# Patient Record
Sex: FEMALE | Race: WHITE | NOT HISPANIC OR LATINO | Employment: OTHER | URBAN - METROPOLITAN AREA
[De-identification: names, ages, dates, MRNs, and addresses within clinical notes are randomized per-mention and may not be internally consistent; named-entity substitution may affect disease eponyms.]

---

## 2017-01-23 DIAGNOSIS — I10 ESSENTIAL HYPERTENSION: Chronic | ICD-10-CM

## 2017-01-23 RX ORDER — AMLODIPINE BESYLATE 10 MG/1
TABLET ORAL
Qty: 90 TABLET | Refills: 0 | Status: CANCELLED | OUTPATIENT
Start: 2017-01-23

## 2017-01-31 ENCOUNTER — LAB VISIT (OUTPATIENT)
Dept: LAB | Facility: HOSPITAL | Age: 72
End: 2017-01-31
Attending: INTERNAL MEDICINE
Payer: MEDICARE

## 2017-01-31 ENCOUNTER — CLINICAL SUPPORT (OUTPATIENT)
Dept: FAMILY MEDICINE | Facility: CLINIC | Age: 72
End: 2017-01-31
Payer: MEDICARE

## 2017-01-31 DIAGNOSIS — E78.2 MIXED HYPERLIPIDEMIA: ICD-10-CM

## 2017-01-31 DIAGNOSIS — E78.5 HYPERLIPIDEMIA, UNSPECIFIED HYPERLIPIDEMIA TYPE: Primary | ICD-10-CM

## 2017-01-31 DIAGNOSIS — M54.12 CERVICAL RADICULOPATHY: ICD-10-CM

## 2017-01-31 DIAGNOSIS — I10 ESSENTIAL HYPERTENSION: Chronic | ICD-10-CM

## 2017-01-31 DIAGNOSIS — E53.8 B12 DEFICIENCY: ICD-10-CM

## 2017-01-31 LAB
ALBUMIN SERPL BCP-MCNC: 3.9 G/DL
ALP SERPL-CCNC: 110 U/L
ALT SERPL W/O P-5'-P-CCNC: 9 U/L
ANION GAP SERPL CALC-SCNC: 8 MMOL/L
AST SERPL-CCNC: 18 U/L
BILIRUB SERPL-MCNC: 0.4 MG/DL
BUN SERPL-MCNC: 27 MG/DL
CALCIUM SERPL-MCNC: 9.9 MG/DL
CHLORIDE SERPL-SCNC: 96 MMOL/L
CO2 SERPL-SCNC: 27 MMOL/L
CREAT SERPL-MCNC: 1.2 MG/DL
EST. GFR  (AFRICAN AMERICAN): 51.8 ML/MIN/1.73 M^2
EST. GFR  (NON AFRICAN AMERICAN): 44.9 ML/MIN/1.73 M^2
GLUCOSE SERPL-MCNC: 121 MG/DL
POTASSIUM SERPL-SCNC: 4.8 MMOL/L
PROT SERPL-MCNC: 7.7 G/DL
SODIUM SERPL-SCNC: 131 MMOL/L

## 2017-01-31 PROCEDURE — 96373 THER/PROPH/DIAG INJ IA: CPT | Mod: S$GLB,,, | Performed by: INTERNAL MEDICINE

## 2017-01-31 PROCEDURE — 96372 THER/PROPH/DIAG INJ SC/IM: CPT | Mod: S$GLB,,, | Performed by: INTERNAL MEDICINE

## 2017-01-31 PROCEDURE — 80053 COMPREHEN METABOLIC PANEL: CPT

## 2017-01-31 PROCEDURE — 36415 COLL VENOUS BLD VENIPUNCTURE: CPT | Mod: PO

## 2017-01-31 RX ORDER — AMLODIPINE BESYLATE 10 MG/1
10 TABLET ORAL DAILY
Qty: 90 TABLET | Refills: 0 | Status: SHIPPED | OUTPATIENT
Start: 2017-01-31 | End: 2017-05-28 | Stop reason: SDUPTHER

## 2017-01-31 RX ORDER — METOPROLOL SUCCINATE 50 MG/1
150 TABLET, EXTENDED RELEASE ORAL NIGHTLY
Qty: 270 TABLET | Refills: 0 | Status: SHIPPED | OUTPATIENT
Start: 2017-01-31 | End: 2017-09-07 | Stop reason: SDUPTHER

## 2017-01-31 RX ORDER — GABAPENTIN 100 MG/1
100 CAPSULE ORAL 2 TIMES DAILY
Qty: 180 CAPSULE | Refills: 0 | Status: SHIPPED | OUTPATIENT
Start: 2017-01-31 | End: 2017-02-01

## 2017-01-31 RX ORDER — ATORVASTATIN CALCIUM 40 MG/1
40 TABLET, FILM COATED ORAL DAILY
Qty: 90 TABLET | Refills: 0 | Status: SHIPPED | OUTPATIENT
Start: 2017-01-31 | End: 2017-02-01

## 2017-01-31 RX ORDER — LOSARTAN POTASSIUM AND HYDROCHLOROTHIAZIDE 25; 100 MG/1; MG/1
1 TABLET ORAL DAILY
Qty: 90 TABLET | Refills: 0 | Status: SHIPPED | OUTPATIENT
Start: 2017-01-31 | End: 2017-09-07 | Stop reason: SDUPTHER

## 2017-01-31 RX ADMIN — CYANOCOBALAMIN 100 MCG: 1000 INJECTION, SOLUTION INTRAMUSCULAR; SUBCUTANEOUS at 10:01

## 2017-01-31 NOTE — TELEPHONE ENCOUNTER
----- Message from Delaney Moonye sent at 1/30/2017  3:14 PM CST -----  Contact: SELF  [phone]  pT is requesting a refill on her meds, she will be calling at a later date to schedule a appt with you. Pls call pt . Thanks,,,,,,Sonali

## 2017-01-31 NOTE — PROGRESS NOTES
Patient tolerated injection well, also advised that she will need an new provider, Dr. Wolf is no longer here.

## 2017-02-01 ENCOUNTER — OFFICE VISIT (OUTPATIENT)
Dept: FAMILY MEDICINE | Facility: CLINIC | Age: 72
End: 2017-02-01
Payer: MEDICARE

## 2017-02-01 VITALS
SYSTOLIC BLOOD PRESSURE: 144 MMHG | HEART RATE: 63 BPM | BODY MASS INDEX: 17.17 KG/M2 | HEIGHT: 60 IN | RESPIRATION RATE: 16 BRPM | TEMPERATURE: 98 F | WEIGHT: 87.44 LBS | OXYGEN SATURATION: 99 % | DIASTOLIC BLOOD PRESSURE: 60 MMHG

## 2017-02-01 DIAGNOSIS — N17.9 AKI (ACUTE KIDNEY INJURY): ICD-10-CM

## 2017-02-01 DIAGNOSIS — I10 ESSENTIAL HYPERTENSION: Chronic | ICD-10-CM

## 2017-02-01 DIAGNOSIS — J44.9 CHRONIC OBSTRUCTIVE PULMONARY DISEASE, UNSPECIFIED COPD TYPE: Chronic | ICD-10-CM

## 2017-02-01 DIAGNOSIS — G47.00 INSOMNIA, UNSPECIFIED TYPE: ICD-10-CM

## 2017-02-01 DIAGNOSIS — I70.1 RENAL ARTERY STENOSIS: Chronic | ICD-10-CM

## 2017-02-01 DIAGNOSIS — Z00.00 ROUTINE MEDICAL EXAM: Primary | ICD-10-CM

## 2017-02-01 PROCEDURE — 99499 UNLISTED E&M SERVICE: CPT | Mod: S$GLB,,, | Performed by: INTERNAL MEDICINE

## 2017-02-01 PROCEDURE — 99397 PER PM REEVAL EST PAT 65+ YR: CPT | Mod: S$GLB,,, | Performed by: INTERNAL MEDICINE

## 2017-02-01 PROCEDURE — 3077F SYST BP >= 140 MM HG: CPT | Mod: S$GLB,,, | Performed by: INTERNAL MEDICINE

## 2017-02-01 PROCEDURE — 3078F DIAST BP <80 MM HG: CPT | Mod: S$GLB,,, | Performed by: INTERNAL MEDICINE

## 2017-02-01 PROCEDURE — 99999 PR PBB SHADOW E&M-EST. PATIENT-LVL III: CPT | Mod: PBBFAC,,, | Performed by: INTERNAL MEDICINE

## 2017-02-01 RX ORDER — ZOLPIDEM TARTRATE 5 MG/1
TABLET ORAL
Refills: 1 | COMMUNITY
Start: 2016-12-28 | End: 2017-03-20

## 2017-02-01 RX ORDER — ZOLPIDEM TARTRATE 5 MG/1
TABLET ORAL
Qty: 30 TABLET | Refills: 1 | Status: CANCELLED | OUTPATIENT
Start: 2017-02-01

## 2017-02-01 RX ORDER — DOXEPIN HYDROCHLORIDE 25 MG/1
25 CAPSULE ORAL NIGHTLY PRN
Qty: 90 CAPSULE | Refills: 0 | Status: SHIPPED | OUTPATIENT
Start: 2017-02-01 | End: 2017-03-20

## 2017-02-01 NOTE — PROGRESS NOTES
Chief Complaint  Chief Complaint   Patient presents with    [facility]    Medication Refill     ambien       MARIE  Ariadna Villegas is a 72 y.o. female with multiple medical diagnoses as listed in the medical history and problem list that presents for [facility].  Pt is new to me but is known to this clinic with her last appointment being 2017.  Patient was previously followed by one of my partners that is no longer at this practice.  She is also followed by Dr. Cazares for pain management.  She had been followed by Dr. Rose for COPD and insomnia.  She is using her Ambien roughly 3x a week.  She reports that she mostly has trouble staying asleep as opposed to falling asleep.  Not staying asleep even with the ambien.  Was previously on other medications but cannot state which one.  Denies ever having side effects to the meds.  Quick chart review shows that she was on low dose mirtazapine at one point.   She was seen by the sleep medicine clinic but does not want to go back.        PAST MEDICAL HISTORY:  Past Medical History   Diagnosis Date    Arthritis     Malignant hypertension     Osteoporosis, postmenopausal        PAST SURGICAL HISTORY:  Past Surgical History   Procedure Laterality Date     x3      Partial hysterectomy       , after her third     Salpingoophorectomy       , for a ovarian cyst       SOCIAL HISTORY:  Social History     Social History    Marital status:      Spouse name: N/A    Number of children: N/A    Years of education: N/A     Occupational History    Retired      Social History Main Topics    Smoking status: Current Every Day Smoker     Packs/day: 0.50     Years: 60.00     Types: Cigarettes    Smokeless tobacco: Never Used    Alcohol use Yes      Comment: socially    Drug use: No    Sexual activity: No      Comment:      Other Topics Concern    Not on file     Social History Narrative       FAMILY HISTORY:  Family History    Problem Relation Age of Onset    Heart disease Mother     Diabetes Mother     Stroke Father     Heart disease Sister        ALLERGIES AND MEDICATIONS: updated and reviewed.  Review of patient's allergies indicates:   Allergen Reactions    Lunesta [eszopiclone] Other (See Comments)     Sleep walking     Current Outpatient Prescriptions   Medication Sig Dispense Refill    amlodipine (NORVASC) 10 MG tablet Take 1 tablet (10 mg total) by mouth once daily. 90 tablet 0    aspirin (ECOTRIN) 81 MG EC tablet Take 1 tablet (81 mg total) by mouth once daily.      losartan-hydrochlorothiazide 100-25 mg (HYZAAR) 100-25 mg per tablet Take 1 tablet by mouth once daily. 90 tablet 0    metoprolol succinate (TOPROL-XL) 50 MG 24 hr tablet Take 3 tablets (150 mg total) by mouth every evening. 270 tablet 0    oxycodone (ROXICODONE) 5 MG immediate release tablet Take 5 mg by mouth every 6 (six) hours as needed.   0    zolpidem (AMBIEN) 5 MG Tab TK 1 T PO QHS PRN  1    doxepin (SINEQUAN) 25 MG capsule Take 1 capsule (25 mg total) by mouth nightly as needed (insomnia). 90 capsule 0     Current Facility-Administered Medications   Medication Dose Route Frequency Provider Last Rate Last Dose    cyanocobalamin injection 100 mcg  100 mcg Intramuscular Q30 Days WDolly Wolf MD   100 mcg at 01/31/17 1020         ROS  Review of Systems   Constitutional: Negative for chills, fever and unexpected weight change.   HENT: Negative for congestion, ear pain, hearing loss, rhinorrhea, sore throat and trouble swallowing.    Eyes: Negative for discharge, redness and visual disturbance.   Respiratory: Negative for cough, chest tightness, shortness of breath and wheezing.    Cardiovascular: Negative for chest pain, palpitations and leg swelling.   Gastrointestinal: Negative for abdominal pain, constipation, diarrhea, nausea and vomiting.   Genitourinary: Negative for decreased urine volume, dysuria and hematuria.   Musculoskeletal:  Negative for arthralgias, joint swelling and myalgias.   Skin: Negative for color change and rash.   Neurological: Negative for dizziness, weakness, light-headedness and headaches.   Psychiatric/Behavioral: Positive for sleep disturbance. Negative for decreased concentration, dysphoric mood and suicidal ideas.         Physical Exam  Vitals:    02/01/17 1257   BP: (!) 144/60   BP Location: Left arm   Patient Position: Sitting   BP Method: Manual   Pulse: 63   Resp: 16   Temp: 98.2 °F (36.8 °C)   TempSrc: Oral   SpO2: 99%   Weight: 39.7 kg (87 lb 6.6 oz)   Height: 5' (1.524 m)    Body mass index is 17.07 kg/(m^2).  Weight: 39.7 kg (87 lb 6.6 oz)   Height: 5' (152.4 cm)   General appearance: alert, appears stated age, cooperative, no distress and thin  Head: Normocephalic, without obvious abnormality, atraumatic  Eyes: PERRL EOMI conjunctiva clear  Ears: normal TM's and external ear canals both ears  Nose: Nares normal. Septum midline. Mucosa normal. No drainage or sinus tenderness.  Throat: lips, mucosa, and tongue normal; teeth and gums normal  Neck: no adenopathy, no carotid bruit, no JVD, supple, symmetrical, trachea midline and thyroid not enlarged, symmetric, no tenderness/mass/nodules  Back: symmetric, no curvature. ROM normal. No CVA tenderness.  Lungs: clear to auscultation bilaterally  Heart: regular rate and rhythm, S1, S2 normal, no murmur, click, rub or gallop  Abdomen: soft, non-tender; bowel sounds normal; no masses,  no organomegaly  Extremities: extremities normal, atraumatic, no cyanosis or edema  Pulses: 2+ and symmetric  Neurologic: Grossly normal      Health Maintenance       Date Due Completion Date    Mammogram 2/20/2017 (Originally 3/25/2016) 3/25/2014 (Declined)    Override on 3/25/2014: Declined (not at this present time)    Override on 5/14/2012: Done    Lipid Panel 2/2/2017 2/2/2016    DEXA SCAN 3/25/2017 3/25/2014 (Declined)    Override on 3/25/2014: Declined (not at this present time)     "Override on 6/12/2012: Done    Colonoscopy 1/1/2018 1/1/2008 (Done)    Override on 1/1/2008: Done    Override on 5/14/2006: Done    TETANUS VACCINE 8/24/2026 8/24/2016 (Declined)    Override on 8/24/2016: Declined            Assessment & Plan  Routine medical exam - Discussed healthy diet, regular exercise, necessary labs, age appropriate cancer screening, and routine vaccinations.  Pt declined her lipid panel today stating,"cardiology is managing that."    Chronic obstructive pulmonary disease, unspecified COPD type - not on any meds.  Stable.  Monitor    FERNANDO (acute kidney injury) - recheck in a week.  Will check urine studies as well for FeUrea  -     Basic metabolic panel; Future; Expected date: 2/1/17  -     Urea nitrogen, urine; Future  -     Creatinine, urine, random; Future    Insomnia, unspecified type - stop ambien as this wasn't helping much.  Will start trial of doxepin.   -     doxepin (SINEQUAN) 25 MG capsule; Take 1 capsule (25 mg total) by mouth nightly as needed (insomnia).  Dispense: 90 capsule; Refill: 0    Essential hypertension - The current medical regimen is effective;  continue present plan and medications.    Renal artery stenosis - managed by cardiology.  Monitor         Health Maintenance reviewed, refusing most of her health maintenance.    Follow-up: Return in about 6 months (around 8/1/2017) for follow up for chronic conditions.  "

## 2017-02-01 NOTE — MR AVS SNAPSHOT
[facility]  [address]  [place] [address]  Phone: [phone]  Fax: [phone]                  Ariadna Villegas   2017 1:00 PM   Office Visit    Description:  Female : 1945   Provider:  Ede Emerson MD   Department:  [facility] Medicine           Reason for Visit     Establish Care     Medication Refill           Diagnoses this Visit        Comments    Routine medical exam    -  Primary     Chronic obstructive pulmonary disease, unspecified COPD type         FERNANDO (acute kidney injury)         Insomnia, unspecified type         Essential hypertension         Renal artery stenosis                To Do List           Future Appointments        Provider Department Dept Phone    2017 8:00 AM LAB, [place] [facility] [phone]    2017 8:15 AM SPECIMEN, [place] [facility] [phone]    3/20/2017 1:00 PM Jair Smiley MD [facility] Cardiology [phone]      Goals (5 Years of Data)              Today    16    Blood Pressure < 130/80   144/60  139/65  188/74    Quit smoking / using tobacco             Follow-Up and Disposition     Return in about 6 months (around 2017) for follow up for chronic conditions.    Follow-up and Disposition History       These Medications        Disp Refills Start End    doxepin (SINEQUAN) 25 MG capsule 90 capsule 0 2017    Take 1 capsule (25 mg total) by mouth nightly as needed (insomnia). - Oral    Pharmacy: [facility] [address] [place] [address] [place] [address] AT [facility] Ph #: [phone]         [facility] On Call     [facility] On Call Nurse Care Line -  Assistance  Registered nurses in the [facility] On Call Center provide clinical advisement, health education, appointment booking, and other advisory services.  Call for this free service at [phone].             Medications           Message regarding  Medications     Verify the changes and/or additions to your medication regime listed below are the same as discussed with your clinician today.  If any of these changes or additions are incorrect, please notify your healthcare provider.        START taking these NEW medications        Refills    doxepin (SINEQUAN) 25 MG capsule 0    Sig: Take 1 capsule (25 mg total) by mouth nightly as needed (insomnia).    Class: Normal    Route: Oral      STOP taking these medications     atorvastatin (LIPITOR) 40 MG tablet Take 1 tablet (40 mg total) by mouth once daily.    gabapentin (NEURONTIN) 100 MG capsule Take 1 capsule (100 mg total) by mouth 2 (two) times daily.           Verify that the below list of medications is an accurate representation of the medications you are currently taking.  If none reported, the list may be blank. If incorrect, please contact your healthcare provider. Carry this list with you in case of emergency.           Current Medications     amlodipine (NORVASC) 10 MG tablet Take 1 tablet (10 mg total) by mouth once daily.    aspirin (ECOTRIN) 81 MG EC tablet Take 1 tablet (81 mg total) by mouth once daily.    losartan-hydrochlorothiazide 100-25 mg (HYZAAR) 100-25 mg per tablet Take 1 tablet by mouth once daily.    metoprolol succinate (TOPROL-XL) 50 MG 24 hr tablet Take 3 tablets (150 mg total) by mouth every evening.    oxycodone (ROXICODONE) 5 MG immediate release tablet Take 5 mg by mouth every 6 (six) hours as needed.     zolpidem (AMBIEN) 5 MG Tab TK 1 T PO QHS PRN    doxepin (SINEQUAN) 25 MG capsule Take 1 capsule (25 mg total) by mouth nightly as needed (insomnia).           Clinical Reference Information           Vital Signs - Last Recorded  Most recent update: 2/1/2017 12:58 PM by Miriam Shabazz MA    BP Pulse Temp Resp Ht Wt    (!) 144/60 (BP Location: Left arm, Patient Position: Sitting, BP Method: Manual) 63 98.2 °F (36.8 °C) (Oral) 16 5' (1.524 m) 39.7 kg (87 lb 6.6 oz)    SpO2 BMI              99% 17.07 kg/m2         Blood Pressure          Most Recent Value    BP  (!)  144/60      Allergies as of 2/1/2017     Lunesta [Eszopiclone]      Immunizations Administered on Date of Encounter - 2/1/2017     None      Orders Placed During Today's Visit     Future Labs/Procedures Expected by Expires    Basic metabolic panel  2/1/2017 2/1/2018    Creatinine, urine, random  As directed 11/28/2017    Urea nitrogen, urine  As directed 11/28/2017      Instructions    We are stopping the ambien since it wasn't working.         Smoking Cessation     If you would like to quit smoking:   You may be eligible for free services if you are a [place] resident and started smoking cigarettes before September 1, 1988.  Call the Smoking Cessation Trust ([employer]) toll free at (943) 416-8770 or (634) 555-5268.   Call 4-728-QUIT-NOW if you do not meet the above criteria.

## 2017-02-02 ENCOUNTER — TELEPHONE (OUTPATIENT)
Dept: CARDIOLOGY | Facility: CLINIC | Age: 72
End: 2017-02-02

## 2017-02-02 NOTE — TELEPHONE ENCOUNTER
----- Message from Delaney Mooney sent at 2/2/2017 12:17 PM CST -----  Contact: self  [phone]  Pt is requesting to speak to you regarding her labs that's he had done, Dr. Emerson is requesting her to have labs done again next week, but she wants to talk to you before she has them done. Pls call pt [phone]. Thanks.....NURY

## 2017-02-06 ENCOUNTER — TELEPHONE (OUTPATIENT)
Dept: CARDIOLOGY | Facility: CLINIC | Age: 72
End: 2017-02-06

## 2017-02-06 NOTE — TELEPHONE ENCOUNTER
----- Message from Tiffani Sahu sent at 2/6/2017 11:32 AM CST -----  Contact: self  Pt calling to discuss lab results. Please call [phone].

## 2017-02-08 ENCOUNTER — LAB VISIT (OUTPATIENT)
Dept: LAB | Facility: HOSPITAL | Age: 72
End: 2017-02-08
Attending: INTERNAL MEDICINE
Payer: MEDICARE

## 2017-02-08 DIAGNOSIS — N17.9 AKI (ACUTE KIDNEY INJURY): ICD-10-CM

## 2017-02-08 LAB
CREAT UR-MCNC: 67 MG/DL
UUN UR-MCNC: 732 MG/DL

## 2017-02-08 PROCEDURE — 82570 ASSAY OF URINE CREATININE: CPT

## 2017-02-08 PROCEDURE — 84540 ASSAY OF URINE/UREA-N: CPT

## 2017-02-10 ENCOUNTER — TELEPHONE (OUTPATIENT)
Dept: FAMILY MEDICINE | Facility: CLINIC | Age: 72
End: 2017-02-10

## 2017-02-10 NOTE — TELEPHONE ENCOUNTER
Please inform the patient that her kidney function has improved.  Please continue to be sure that she is drinking enough water each day.  No changes in medication.     Thank you,  Bran

## 2017-03-20 ENCOUNTER — OFFICE VISIT (OUTPATIENT)
Dept: CARDIOLOGY | Facility: CLINIC | Age: 72
End: 2017-03-20
Payer: MEDICARE

## 2017-03-20 VITALS
OXYGEN SATURATION: 97 % | BODY MASS INDEX: 17.31 KG/M2 | WEIGHT: 88.19 LBS | HEIGHT: 60 IN | SYSTOLIC BLOOD PRESSURE: 118 MMHG | DIASTOLIC BLOOD PRESSURE: 62 MMHG | HEART RATE: 60 BPM

## 2017-03-20 DIAGNOSIS — R06.02 SHORTNESS OF BREATH: Primary | ICD-10-CM

## 2017-03-20 DIAGNOSIS — I10 ESSENTIAL HYPERTENSION: ICD-10-CM

## 2017-03-20 DIAGNOSIS — I70.1 RENAL ARTERY STENOSIS: ICD-10-CM

## 2017-03-20 DIAGNOSIS — I73.9 PVD (PERIPHERAL VASCULAR DISEASE): ICD-10-CM

## 2017-03-20 DIAGNOSIS — E78.2 MIXED HYPERLIPIDEMIA: ICD-10-CM

## 2017-03-20 DIAGNOSIS — J43.8 OTHER EMPHYSEMA: ICD-10-CM

## 2017-03-20 DIAGNOSIS — Z72.0 TOBACCO ABUSE: ICD-10-CM

## 2017-03-20 DIAGNOSIS — R42 DIZZINESS: ICD-10-CM

## 2017-03-20 PROCEDURE — 96372 THER/PROPH/DIAG INJ SC/IM: CPT | Mod: S$GLB,,, | Performed by: INTERNAL MEDICINE

## 2017-03-20 PROCEDURE — 3078F DIAST BP <80 MM HG: CPT | Mod: S$GLB,,, | Performed by: INTERNAL MEDICINE

## 2017-03-20 PROCEDURE — 1126F AMNT PAIN NOTED NONE PRSNT: CPT | Mod: S$GLB,,, | Performed by: INTERNAL MEDICINE

## 2017-03-20 PROCEDURE — 99214 OFFICE O/P EST MOD 30 MIN: CPT | Mod: 25,S$GLB,, | Performed by: INTERNAL MEDICINE

## 2017-03-20 PROCEDURE — 3074F SYST BP LT 130 MM HG: CPT | Mod: S$GLB,,, | Performed by: INTERNAL MEDICINE

## 2017-03-20 PROCEDURE — 99499 UNLISTED E&M SERVICE: CPT | Mod: S$GLB,,, | Performed by: INTERNAL MEDICINE

## 2017-03-20 PROCEDURE — 1160F RVW MEDS BY RX/DR IN RCRD: CPT | Mod: S$GLB,,, | Performed by: INTERNAL MEDICINE

## 2017-03-20 PROCEDURE — 1157F ADVNC CARE PLAN IN RCRD: CPT | Mod: S$GLB,,, | Performed by: INTERNAL MEDICINE

## 2017-03-20 PROCEDURE — 99999 PR PBB SHADOW E&M-EST. PATIENT-LVL III: CPT | Mod: PBBFAC,,, | Performed by: INTERNAL MEDICINE

## 2017-03-20 PROCEDURE — 1159F MED LIST DOCD IN RCRD: CPT | Mod: S$GLB,,, | Performed by: INTERNAL MEDICINE

## 2017-03-20 RX ADMIN — CYANOCOBALAMIN 100 MCG: 1000 INJECTION, SOLUTION INTRAMUSCULAR; SUBCUTANEOUS at 01:03

## 2017-03-20 NOTE — PROGRESS NOTES
Subjective:    Patient ID:  Ariadna Villegas is a 72 y.o. female who presents for follow-up of Follow-up      HPI   previous history:  Here for follow-up of chest pain and hypertension.  She denies any worsening cardiopulmonary complaints.  She's not a chest pain, shortness breath or palpitations.  She does get a little dizziness but not to the point of presyncope or syncope.  This is usually when standing up.  We discussed orthostatics and her daughter-in-law is a nurse here in clinic with her today.  She denies any PND, orthopnea or lower edema.  She still smoking a few cigarettes per day.  Here to follow-up diagnostic testing as below which was essentially within normal limits.    Today:  Here for follow-up of chest pain and hypertension.  She denies any worsening cardiopulmonary complaints.  She's not expressing chest pain, shortness breath or palpitations.  She denies any PND, orthopnea or lower edema.  She's not expressing dizziness, presyncope or syncope.  She still smoking a few cigarettes per day.  Otherwise she's able to do all her daily activities without any restrictions.  She mainly is complaining of difficulty sleeping at night.  We discussed several things in terms of sleep hygiene.      Review of Systems   Constitution: Negative.   HENT: Negative.    Eyes: Negative.    Cardiovascular: Negative for chest pain, dyspnea on exertion, irregular heartbeat, leg swelling, near-syncope, orthopnea, palpitations, paroxysmal nocturnal dyspnea and syncope.   Respiratory: Positive for sleep disturbances due to breathing. Negative for shortness of breath.    Skin: Negative.    Musculoskeletal: Negative.    Gastrointestinal: Negative for abdominal pain, constipation and diarrhea.   Genitourinary: Negative for dysuria.   Neurological: Negative.    Psychiatric/Behavioral: The patient has insomnia.         Objective:    Physical Exam   Constitutional: She is oriented to person, place, and time. She appears  well-developed and well-nourished.   HENT:   Head: Normocephalic and atraumatic.   Eyes: Conjunctivae and EOM are normal. Pupils are equal, round, and reactive to light.   Neck: Normal range of motion. Neck supple. No thyromegaly present.   Cardiovascular: Normal rate and regular rhythm.    No murmur heard.  Pulmonary/Chest: Effort normal and breath sounds normal. No respiratory distress.   Abdominal: Soft. Bowel sounds are normal.   Musculoskeletal: She exhibits no edema.   Neurological: She is alert and oriented to person, place, and time.   Skin: Skin is warm and dry.   Psychiatric: She has a normal mood and affect. Her behavior is normal.       echocardiogram:  CONCLUSIONS     1 - Normal left ventricular systolic function (EF 60-65%).     2 - Concentric remodeling.     3 - Left ventricular diastolic dysfunction.     4 - The estimated PA systolic pressure is 35 mmHg.     5 - Trivial mitral regurgitation.     6 - Trivial to mild tricuspid regurgitation.      NST:  Impression: NORMAL MYOCARDIAL PERFUSION  1. The perfusion scan is free of evidence for myocardial ischemia or injury.   2. Resting wall motion is physiologic.   3. Resting LV function is normal.   4. The ventricular volumes are normal at rest and stress.   5. The extracardiac distribution of radioactivity is normal.   6. When compared to the previous study from 06/10/2015, no change    Renal artery ultrasound:   Elevated velocities in the left proximal renal artery, suggesting significant/ >50% stenosis.  To a lesser degree, elevated velocities noted in the right renal artery, as well.    LDL-120    Assessment:       1. Shortness of breath    2. Essential hypertension    3. Renal artery stenosis    4. Dizziness    5. PVD (peripheral vascular disease)    6. Mixed hyperlipidemia    7. Tobacco abuse    8. Other emphysema         Plan:       -Continue follow symptoms mainly, likely blockage and if any significant progression consider  catheterization  -Continue medical therapy  -SBP stable, no indication for renal artery intervention  -Counseled on tobacco cessation, refer to smoking clinic  -Stable on statin therapy (*prefer goal <100)   -Check labs at next visit       RTC 6 mo

## 2017-03-20 NOTE — MR AVS SNAPSHOT
[facility]  [address]  [place] [address]  Phone: [phone]                  Ariadna Villegas   3/20/2017 1:00 PM   Office Visit    Description:  Female : 1945   Provider:  Jair Smiley MD   Department:  [facility]           Reason for Visit     Follow-up                To Do List           Goals (5 Years of Data)              Today    17    Blood Pressure < 130/80   118/62  118/62  118/62    Quit smoking / using tobacco             [facility] Care [facility] -  Assistance  Registered nurses in the [facility] provide clinical advisement, health education, appointment booking, and other advisory services.  Call for this free service at [phone].             Medications           Message regarding Medications     Verify the changes and/or additions to your medication regime listed below are the same as discussed with your clinician today.  If any of these changes or additions are incorrect, please notify your healthcare provider.        STOP taking these medications     doxepin (SINEQUAN) 25 MG capsule Take 1 capsule (25 mg total) by mouth nightly as needed (insomnia).    zolpidem (AMBIEN) 5 MG Tab TK 1 T PO QHS PRN           Verify that the below list of medications is an accurate representation of the medications you are currently taking.  If none reported, the list may be blank. If incorrect, please contact your healthcare provider. Carry this list with you in case of emergency.           Current Medications     amlodipine (NORVASC) 10 MG tablet Take 1 tablet (10 mg total) by mouth once daily.    aspirin (ECOTRIN) 81 MG EC tablet Take 1 tablet (81 mg total) by mouth once daily.    losartan-hydrochlorothiazide 100-25 mg (HYZAAR) 100-25 mg per tablet Take 1 tablet by mouth once daily.    metoprolol succinate (TOPROL-XL) 50 MG 24 hr tablet Take 3 tablets (150 mg total) by mouth every evening.    oxycodone  (ROXICODONE) 5 MG immediate release tablet Take 5 mg by mouth every 6 (six) hours as needed.            Clinical Reference Information           Your Vitals Were     BP Pulse Height Weight SpO2 BMI    118/62 (BP Location: Left arm, Patient Position: Sitting, BP Method: Manual) 60 5' (1.524 m) 40 kg (88 lb 3.2 oz) 97% 17.23 kg/m2      Blood Pressure          Most Recent Value    BP  118/62      Allergies as of 3/20/2017     Lunesta [Eszopiclone]      Immunizations Administered on Date of Encounter - 3/20/2017     None      [facility] Sign-Up     Activating your [facility] account is as easy as 1-2-3!     1) Visit [email or web address], select Sign Up Now, enter this activation code and your date of birth, then select Next.  Activation code not generated  Current Patient Portal Status: Account disabled      2) Create a username and password to use when you visit [facility] in the future and select a security question in case you lose your password and select Next.    3) Enter your e-mail address and click Sign Up!    Additional Information  If you have questions, please e-mail [email or web address] or call [phone] to talk to our [facility] staff. Remember, [facility] is NOT to be used for urgent needs. For medical emergencies, dial 911.         Smoking Cessation     If you would like to quit smoking:   You may be eligible for free services if you are a [place] resident and started smoking cigarettes before September 1, 1988.  Call the Smoking Cessation Trust ([facility]) toll free at (813) 747-6264 or (689) 089-5462.   Call 1-800-QUIT-NOW if you do not meet the above criteria.            Language Assistance Services     ATTENTION: Language assistance services are available, free of charge. Please call [phone].      ATENCIÓN: Si habla español, tiene a arerola disposición servicios gratuitos de asistencia lingüística. Llame al [phone].     CHÚ Ý: N?u b?n nói Ti?ng Vi?t, có các d?ch v? h? tr? ngôn ng? mi?n phí dành cho  b?n. G?i s? [phone].         [facility] complies with applicable Federal civil rights laws and does not discriminate on the basis of race, color, national origin, age, disability, or sex.

## 2017-03-28 ENCOUNTER — OFFICE VISIT (OUTPATIENT)
Dept: FAMILY MEDICINE | Facility: CLINIC | Age: 72
End: 2017-03-28
Payer: MEDICARE

## 2017-03-28 VITALS
DIASTOLIC BLOOD PRESSURE: 82 MMHG | RESPIRATION RATE: 20 BRPM | OXYGEN SATURATION: 99 % | HEART RATE: 120 BPM | SYSTOLIC BLOOD PRESSURE: 126 MMHG | WEIGHT: 86.44 LBS | BODY MASS INDEX: 14.4 KG/M2 | HEIGHT: 65 IN | TEMPERATURE: 98 F

## 2017-03-28 DIAGNOSIS — J44.9 CHRONIC OBSTRUCTIVE PULMONARY DISEASE, UNSPECIFIED COPD TYPE: Chronic | ICD-10-CM

## 2017-03-28 DIAGNOSIS — I10 ESSENTIAL HYPERTENSION: Primary | Chronic | ICD-10-CM

## 2017-03-28 DIAGNOSIS — G47.00 INSOMNIA, UNSPECIFIED TYPE: ICD-10-CM

## 2017-03-28 DIAGNOSIS — N18.30 CKD (CHRONIC KIDNEY DISEASE) STAGE 3, GFR 30-59 ML/MIN: ICD-10-CM

## 2017-03-28 DIAGNOSIS — F13.20 SEDATIVE HYPNOTIC OR ANXIOLYTIC DEPENDENCE: Chronic | ICD-10-CM

## 2017-03-28 PROCEDURE — 99214 OFFICE O/P EST MOD 30 MIN: CPT | Mod: S$GLB,,, | Performed by: FAMILY MEDICINE

## 2017-03-28 PROCEDURE — 1160F RVW MEDS BY RX/DR IN RCRD: CPT | Mod: S$GLB,,, | Performed by: FAMILY MEDICINE

## 2017-03-28 PROCEDURE — 1126F AMNT PAIN NOTED NONE PRSNT: CPT | Mod: S$GLB,,, | Performed by: FAMILY MEDICINE

## 2017-03-28 PROCEDURE — 99999 PR PBB SHADOW E&M-EST. PATIENT-LVL IV: CPT | Mod: PBBFAC,,, | Performed by: FAMILY MEDICINE

## 2017-03-28 PROCEDURE — 3074F SYST BP LT 130 MM HG: CPT | Mod: S$GLB,,, | Performed by: FAMILY MEDICINE

## 2017-03-28 PROCEDURE — 3079F DIAST BP 80-89 MM HG: CPT | Mod: S$GLB,,, | Performed by: FAMILY MEDICINE

## 2017-03-28 PROCEDURE — 99499 UNLISTED E&M SERVICE: CPT | Mod: S$GLB,,, | Performed by: FAMILY MEDICINE

## 2017-03-28 PROCEDURE — 1157F ADVNC CARE PLAN IN RCRD: CPT | Mod: S$GLB,,, | Performed by: FAMILY MEDICINE

## 2017-03-28 PROCEDURE — 1159F MED LIST DOCD IN RCRD: CPT | Mod: S$GLB,,, | Performed by: FAMILY MEDICINE

## 2017-03-28 RX ORDER — RAMELTEON 8 MG/1
8 TABLET ORAL NIGHTLY
Qty: 90 TABLET | Refills: 0 | Status: SHIPPED | OUTPATIENT
Start: 2017-03-28 | End: 2017-04-27

## 2017-03-28 NOTE — PROGRESS NOTES
Chief Complaint   Patient presents with    Establish Care    Medication Management       HPI  Ariadna Villegas is a 72 y.o. female with multiple medical diagnoses as listed in the medical history and problem list that presents for establishment of care and to discuss medications for her insomnia.    Her blood pressure is managed by Dr. Smiley and she had a recent visit with him two weeks prior. She has also seen Dr. Emerson and was given doxepin for her insomnia but it has not helped. She gets 3 hours of sleep a night and some nights she lays awake. She does not sleep in the daytime. She has taken ambien and lunesta and ambien she gets 3 hours but on lunesta she sleep walks so this was discontinued. She does see Dr. Cazares  For pain management for arthritis in her neck and back. She took a friend's ativan and she did sleep very well and did not wake.     PAST MEDICAL HISTORY:  Past Medical History:   Diagnosis Date    Arthritis     neck and back    COPD (chronic obstructive pulmonary disease)     Malignant hypertension     Osteoporosis, postmenopausal        PAST SURGICAL HISTORY:  Past Surgical History:   Procedure Laterality Date     x3      PARTIAL HYSTERECTOMY      , after her third     SALPINGOOPHORECTOMY      , for a ovarian cyst       SOCIAL HISTORY:  Social History     Social History    Marital status:      Spouse name: N/A    Number of children: N/A    Years of education: N/A     Occupational History    Retired      Social History Main Topics    Smoking status: Current Every Day Smoker     Packs/day: 0.50     Years: 60.00     Types: Cigarettes    Smokeless tobacco: Never Used    Alcohol use Yes      Comment: socially    Drug use: No    Sexual activity: No      Comment:      Other Topics Concern    Not on file     Social History Narrative       FAMILY HISTORY:  Family History   Problem Relation Age of Onset    Heart disease Mother     Diabetes Mother      Stroke Father     Heart disease Sister        ALLERGIES AND MEDICATIONS: updated and reviewed.  Review of patient's allergies indicates:   Allergen Reactions    Lunesta [eszopiclone] Other (See Comments)     Sleep walking     Current Outpatient Prescriptions   Medication Sig Dispense Refill    amlodipine (NORVASC) 10 MG tablet Take 1 tablet (10 mg total) by mouth once daily. 90 tablet 0    aspirin (ECOTRIN) 81 MG EC tablet Take 1 tablet (81 mg total) by mouth once daily.      losartan-hydrochlorothiazide 100-25 mg (HYZAAR) 100-25 mg per tablet Take 1 tablet by mouth once daily. 90 tablet 0    metoprolol succinate (TOPROL-XL) 50 MG 24 hr tablet Take 3 tablets (150 mg total) by mouth every evening. 270 tablet 0    oxycodone (ROXICODONE) 5 MG immediate release tablet Take 5 mg by mouth every 6 (six) hours as needed.   0    ramelteon (ROZEREM) 8 mg tablet Take 1 tablet (8 mg total) by mouth every evening. 90 tablet 0     Current Facility-Administered Medications   Medication Dose Route Frequency Provider Last Rate Last Dose    cyanocobalamin injection 100 mcg  100 mcg Intramuscular Q30 Days MENDEL Wolf MD   100 mcg at 03/20/17 1337       ROS  Review of Systems   Constitutional: Negative for chills, diaphoresis, fatigue, fever and unexpected weight change.   HENT: Negative for rhinorrhea, sinus pressure, sore throat and tinnitus.    Eyes: Negative for photophobia and visual disturbance.   Respiratory: Negative for cough, shortness of breath and wheezing.    Cardiovascular: Negative for chest pain and palpitations.   Gastrointestinal: Negative for abdominal pain, blood in stool, constipation, diarrhea, nausea and vomiting.   Genitourinary: Negative for dysuria, flank pain, frequency and vaginal discharge.   Musculoskeletal: Negative for arthralgias and joint swelling.   Skin: Negative for rash.   Neurological: Negative for speech difficulty, weakness, light-headedness and headaches.  "  Psychiatric/Behavioral: Positive for sleep disturbance. Negative for behavioral problems and dysphoric mood.       Physical Exam  Vitals:    03/28/17 1313 03/28/17 1354   BP: (!) 160/80 126/82   Pulse: (!) 120    Resp: 20    Temp: 98.4 °F (36.9 °C)    TempSrc: Oral    SpO2: 99%    Weight: 39.2 kg (86 lb 6.7 oz)    Height: 5' 5" (1.651 m)     Body mass index is 14.38 kg/(m^2).  Weight: 39.2 kg (86 lb 6.7 oz)   Height: 5' 5" (165.1 cm)     Physical Exam   Constitutional: She is oriented to person, place, and time. She appears well-developed and well-nourished. No distress.   HENT:   Head: Normocephalic and atraumatic.   Eyes: EOM are normal.   Neck: Neck supple.   Cardiovascular: Normal rate and regular rhythm.  Exam reveals no gallop and no friction rub.    No murmur heard.  Pulmonary/Chest: Effort normal and breath sounds normal. No respiratory distress. She has no wheezes. She has no rales.   Abdominal: There is no rebound.   Lymphadenopathy:     She has no cervical adenopathy.   Neurological: She is alert and oriented to person, place, and time.   Skin: Skin is warm and dry. No rash noted.   Psychiatric: She has a normal mood and affect. Her behavior is normal.   Nursing note and vitals reviewed.      Health Maintenance       Date Due Completion Date    Lipid Panel 2/2/2017 2/2/2016    DEXA SCAN 3/25/2017 3/25/2014 (Declined)    Override on 3/25/2014: Declined (not at this present time)    Override on 6/12/2012: Done    Colonoscopy 1/1/2018 1/1/2008 (Done)    Override on 1/1/2008: Done    Override on 5/14/2006: Done    Mammogram 3/28/2019 3/28/2017 (Declined)    Override on 3/28/2017: Declined    Override on 3/25/2014: Declined (not at this present time)    Override on 5/14/2012: Done    TETANUS VACCINE 8/24/2026 8/24/2016 (Declined)    Override on 8/24/2016: Declined            ASSESSMENT     1. Essential hypertension    2. Sedative hypnotic or anxiolytic dependence    3. Chronic obstructive pulmonary disease, " unspecified COPD type    4. Insomnia, unspecified type    5. CKD (chronic kidney disease) stage 3, GFR 30-59 ml/min        PLAN:     Essential hypertension  -recheck improved, Bp controlled    Sedative hypnotic or anxiolytic dependence  -     ramelteon (ROZEREM) 8 mg tablet; Take 1 tablet (8 mg total) by mouth every evening.  Dispense: 90 tablet; Refill: 0    Chronic obstructive pulmonary disease, unspecified COPD type  This is a chronic medical condition that is stable under the current regimen. Continue to monitor and we will make medication adjustments as needed.       Insomnia, unspecified type  -discussed rozerem side effects of interacting with her roxicodone, she should not take these together as they can cause respiratory depression  -     ramelteon (ROZEREM) 8 mg tablet; Take 1 tablet (8 mg total) by mouth every evening.  Dispense: 90 tablet; Refill: 0    CKD (chronic kidney disease) stage 3, GFR 30-59 ml/min  -This is a chronic medical condition that is stable under the current regimen. Continue to monitor and we will make medication adjustments as needed.               Kirti Morris MD  03/28/2017 1:54 PM        Return in about 1 month (around 4/28/2017) for Follow up.

## 2017-03-28 NOTE — MR AVS SNAPSHOT
[facility]  [address]  [place] [address]  Phone: [phone]  Fax: [phone]                  Ariadna Villegas   3/28/2017 2:00 PM   Office Visit    Description:  Female : 1945   Provider:  Kirti Morris MD   Department:  [facility] - [facility]           Reason for Visit     Establish Care     Medication Management           Diagnoses this Visit        Comments    Essential hypertension    -  Primary     Sedative hypnotic or anxiolytic dependence         Chronic obstructive pulmonary disease, unspecified COPD type         Insomnia, unspecified type         CKD (chronic kidney disease) stage 3, GFR 30-59 ml/min                To Do List           Future Appointments        Provider Department Dept Phone    3/28/2017 2:00 PM Kirti Morris MD [facility] [phone]      Goals (5 Years of Data)              Today    3/20/17    2/1/17    Blood Pressure < 130/80   160/80  160/80  160/80    Quit smoking / using tobacco             Follow-Up and Disposition     Return in about 1 month (around 2017) for Follow up.       These Medications        Disp Refills Start End    ramelteon (ROZEREM) 8 mg tablet 90 tablet 0 3/28/2017     Take 1 tablet (8 mg total) by mouth every evening. - Oral    Pharmacy: [facility] [address] [place] [address] AT [facility] #: [phone]         [facility] On [facility]     [facility] On Call Nurse Care Line -  Assistance  Registered nurses in the [facility] On Call Center provide clinical advisement, health education, appointment booking, and other advisory services.  Call for this free service at [phone].             Medications           Message regarding Medications     Verify the changes and/or additions to your medication regime listed below are the same as discussed with your clinician today.  If any of these changes or additions are incorrect, please notify your healthcare  "provider.        START taking these NEW medications        Refills    ramelteon (ROZEREM) 8 mg tablet 0    Sig: Take 1 tablet (8 mg total) by mouth every evening.    Class: Normal    Route: Oral           Verify that the below list of medications is an accurate representation of the medications you are currently taking.  If none reported, the list may be blank. If incorrect, please contact your healthcare provider. Carry this list with you in case of emergency.           Current Medications     amlodipine (NORVASC) 10 MG tablet Take 1 tablet (10 mg total) by mouth once daily.    aspirin (ECOTRIN) 81 MG EC tablet Take 1 tablet (81 mg total) by mouth once daily.    losartan-hydrochlorothiazide 100-25 mg (HYZAAR) 100-25 mg per tablet Take 1 tablet by mouth once daily.    metoprolol succinate (TOPROL-XL) 50 MG 24 hr tablet Take 3 tablets (150 mg total) by mouth every evening.    oxycodone (ROXICODONE) 5 MG immediate release tablet Take 5 mg by mouth every 6 (six) hours as needed.     ramelteon (ROZEREM) 8 mg tablet Take 1 tablet (8 mg total) by mouth every evening.           Clinical Reference Information           Your Vitals Were     BP Pulse Temp Resp Height Weight    160/80 120 98.4 °F (36.9 °C) (Oral) 20 5' 5" (1.651 m) 39.2 kg (86 lb 6.7 oz)    SpO2 BMI             99% 14.38 kg/m2         Blood Pressure          Most Recent Value    BP  (!)  160/80      Allergies as of 3/28/2017     Lunesta [Eszopiclone]      Immunizations Administered on Date of Encounter - 3/28/2017     None      [facility] Sign-Up     Activating your [facility] account is as easy as 1-2-3!     1) Visit [email or web address], select Sign Up Now, enter this activation code and your date of birth, then select Next.  Activation code not generated  Current Patient Portal Status: Account disabled      2) Create a username and password to use when you visit [facility] in the future and select a security question in case you lose your password and select " Next.    3) Enter your e-mail address and click Sign Up!    Additional Information  If you have questions, please e-mail [email or web address] or call [phone] to talk to our [facility] staff. Remember, [facility] is NOT to be used for urgent needs. For medical emergencies, dial 911.         Smoking Cessation     If you would like to quit smoking:   You may be eligible for free services if you are a [place] resident and started smoking cigarettes before September 1, 1988.  Call the Smoking Cessation Trust (SCT) toll free at (843) 289-5101 or (870) 422-8250.   Call 5-298-QUIT-NOW if you do not meet the above criteria.            Language Assistance Services     ATTENTION: Language assistance services are available, free of charge. Please call [phone].      ATENCIÓN: Si habla jefe, tiene a arreola disposición servicios gratuitos de asistencia lingüística. Llame al [phone].     CHÚ Ý: N?u b?n nói Ti?ng Vi?t, có các d?ch v? h? tr? ngôn ng? mi?n phí dành cho b?n. G?i s? [phone].         [facility] complies with applicable Federal civil rights laws and does not discriminate on the basis of race, color, national origin, age, disability, or sex.

## 2017-04-27 ENCOUNTER — OFFICE VISIT (OUTPATIENT)
Dept: FAMILY MEDICINE | Facility: CLINIC | Age: 72
End: 2017-04-27
Payer: MEDICARE

## 2017-04-27 VITALS
WEIGHT: 89.19 LBS | OXYGEN SATURATION: 99 % | RESPIRATION RATE: 16 BRPM | HEART RATE: 66 BPM | HEIGHT: 60 IN | BODY MASS INDEX: 17.51 KG/M2 | DIASTOLIC BLOOD PRESSURE: 64 MMHG | SYSTOLIC BLOOD PRESSURE: 146 MMHG | TEMPERATURE: 98 F

## 2017-04-27 DIAGNOSIS — I10 ESSENTIAL HYPERTENSION: Chronic | ICD-10-CM

## 2017-04-27 DIAGNOSIS — F13.20 SEDATIVE HYPNOTIC OR ANXIOLYTIC DEPENDENCE: Chronic | ICD-10-CM

## 2017-04-27 DIAGNOSIS — G47.00 INSOMNIA, UNSPECIFIED TYPE: Primary | ICD-10-CM

## 2017-04-27 PROCEDURE — 99999 PR PBB SHADOW E&M-EST. PATIENT-LVL III: CPT | Mod: PBBFAC,,, | Performed by: FAMILY MEDICINE

## 2017-04-27 PROCEDURE — 1160F RVW MEDS BY RX/DR IN RCRD: CPT | Mod: S$GLB,,, | Performed by: FAMILY MEDICINE

## 2017-04-27 PROCEDURE — 3077F SYST BP >= 140 MM HG: CPT | Mod: S$GLB,,, | Performed by: FAMILY MEDICINE

## 2017-04-27 PROCEDURE — 99214 OFFICE O/P EST MOD 30 MIN: CPT | Mod: S$GLB,,, | Performed by: FAMILY MEDICINE

## 2017-04-27 PROCEDURE — 1159F MED LIST DOCD IN RCRD: CPT | Mod: S$GLB,,, | Performed by: FAMILY MEDICINE

## 2017-04-27 PROCEDURE — 99499 UNLISTED E&M SERVICE: CPT | Mod: S$GLB,,, | Performed by: FAMILY MEDICINE

## 2017-04-27 PROCEDURE — 1126F AMNT PAIN NOTED NONE PRSNT: CPT | Mod: S$GLB,,, | Performed by: FAMILY MEDICINE

## 2017-04-27 PROCEDURE — 3078F DIAST BP <80 MM HG: CPT | Mod: S$GLB,,, | Performed by: FAMILY MEDICINE

## 2017-04-27 RX ORDER — LORAZEPAM 0.5 MG/1
0.5 TABLET ORAL NIGHTLY
Qty: 30 TABLET | Refills: 2 | Status: SHIPPED | OUTPATIENT
Start: 2017-04-27 | End: 2017-08-17

## 2017-04-27 NOTE — MR AVS SNAPSHOT
[facility]  [address]  [place] [address]  Phone: [phone]  Fax: [phone]                  Ariadna Villegas   2017 1:00 PM   Office Visit    Description:  Female : 1945   Provider:  Kirti Morris MD   Department:  [facility] - Family Medicine           Reason for Visit     Hypertension     Follow-up           Diagnoses this Visit        Comments    Insomnia, unspecified type    -  Primary     Sedative hypnotic or anxiolytic dependence         Essential hypertension                To Do List           Goals (5 Years of Data)              Today    3/28/17    3/28/17    Blood Pressure < 130/80   140/62  140/62  140/62    Quit smoking / using tobacco             Follow-Up and Disposition     Return in about 1 month (around 2017) for Follow up.       These Medications        Disp Refills Start End    lorazepam (ATIVAN) 0.5 MG tablet 30 tablet 2 2017    Take 1 tablet (0.5 mg total) by mouth every evening. - Oral    [facility] [address] [place] [address] [address] [place] AT [facility] Ph #: [phone]         [facility] On Call     [facility] On [facility] Nurse Care Line -  Assistance  Unless otherwise directed by your provider, please contact [facility], our nurse care line that is available for  assistance.     Registered nurses in the [facility] Center provide: appointment scheduling, clinical advisement, health education, and other advisory services.  Call: [phone] (toll free)               Medications           Message regarding Medications     Verify the changes and/or additions to your medication regime listed below are the same as discussed with your clinician today.  If any of these changes or additions are incorrect, please notify your healthcare provider.        START taking these NEW medications        Refills    lorazepam (ATIVAN) 0.5 MG tablet 2    Sig: Take 1 tablet (0.5 mg total) by  mouth every evening.    Class: Print    Route: Oral      STOP taking these medications     ramelteon (ROZEREM) 8 mg tablet Take 1 tablet (8 mg total) by mouth every evening.           Verify that the below list of medications is an accurate representation of the medications you are currently taking.  If none reported, the list may be blank. If incorrect, please contact your healthcare provider. Carry this list with you in case of emergency.           Current Medications     amlodipine (NORVASC) 10 MG tablet Take 1 tablet (10 mg total) by mouth once daily.    aspirin (ECOTRIN) 81 MG EC tablet Take 1 tablet (81 mg total) by mouth once daily.    losartan-hydrochlorothiazide 100-25 mg (HYZAAR) 100-25 mg per tablet Take 1 tablet by mouth once daily.    metoprolol succinate (TOPROL-XL) 50 MG 24 hr tablet Take 3 tablets (150 mg total) by mouth every evening.    oxycodone (ROXICODONE) 5 MG immediate release tablet Take 5 mg by mouth every 6 (six) hours as needed.     lorazepam (ATIVAN) 0.5 MG tablet Take 1 tablet (0.5 mg total) by mouth every evening.           Clinical Reference Information           Your Vitals Were     BP Pulse Temp Resp Height Weight    140/62 66 97.9 °F (36.6 °C) (Oral) 16 5' (1.524 m) 40.5 kg (89 lb 2.8 oz)    SpO2 BMI             99% 17.42 kg/m2         Blood Pressure          Most Recent Value    BP  (!)  140/62      Allergies as of 4/27/2017     Lunesta [Eszopiclone]      Immunizations Administered on Date of Encounter - 4/27/2017     None      [facility] Sign-Up     Activating your [facility] account is as easy as 1-2-3!     1) Visit [email or web address], select Sign Up Now, enter this activation code and your date of birth, then select Next.  Activation code not generated  Current Patient Portal Status: Account disabled      2) Create a username and password to use when you visit [facility] in the future and select a security question in case you lose your password and select Next.    3) Enter your e-mail  address and click Sign Up!    Additional Information  If you have questions, please e-mail [email or web address] or call [phone] to talk to our [facility] staff. Remember, [facility] is NOT to be used for urgent needs. For medical emergencies, dial 911.         Smoking Cessation     If you would like to quit smoking:   You may be eligible for free services if you are a [place] resident and started smoking cigarettes before September 1, 1988.  Call the Smoking Cessation Trust ([facility]) toll free at (260) 242-5097 or (455) 600-8352.   Call 8-551-QUIT-NOW if you do not meet the above criteria.   Contact us via email: [email or web address]   View our website for more information: [email or web address]        Language Assistance Services     ATTENTION: Language assistance services are available, free of charge. Please call [phone].      ATENCIÓN: Si habla español, tiene a arreola disposición servicios gratuitos de asistencia lingüística. Llame al [phone].     CHÚ Ý: N?u b?n nói Ti?ng Vi?t, có các d?ch v? h? tr? ngôn ng? mi?n phí dành cho b?n. G?i s? [phone].         [facility] complies with applicable Federal civil rights laws and does not discriminate on the basis of race, color, national origin, age, disability, or sex.

## 2017-04-27 NOTE — PROGRESS NOTES
Chief Complaint   Patient presents with    Hypertension    Follow-up       HPI  Ariadna Villegas is a 72 y.o. female with multiple medical diagnoses as listed in the medical history and problem list that presents for follow-up for hypertension and insomnia.    She has not been able to sleep on the rozerem for more than a few hours at a time. Otherwise she has no complaints but has resorted to napping in the daytime because she is so tired.    PAST MEDICAL HISTORY:  Past Medical History:   Diagnosis Date    Arthritis     neck and back    COPD (chronic obstructive pulmonary disease)     Malignant hypertension     Osteoporosis, postmenopausal        PAST SURGICAL HISTORY:  Past Surgical History:   Procedure Laterality Date     x3      PARTIAL HYSTERECTOMY      , after her third     SALPINGOOPHORECTOMY      , for a ovarian cyst       SOCIAL HISTORY:  Social History     Social History    Marital status:      Spouse name: N/A    Number of children: N/A    Years of education: N/A     Occupational History    Retired      Social History Main Topics    Smoking status: Current Every Day Smoker     Packs/day: 0.50     Years: 60.00     Types: Cigarettes    Smokeless tobacco: Never Used    Alcohol use Yes      Comment: socially    Drug use: No    Sexual activity: No      Comment:      Other Topics Concern    Not on file     Social History Narrative       FAMILY HISTORY:  Family History   Problem Relation Age of Onset    Heart disease Mother     Diabetes Mother     Stroke Father     Heart disease Sister        ALLERGIES AND MEDICATIONS: updated and reviewed.  Review of patient's allergies indicates:   Allergen Reactions    Lunesta [eszopiclone] Other (See Comments)     Sleep walking     Current Outpatient Prescriptions   Medication Sig Dispense Refill    amlodipine (NORVASC) 10 MG tablet Take 1 tablet (10 mg total) by mouth once daily. 90 tablet 0    aspirin  (ECOTRIN) 81 MG EC tablet Take 1 tablet (81 mg total) by mouth once daily.      losartan-hydrochlorothiazide 100-25 mg (HYZAAR) 100-25 mg per tablet Take 1 tablet by mouth once daily. 90 tablet 0    metoprolol succinate (TOPROL-XL) 50 MG 24 hr tablet Take 3 tablets (150 mg total) by mouth every evening. 270 tablet 0    oxycodone (ROXICODONE) 5 MG immediate release tablet Take 5 mg by mouth every 6 (six) hours as needed.   0    lorazepam (ATIVAN) 0.5 MG tablet Take 1 tablet (0.5 mg total) by mouth every evening. 30 tablet 2     Current Facility-Administered Medications   Medication Dose Route Frequency Provider Last Rate Last Dose    cyanocobalamin injection 100 mcg  100 mcg Intramuscular Q30 Days MENDEL Wolf MD   100 mcg at 03/20/17 1337       ROS  Review of Systems   Constitutional: Negative for chills, diaphoresis, fatigue, fever and unexpected weight change.   HENT: Negative for rhinorrhea, sinus pressure, sore throat and tinnitus.    Eyes: Negative for photophobia and visual disturbance.   Respiratory: Negative for cough, shortness of breath and wheezing.    Cardiovascular: Negative for chest pain and palpitations.   Gastrointestinal: Negative for abdominal pain, blood in stool, constipation, diarrhea, nausea and vomiting.   Genitourinary: Negative for dysuria, flank pain, frequency and vaginal discharge.   Musculoskeletal: Negative for arthralgias and joint swelling.   Skin: Negative for rash.   Neurological: Negative for speech difficulty, weakness, light-headedness and headaches.   Psychiatric/Behavioral: Positive for sleep disturbance. Negative for behavioral problems and dysphoric mood.       Physical Exam  Vitals:    04/27/17 1255 04/27/17 1330   BP: (!) 140/62 (!) 146/64   Pulse: 66    Resp: 16    Temp: 97.9 °F (36.6 °C)    TempSrc: Oral    SpO2: 99%    Weight: 40.5 kg (89 lb 2.8 oz)    Height: 5' (1.524 m)     Body mass index is 17.42 kg/(m^2).  Weight: 40.5 kg (89 lb 2.8 oz)   Height: 5'  (152.4 cm)     Physical Exam   Constitutional: She is oriented to person, place, and time. She appears well-developed and well-nourished.   Eyes: EOM are normal.   Neurological: She is alert and oriented to person, place, and time.   Skin: Skin is warm and dry. No rash noted. No erythema.   Psychiatric: She has a normal mood and affect. Her behavior is normal.   Nursing note and vitals reviewed.      Health Maintenance       Date Due Completion Date    Lipid Panel 2/2/2017 2/2/2016    DEXA SCAN 3/25/2017 3/25/2014 (Declined)    Override on 3/25/2014: Declined (not at this present time)    Override on 6/12/2012: Done    Colonoscopy 1/1/2018 1/1/2008 (Done)    Override on 1/1/2008: Done    Override on 5/14/2006: Done    Mammogram 3/28/2019 3/28/2017 (Declined)    Override on 3/28/2017: Declined    Override on 3/25/2014: Declined (not at this present time)    Override on 5/14/2012: Done    TETANUS VACCINE 8/24/2026 8/24/2016 (Declined)    Override on 8/24/2016: Declined            ASSESSMENT     1. Insomnia, unspecified type    2. Sedative hypnotic or anxiolytic dependence    3. Essential hypertension        PLAN:     Insomnia, unspecified type  -she has tried multiple medications from other drug classes and gotten no relief, we will begin low dose ativan  She is willing to pay cash since her insurance may not cover it  -     lorazepam (ATIVAN) 0.5 MG tablet; Take 1 tablet (0.5 mg total) by mouth every evening.  Dispense: 30 tablet; Refill: 2    Sedative hypnotic or anxiolytic dependence  -     lorazepam (ATIVAN) 0.5 MG tablet; Take 1 tablet (0.5 mg total) by mouth every evening.  Dispense: 30 tablet; Refill: 2    Essential hypertension  -recheck at next visit, continue to monitor              Kirti Morris MD  04/27/2017 1:40 PM        Return in about 1 month (around 5/27/2017) for Follow up.

## 2017-05-08 ENCOUNTER — OFFICE VISIT (OUTPATIENT)
Dept: FAMILY MEDICINE | Facility: CLINIC | Age: 72
End: 2017-05-08
Payer: MEDICARE

## 2017-05-08 VITALS
BODY MASS INDEX: 17.37 KG/M2 | OXYGEN SATURATION: 97 % | DIASTOLIC BLOOD PRESSURE: 60 MMHG | HEIGHT: 60 IN | RESPIRATION RATE: 18 BRPM | HEART RATE: 72 BPM | WEIGHT: 88.5 LBS | SYSTOLIC BLOOD PRESSURE: 132 MMHG | TEMPERATURE: 98 F

## 2017-05-08 DIAGNOSIS — F13.20 SEDATIVE HYPNOTIC OR ANXIOLYTIC DEPENDENCE: Chronic | ICD-10-CM

## 2017-05-08 DIAGNOSIS — G47.00 INSOMNIA, UNSPECIFIED TYPE: Primary | ICD-10-CM

## 2017-05-08 PROCEDURE — 99499 UNLISTED E&M SERVICE: CPT | Mod: S$GLB,,, | Performed by: FAMILY MEDICINE

## 2017-05-08 PROCEDURE — 1159F MED LIST DOCD IN RCRD: CPT | Mod: S$GLB,,, | Performed by: FAMILY MEDICINE

## 2017-05-08 PROCEDURE — 96372 THER/PROPH/DIAG INJ SC/IM: CPT | Mod: S$GLB,,, | Performed by: FAMILY MEDICINE

## 2017-05-08 PROCEDURE — 1126F AMNT PAIN NOTED NONE PRSNT: CPT | Mod: S$GLB,,, | Performed by: FAMILY MEDICINE

## 2017-05-08 PROCEDURE — 99999 PR PBB SHADOW E&M-EST. PATIENT-LVL III: CPT | Mod: PBBFAC,,, | Performed by: FAMILY MEDICINE

## 2017-05-08 PROCEDURE — 3078F DIAST BP <80 MM HG: CPT | Mod: S$GLB,,, | Performed by: FAMILY MEDICINE

## 2017-05-08 PROCEDURE — 1157F ADVNC CARE PLAN IN RCRD: CPT | Mod: S$GLB,,, | Performed by: FAMILY MEDICINE

## 2017-05-08 PROCEDURE — 3075F SYST BP GE 130 - 139MM HG: CPT | Mod: S$GLB,,, | Performed by: FAMILY MEDICINE

## 2017-05-08 PROCEDURE — 1160F RVW MEDS BY RX/DR IN RCRD: CPT | Mod: S$GLB,,, | Performed by: FAMILY MEDICINE

## 2017-05-08 PROCEDURE — 99214 OFFICE O/P EST MOD 30 MIN: CPT | Mod: 25,S$GLB,, | Performed by: FAMILY MEDICINE

## 2017-05-08 RX ORDER — TEMAZEPAM 7.5 MG/1
7.5 CAPSULE ORAL NIGHTLY PRN
Qty: 30 CAPSULE | Refills: 2 | Status: SHIPPED | OUTPATIENT
Start: 2017-05-08 | End: 2017-06-07

## 2017-05-08 RX ORDER — TIZANIDINE 4 MG/1
TABLET ORAL
Refills: 2 | COMMUNITY
Start: 2017-04-24 | End: 2017-08-17

## 2017-05-08 RX ADMIN — CYANOCOBALAMIN 100 MCG: 1000 INJECTION, SOLUTION INTRAMUSCULAR; SUBCUTANEOUS at 03:05

## 2017-05-08 NOTE — PROGRESS NOTES
Chief Complaint   Patient presents with    Medication Management      insomnia       HPI  Ariadna Villegas is a 72 y.o. female with multiple medical diagnoses as listed in the medical history and problem list that presents for follow-up for insomnia. She tried ativan and it did not help her. She did have some feeling of depression and dizziness so she stopped taking it. She does worry at night also and this keeps her awake. She has taken xanax in the past when her  and mom .    Per chart review, she has taken ambien, sonata, restoril, valium and multiple medications. She is not sure which ones work.    PAST MEDICAL HISTORY:  Past Medical History:   Diagnosis Date    Arthritis     neck and back    COPD (chronic obstructive pulmonary disease)     Malignant hypertension     Osteoporosis, postmenopausal        PAST SURGICAL HISTORY:  Past Surgical History:   Procedure Laterality Date     x3      PARTIAL HYSTERECTOMY      , after her third     SALPINGOOPHORECTOMY      , for a ovarian cyst       SOCIAL HISTORY:  Social History     Social History    Marital status:      Spouse name: N/A    Number of children: N/A    Years of education: N/A     Occupational History    Retired      Social History Main Topics    Smoking status: Current Every Day Smoker     Packs/day: 0.50     Years: 60.00     Types: Cigarettes    Smokeless tobacco: Never Used    Alcohol use Yes      Comment: socially    Drug use: No    Sexual activity: No      Comment:      Other Topics Concern    Not on file     Social History Narrative       FAMILY HISTORY:  Family History   Problem Relation Age of Onset    Heart disease Mother     Diabetes Mother     Stroke Father     Heart disease Sister        ALLERGIES AND MEDICATIONS: updated and reviewed.  Review of patient's allergies indicates:   Allergen Reactions    Lunesta [eszopiclone] Other (See Comments)     Sleep walking     Current  Outpatient Prescriptions   Medication Sig Dispense Refill    amlodipine (NORVASC) 10 MG tablet Take 1 tablet (10 mg total) by mouth once daily. 90 tablet 0    aspirin (ECOTRIN) 81 MG EC tablet Take 1 tablet (81 mg total) by mouth once daily.      losartan-hydrochlorothiazide 100-25 mg (HYZAAR) 100-25 mg per tablet Take 1 tablet by mouth once daily. 90 tablet 0    metoprolol succinate (TOPROL-XL) 50 MG 24 hr tablet Take 3 tablets (150 mg total) by mouth every evening. 270 tablet 0    tizanidine (ZANAFLEX) 4 MG tablet TK 1 TO 2 TS PO 1 TIME IN THE DAV  2    lorazepam (ATIVAN) 0.5 MG tablet Take 1 tablet (0.5 mg total) by mouth every evening. 30 tablet 2    oxycodone (ROXICODONE) 5 MG immediate release tablet Take 5 mg by mouth every 6 (six) hours as needed.   0    temazepam (RESTORIL) 7.5 MG Cap Take 1 capsule (7.5 mg total) by mouth nightly as needed. 30 capsule 2     Current Facility-Administered Medications   Medication Dose Route Frequency Provider Last Rate Last Dose    cyanocobalamin injection 100 mcg  100 mcg Intramuscular Q30 Days MENDEL Wolf MD   100 mcg at 03/20/17 1337       ROS  Review of Systems   Constitutional: Negative for chills, diaphoresis, fatigue, fever and unexpected weight change.   HENT: Negative for rhinorrhea, sinus pressure, sore throat and tinnitus.    Eyes: Negative for photophobia and visual disturbance.   Respiratory: Negative for cough, shortness of breath and wheezing.    Cardiovascular: Negative for chest pain and palpitations.   Gastrointestinal: Negative for abdominal pain, blood in stool, constipation, diarrhea, nausea and vomiting.   Genitourinary: Negative for dysuria, flank pain, frequency and vaginal discharge.   Musculoskeletal: Negative for arthralgias and joint swelling.   Skin: Negative for rash.   Neurological: Negative for speech difficulty, weakness, light-headedness and headaches.   Psychiatric/Behavioral: Positive for sleep disturbance. Negative for  behavioral problems and dysphoric mood.       Physical Exam  Vitals:    05/08/17 1340   BP: 132/60   Pulse: 72   Resp: 18   Temp: 98.2 °F (36.8 °C)   TempSrc: Oral   SpO2: 97%   Weight: 40.2 kg (88 lb 8.2 oz)   Height: 5' (1.524 m)    Body mass index is 17.29 kg/(m^2).  Weight: 40.2 kg (88 lb 8.2 oz)   Height: 5' (152.4 cm)     Physical Exam   Constitutional: She is oriented to person, place, and time. She appears well-developed and well-nourished.   HENT:   Head: Normocephalic and atraumatic.   Eyes: EOM are normal.   Neurological: She is alert and oriented to person, place, and time.   Skin: Skin is warm and dry. No rash noted. No erythema.   Psychiatric: She has a normal mood and affect. Her behavior is normal.   Nursing note and vitals reviewed.      Health Maintenance       Date Due Completion Date    Lipid Panel 2/2/2017 2/2/2016    DEXA SCAN 3/25/2017 3/25/2014 (Declined)    Override on 3/25/2014: Declined (not at this present time)    Override on 6/12/2012: Done    Colonoscopy 1/1/2018 1/1/2008 (Done)    Override on 1/1/2008: Done    Override on 5/14/2006: Done    Mammogram 3/28/2019 3/28/2017 (Declined)    Override on 3/28/2017: Declined    Override on 3/25/2014: Declined (not at this present time)    Override on 5/14/2012: Done    TETANUS VACCINE 8/24/2026 8/24/2016 (Declined)    Override on 8/24/2016: Declined            ASSESSMENT     1. Insomnia, unspecified type    2. Sedative hypnotic or anxiolytic dependence        PLAN:     Insomnia, unspecified type  -begin restoril since she has taken it before for a period of time  May increase to two pills if needed  -     temazepam (RESTORIL) 7.5 MG Cap; Take 1 capsule (7.5 mg total) by mouth nightly as needed.  Dispense: 30 capsule; Refill: 2    Sedative hypnotic or anxiolytic dependence  -This is a chronic medical condition that is stable under the current regimen. Continue to monitor and we will make medication adjustments as needed.                 Kirti  MD Arturo  05/08/2017 2:37 PM        Return in about 1 month (around 6/8/2017) for Follow up.

## 2017-05-08 NOTE — MR AVS SNAPSHOT
[facility]  [address]  [place] [address]  Phone: [phone]  Fax: [phone]                  Ariadna Villegas   2017 1:40 PM   Office Visit    Description:  Female : 1945   Provider:  Kirti Morris MD   Department:  [facility]           Reason for Visit     Medication Management           Diagnoses this Visit        Comments    Insomnia, unspecified type    -  Primary     Sedative hypnotic or anxiolytic dependence                To Do List           Goals (5 Years of Data)              Today    17    Blood Pressure < 130/80   132/60  132/60  132/60    Quit smoking / using tobacco             Follow-Up and Disposition     Return in about 1 month (around 2017) for Follow up.       These Medications        Disp Refills Start End    temazepam (RESTORIL) 7.5 MG Cap 30 capsule 2 2017    Take 1 capsule (7.5 mg total) by mouth nightly as needed. - Oral    Pharmacy: [facility] [address] [place] [address] [place] [address] [place] [facility] #: [phone]         [facility] On Call     [facility] On Call Nurse Care Line -  Assistance  Unless otherwise directed by your provider, please contact [facility], our nurse care line that is available for  assistance.     Registered nurses in the [facility] provide: appointment scheduling, clinical advisement, health education, and other advisory services.  Call: [phone] (toll free)               Medications           Message regarding Medications     Verify the changes and/or additions to your medication regime listed below are the same as discussed with your clinician today.  If any of these changes or additions are incorrect, please notify your healthcare provider.        START taking these NEW medications        Refills    temazepam (RESTORIL) 7.5 MG Cap 2    Sig: Take 1 capsule (7.5 mg total) by mouth nightly as needed.    Class:  Print    Route: Oral           Verify that the below list of medications is an accurate representation of the medications you are currently taking.  If none reported, the list may be blank. If incorrect, please contact your healthcare provider. Carry this list with you in case of emergency.           Current Medications     amlodipine (NORVASC) 10 MG tablet Take 1 tablet (10 mg total) by mouth once daily.    aspirin (ECOTRIN) 81 MG EC tablet Take 1 tablet (81 mg total) by mouth once daily.    losartan-hydrochlorothiazide 100-25 mg (HYZAAR) 100-25 mg per tablet Take 1 tablet by mouth once daily.    metoprolol succinate (TOPROL-XL) 50 MG 24 hr tablet Take 3 tablets (150 mg total) by mouth every evening.    tizanidine (ZANAFLEX) 4 MG tablet TK 1 TO 2 TS PO 1 TIME IN THE DAV    lorazepam (ATIVAN) 0.5 MG tablet Take 1 tablet (0.5 mg total) by mouth every evening.    oxycodone (ROXICODONE) 5 MG immediate release tablet Take 5 mg by mouth every 6 (six) hours as needed.     temazepam (RESTORIL) 7.5 MG Cap Take 1 capsule (7.5 mg total) by mouth nightly as needed.           Clinical Reference Information           Your Vitals Were     BP Pulse Temp Resp Height Weight    132/60 72 98.2 °F (36.8 °C) (Oral) 18 5' (1.524 m) 40.2 kg (88 lb 8.2 oz)    SpO2 BMI             97% 17.29 kg/m2         Blood Pressure          Most Recent Value    BP  132/60      Allergies as of 5/8/2017     Lunesta [Eszopiclone]      Immunizations Administered on Date of Encounter - 5/8/2017     None      [facility] Sign-Up     Activating your [facility] account is as easy as 1-2-3!     1) Visit [email or web address], select Sign Up Now, enter this activation code and your date of birth, then select Next.  Activation code not generated  Current Patient Portal Status: Account disabled      2) Create a username and password to use when you visit [facility] in the future and select a security question in case you lose your password and select Next.    3) Enter your  e-mail address and click Sign Up!    Additional Information  If you have questions, please e-mail [email or web address] or call [phone] to talk to our [facility] staff. Remember, [facility] is NOT to be used for urgent needs. For medical emergencies, dial 911.         Smoking Cessation     If you would like to quit smoking:   You may be eligible for free services if you are a [place] resident and started smoking cigarettes before September 1, 1988.  Call the Smoking Cessation Trust ([facility]) toll free at [phone] or [phone].   Call 8-797-QUIT-NOW if you do not meet the above criteria.   Contact us via email: [email or web address]   View our website for more information: [email or web address]        Language Assistance Services     ATTENTION: Language assistance services are available, free of charge. Please call [phone].      ATENCIÓN: Si habla español, tiene a arreola disposición servicios gratuitos de asistencia lingüística. Llame al [phone].     CHÚ Ý: N?u b?n nói Ti?ng Vi?t, có các d?ch v? h? tr? ngôn ng? mi?n phí dành cho b?n. G?i s? [phone].         [facility] complies with applicable Federal civil rights laws and does not discriminate on the basis of race, color, national origin, age, disability, or sex.

## 2017-05-09 ENCOUNTER — TELEPHONE (OUTPATIENT)
Dept: FAMILY MEDICINE | Facility: CLINIC | Age: 72
End: 2017-05-09

## 2017-05-09 NOTE — TELEPHONE ENCOUNTER
Advised patient to call her insurance company to inquire about alternative medication which is the same as Restoril & notify us .

## 2017-05-09 NOTE — TELEPHONE ENCOUNTER
----- Message from Moon King sent at 5/9/2017  8:45 AM CDT -----  Contact: self  Pt is requesting a PA for temazepam (RESTORIL) 7.5 MG Cap. Please advise. [phone]

## 2017-05-10 NOTE — TELEPHONE ENCOUNTER
----- Message from Isabellarmando Hopkins sent at 5/9/2017 12:30 PM CDT -----  Contact: self   Pt request to the nurse regarding about her medication and insurance. Please contact [phone]. Thanks!

## 2017-05-28 DIAGNOSIS — I10 ESSENTIAL HYPERTENSION: Chronic | ICD-10-CM

## 2017-05-29 RX ORDER — AMLODIPINE BESYLATE 10 MG/1
TABLET ORAL
Qty: 90 TABLET | Refills: 0 | Status: SHIPPED | OUTPATIENT
Start: 2017-05-29 | End: 2017-09-07 | Stop reason: SDUPTHER

## 2017-08-17 ENCOUNTER — OFFICE VISIT (OUTPATIENT)
Dept: FAMILY MEDICINE | Facility: CLINIC | Age: 72
End: 2017-08-17
Payer: MEDICARE

## 2017-08-17 VITALS
DIASTOLIC BLOOD PRESSURE: 54 MMHG | BODY MASS INDEX: 16.18 KG/M2 | HEIGHT: 60 IN | SYSTOLIC BLOOD PRESSURE: 130 MMHG | HEART RATE: 67 BPM | RESPIRATION RATE: 16 BRPM | TEMPERATURE: 98 F | OXYGEN SATURATION: 97 % | WEIGHT: 82.44 LBS

## 2017-08-17 DIAGNOSIS — R63.0 POOR APPETITE: ICD-10-CM

## 2017-08-17 DIAGNOSIS — G47.00 INSOMNIA, UNSPECIFIED TYPE: Primary | ICD-10-CM

## 2017-08-17 DIAGNOSIS — F13.20 SEDATIVE HYPNOTIC OR ANXIOLYTIC DEPENDENCE: Chronic | ICD-10-CM

## 2017-08-17 PROCEDURE — 1126F AMNT PAIN NOTED NONE PRSNT: CPT | Mod: S$GLB,,, | Performed by: FAMILY MEDICINE

## 2017-08-17 PROCEDURE — 99999 PR PBB SHADOW E&M-EST. PATIENT-LVL III: CPT | Mod: PBBFAC,,, | Performed by: FAMILY MEDICINE

## 2017-08-17 PROCEDURE — 99499 UNLISTED E&M SERVICE: CPT | Mod: S$GLB,,, | Performed by: FAMILY MEDICINE

## 2017-08-17 PROCEDURE — 3075F SYST BP GE 130 - 139MM HG: CPT | Mod: S$GLB,,, | Performed by: FAMILY MEDICINE

## 2017-08-17 PROCEDURE — 3078F DIAST BP <80 MM HG: CPT | Mod: S$GLB,,, | Performed by: FAMILY MEDICINE

## 2017-08-17 PROCEDURE — 99214 OFFICE O/P EST MOD 30 MIN: CPT | Mod: S$GLB,,, | Performed by: FAMILY MEDICINE

## 2017-08-17 PROCEDURE — 1159F MED LIST DOCD IN RCRD: CPT | Mod: S$GLB,,, | Performed by: FAMILY MEDICINE

## 2017-08-17 PROCEDURE — 3008F BODY MASS INDEX DOCD: CPT | Mod: S$GLB,,, | Performed by: FAMILY MEDICINE

## 2017-08-17 RX ORDER — TEMAZEPAM 7.5 MG/1
7.5 CAPSULE ORAL NIGHTLY PRN
Qty: 60 CAPSULE | Refills: 0 | Status: CANCELLED | OUTPATIENT
Start: 2017-08-17

## 2017-08-17 RX ORDER — TEMAZEPAM 7.5 MG/1
CAPSULE ORAL
Qty: 60 CAPSULE | Refills: 2 | Status: SHIPPED | OUTPATIENT
Start: 2017-08-17 | End: 2017-11-20 | Stop reason: SDUPTHER

## 2017-08-17 RX ORDER — CYPROHEPTADINE HYDROCHLORIDE 4 MG/1
2 TABLET ORAL 3 TIMES DAILY PRN
Qty: 45 TABLET | Refills: 0 | Status: SHIPPED | OUTPATIENT
Start: 2017-08-17 | End: 2017-11-15

## 2017-08-17 RX ORDER — TEMAZEPAM 7.5 MG/1
CAPSULE ORAL
COMMUNITY
Start: 2017-07-12 | End: 2017-08-17

## 2017-08-17 NOTE — PROGRESS NOTES
Chief Complaint   Patient presents with    Insomnia    Medication Management    Follow-up    Medication Refill       HPI  Ariadna Villegas is a 72 y.o. female with multiple medical diagnoses as listed in the medical history and problem list that presents for follow-up for insomnia.     She has been taking the restoril although it costs her 90 dollars. She takes two at times and it helps her sleep. She has some concerns regarding her appetite and weight. She has lost 5lbs. There are days when she goes the whole day without eating. She is requesting an appetite stimulant    PAST MEDICAL HISTORY:  Past Medical History:   Diagnosis Date    Arthritis     neck and back    COPD (chronic obstructive pulmonary disease)     Malignant hypertension     Osteoporosis, postmenopausal        PAST SURGICAL HISTORY:  Past Surgical History:   Procedure Laterality Date     x3      PARTIAL HYSTERECTOMY      , after her third     SALPINGOOPHORECTOMY      , for a ovarian cyst       SOCIAL HISTORY:  Social History     Social History    Marital status:      Spouse name: N/A    Number of children: N/A    Years of education: N/A     Occupational History    Retired      Social History Main Topics    Smoking status: Current Every Day Smoker     Packs/day: 0.50     Years: 60.00     Types: Cigarettes    Smokeless tobacco: Never Used    Alcohol use Yes      Comment: socially    Drug use: No    Sexual activity: No      Comment:      Other Topics Concern    Not on file     Social History Narrative    No narrative on file       FAMILY HISTORY:  Family History   Problem Relation Age of Onset    Heart disease Mother     Diabetes Mother     Stroke Father     Heart disease Sister        ALLERGIES AND MEDICATIONS: updated and reviewed.  Review of patient's allergies indicates:   Allergen Reactions    Lunesta [eszopiclone] Other (See Comments)     Sleep walking     Current Outpatient  Prescriptions   Medication Sig Dispense Refill    amlodipine (NORVASC) 10 MG tablet TAKE 1 TABLET(10 MG) BY MOUTH EVERY DAY 90 tablet 0    aspirin (ECOTRIN) 81 MG EC tablet Take 1 tablet (81 mg total) by mouth once daily.      losartan-hydrochlorothiazide 100-25 mg (HYZAAR) 100-25 mg per tablet Take 1 tablet by mouth once daily. 90 tablet 0    metoprolol succinate (TOPROL-XL) 50 MG 24 hr tablet Take 3 tablets (150 mg total) by mouth every evening. 270 tablet 0    oxycodone (ROXICODONE) 5 MG immediate release tablet Take 5 mg by mouth every 6 (six) hours as needed.   0    cyproheptadine (PERIACTIN) 4 mg tablet Take 0.5 tablets (2 mg total) by mouth 3 (three) times daily as needed. 45 tablet 0    temazepam (RESTORIL) 7.5 MG Cap Take one to two nightly as needed for sleep 60 capsule 2     Current Facility-Administered Medications   Medication Dose Route Frequency Provider Last Rate Last Dose    cyanocobalamin injection 100 mcg  100 mcg Intramuscular Q30 Days MNEDEL Wolf MD   100 mcg at 05/08/17 1509       ROS  Review of Systems   Constitutional: Negative for chills, diaphoresis, fatigue, fever and unexpected weight change.   HENT: Negative for rhinorrhea, sinus pressure, sore throat and tinnitus.    Eyes: Negative for photophobia and visual disturbance.   Respiratory: Negative for cough, shortness of breath and wheezing.    Cardiovascular: Negative for chest pain and palpitations.   Gastrointestinal: Negative for abdominal pain, blood in stool, constipation, diarrhea, nausea and vomiting.   Genitourinary: Negative for dysuria, flank pain, frequency and vaginal discharge.   Musculoskeletal: Negative for arthralgias and joint swelling.   Skin: Negative for rash.   Neurological: Negative for speech difficulty, weakness, light-headedness and headaches.   Psychiatric/Behavioral: Positive for sleep disturbance. Negative for behavioral problems and dysphoric mood.       Physical Exam  Vitals:    08/17/17  1348   BP: (!) 130/54   Pulse: 67   Resp: 16   Temp: 98.2 °F (36.8 °C)   TempSrc: Oral   SpO2: 97%   Weight: 37.4 kg (82 lb 7.2 oz)   Height: 5' (1.524 m)    Body mass index is 16.1 kg/m².  Weight: 37.4 kg (82 lb 7.2 oz)   Height: 5' (152.4 cm)     Physical Exam   Constitutional: She is oriented to person, place, and time. She appears well-developed and well-nourished.   Eyes: EOM are normal.   Neurological: She is alert and oriented to person, place, and time.   Skin: Skin is warm and dry. No rash noted. No erythema.   Psychiatric: She has a normal mood and affect. Her behavior is normal.   Nursing note and vitals reviewed.      Health Maintenance       Date Due Completion Date    Lipid Panel 02/02/2017 2/2/2016    DEXA SCAN 03/25/2017 3/25/2014 (Declined)    Override on 3/25/2014: Declined (not at this present time)    Override on 6/12/2012: Done    Colonoscopy 01/01/2018 1/1/2008 (Done)    Override on 1/1/2008: Done    Override on 5/14/2006: Done    Mammogram 03/28/2019 3/28/2017 (Declined)    Override on 3/28/2017: Declined    Override on 3/25/2014: Declined (not at this present time)    Override on 5/14/2012: Done    TETANUS VACCINE 08/24/2026 8/24/2016 (Declined)    Override on 8/24/2016: Declined            ASSESSMENT     1. Insomnia, unspecified type    2. Sedative hypnotic or anxiolytic dependence    3. Poor appetite        PLAN:     Insomnia, unspecified type  -stable on current medication, continue  -     temazepam (RESTORIL) 7.5 MG Cap; Take one to two nightly as needed for sleep  Dispense: 60 capsule; Refill: 2    Sedative hypnotic or anxiolytic dependence  -stable on current medication, continue  -     temazepam (RESTORIL) 7.5 MG Cap; Take one to two nightly as needed for sleep  Dispense: 60 capsule; Refill: 2    Poor appetite  -will attempt this although her insurance may not cover it, may also benefit from megace, discussed making herself eat but she is very underweight  -     cyproheptadine  (PERIACTIN) 4 mg tablet; Take 0.5 tablets (2 mg total) by mouth 3 (three) times daily as needed.  Dispense: 45 tablet; Refill: 0              Kirti Morris MD  08/17/2017 3:02 PM        Return in about 3 months (around 11/17/2017) for Follow up.

## 2017-09-07 DIAGNOSIS — I10 ESSENTIAL HYPERTENSION: Chronic | ICD-10-CM

## 2017-09-07 RX ORDER — METOPROLOL SUCCINATE 50 MG/1
TABLET, EXTENDED RELEASE ORAL
Qty: 270 TABLET | Refills: 0 | Status: SHIPPED | OUTPATIENT
Start: 2017-09-07 | End: 2017-12-31 | Stop reason: SDUPTHER

## 2017-09-07 RX ORDER — LOSARTAN POTASSIUM AND HYDROCHLOROTHIAZIDE 25; 100 MG/1; MG/1
TABLET ORAL
Qty: 90 TABLET | Refills: 0 | Status: SHIPPED | OUTPATIENT
Start: 2017-09-07 | End: 2017-12-31 | Stop reason: SDUPTHER

## 2017-09-07 RX ORDER — AMLODIPINE BESYLATE 10 MG/1
TABLET ORAL
Qty: 90 TABLET | Refills: 0 | Status: SHIPPED | OUTPATIENT
Start: 2017-09-07 | End: 2017-12-31 | Stop reason: SDUPTHER

## 2017-11-12 ENCOUNTER — HOSPITAL ENCOUNTER (EMERGENCY)
Facility: HOSPITAL | Age: 72
Discharge: HOME | End: 2017-11-12
Attending: EMERGENCY MEDICINE
Payer: MEDICARE

## 2017-11-12 VITALS
OXYGEN SATURATION: 97 % | HEART RATE: 64 BPM | BODY MASS INDEX: 16.88 KG/M2 | WEIGHT: 86 LBS | TEMPERATURE: 98 F | SYSTOLIC BLOOD PRESSURE: 147 MMHG | DIASTOLIC BLOOD PRESSURE: 67 MMHG | HEIGHT: 60 IN | RESPIRATION RATE: 185 BRPM

## 2017-11-12 DIAGNOSIS — R07.9 CHEST PAIN: ICD-10-CM

## 2017-11-12 LAB
ALBUMIN SERPL BCP-MCNC: 3.5 G/DL
ALP SERPL-CCNC: 114 U/L
ALT SERPL W/O P-5'-P-CCNC: 7 U/L
ANION GAP SERPL CALC-SCNC: 10 MMOL/L
AST SERPL-CCNC: 17 U/L
BASOPHILS # BLD AUTO: 0.01 K/UL
BASOPHILS NFR BLD: 0.1 %
BILIRUB SERPL-MCNC: 0.2 MG/DL
BNP SERPL-MCNC: 31 PG/ML
BUN SERPL-MCNC: 40 MG/DL
CALCIUM SERPL-MCNC: 9.9 MG/DL
CHLORIDE SERPL-SCNC: 103 MMOL/L
CO2 SERPL-SCNC: 24 MMOL/L
CREAT SERPL-MCNC: 1.4 MG/DL
D DIMER PPP IA.FEU-MCNC: 0.79 MG/L FEU
DIFFERENTIAL METHOD: ABNORMAL
EOSINOPHIL # BLD AUTO: 0 K/UL
EOSINOPHIL NFR BLD: 0.2 %
ERYTHROCYTE [DISTWIDTH] IN BLOOD BY AUTOMATED COUNT: 14 %
EST. GFR  (AFRICAN AMERICAN): 43 ML/MIN/1.73 M^2
EST. GFR  (NON AFRICAN AMERICAN): 37 ML/MIN/1.73 M^2
GLUCOSE SERPL-MCNC: 112 MG/DL
HCT VFR BLD AUTO: 35.5 %
HGB BLD-MCNC: 12.1 G/DL
LYMPHOCYTES # BLD AUTO: 2.7 K/UL
LYMPHOCYTES NFR BLD: 24.8 %
MCH RBC QN AUTO: 29.7 PG
MCHC RBC AUTO-ENTMCNC: 34.1 G/DL
MCV RBC AUTO: 87 FL
MONOCYTES # BLD AUTO: 0.8 K/UL
MONOCYTES NFR BLD: 7.3 %
NEUTROPHILS # BLD AUTO: 7.5 K/UL
NEUTROPHILS NFR BLD: 67.6 %
PLATELET # BLD AUTO: 308 K/UL
PMV BLD AUTO: 8.8 FL
POTASSIUM SERPL-SCNC: 4.4 MMOL/L
PROT SERPL-MCNC: 7.5 G/DL
RBC # BLD AUTO: 4.07 M/UL
SODIUM SERPL-SCNC: 137 MMOL/L
TROPONIN I SERPL DL<=0.01 NG/ML-MCNC: 0.01 NG/ML
TROPONIN I SERPL DL<=0.01 NG/ML-MCNC: <0.006 NG/ML
WBC # BLD AUTO: 11.03 K/UL

## 2017-11-12 PROCEDURE — 85025 COMPLETE CBC W/AUTO DIFF WBC: CPT

## 2017-11-12 PROCEDURE — 25000003 PHARM REV CODE 250: Performed by: EMERGENCY MEDICINE

## 2017-11-12 PROCEDURE — 63600175 PHARM REV CODE 636 W HCPCS: Performed by: EMERGENCY MEDICINE

## 2017-11-12 PROCEDURE — 96374 THER/PROPH/DIAG INJ IV PUSH: CPT

## 2017-11-12 PROCEDURE — 25500020 PHARM REV CODE 255: Performed by: EMERGENCY MEDICINE

## 2017-11-12 PROCEDURE — 99284 EMERGENCY DEPT VISIT MOD MDM: CPT | Mod: 25

## 2017-11-12 PROCEDURE — 83880 ASSAY OF NATRIURETIC PEPTIDE: CPT

## 2017-11-12 PROCEDURE — 84484 ASSAY OF TROPONIN QUANT: CPT

## 2017-11-12 PROCEDURE — 85379 FIBRIN DEGRADATION QUANT: CPT

## 2017-11-12 PROCEDURE — 93010 ELECTROCARDIOGRAM REPORT: CPT | Mod: ,,, | Performed by: INTERNAL MEDICINE

## 2017-11-12 PROCEDURE — 80053 COMPREHEN METABOLIC PANEL: CPT

## 2017-11-12 PROCEDURE — 93005 ELECTROCARDIOGRAM TRACING: CPT

## 2017-11-12 RX ORDER — KETOROLAC TROMETHAMINE 30 MG/ML
15 INJECTION, SOLUTION INTRAMUSCULAR; INTRAVENOUS
Status: COMPLETED | OUTPATIENT
Start: 2017-11-12 | End: 2017-11-12

## 2017-11-12 RX ADMIN — KETOROLAC TROMETHAMINE 15 MG: 30 INJECTION, SOLUTION INTRAMUSCULAR at 06:11

## 2017-11-12 RX ADMIN — IOHEXOL 70 ML: 350 INJECTION, SOLUTION INTRAVENOUS at 05:11

## 2017-11-12 RX ADMIN — SODIUM CHLORIDE 1000 ML: 0.9 INJECTION, SOLUTION INTRAVENOUS at 04:11

## 2017-11-12 NOTE — ED TRIAGE NOTES
Pt arrived to ED c/o chest pain localized midsternal, coughing, SOB, deep breathing noted. Pt reports nausea, light headache denies vomiting.

## 2017-11-12 NOTE — ED PROVIDER NOTES
"Encounter Date: 2017    SCRIBE #1 NOTE: I, Amira Yoder, am scribing for, and in the presence of,  Lion Boyd MD. I have scribed the following portions of the note - Other sections scribed: HPI, ROS, and Physical Exam.       History     Chief Complaint   Patient presents with    Shortness of Breath     States she has been dx with pneumonia and was urgent care twice.  Also has chest pains associated with the SOB     CC: Shortness of Breath    HPI: The pt is a 72 y.o. F with a PMHx of COPD and malignant HTN and no pertinent PSHx or family hx who presents to the ED c/o SOB. Pt also reports cough and midsternal chest pain described as a "pressure" secondary to cough. Pt rates pain as severe (8/10) and states that pain exacerbates with deep breaths. Pt states that she went to urgent care yesterday c/o cough and chest pain. She had an x-ray taken there and received antibiotic shots and sent home with doxycycline and cough syrup that provided no relief. Pt reports going to urgent care again today due to exacerbation of chest pain with nausea and SOB that just started today. She was told to come here for evaluation. Pt is a smoker. Pt otherwise denies being drinker/drug use as well as fever, hemoptysis, and other associated symptoms.       The history is provided by the patient. No  was used.     Review of patient's allergies indicates:   Allergen Reactions    Lunesta [eszopiclone] Other (See Comments)     Sleep walking     Past Medical History:   Diagnosis Date    Arthritis     neck and back    COPD (chronic obstructive pulmonary disease)     Malignant hypertension     Osteoporosis, postmenopausal      Past Surgical History:   Procedure Laterality Date     x3      PARTIAL HYSTERECTOMY      , after her third     SALPINGOOPHORECTOMY      , for a ovarian cyst     Family History   Problem Relation Age of Onset    Heart disease Mother     Diabetes Mother     " "Stroke Father     Heart disease Sister      Social History   Substance Use Topics    Smoking status: Current Every Day Smoker     Packs/day: 0.50     Years: 60.00     Types: Cigarettes    Smokeless tobacco: Never Used    Alcohol use Yes      Comment: socially     Review of Systems   Constitutional: Negative for chills, diaphoresis and fever.   HENT: Negative for ear pain and sore throat.    Eyes: Negative for redness.   Respiratory: Positive for cough (dry) and shortness of breath.         (-) hemoptysis   Cardiovascular: Positive for chest pain (8/10, "pressure," midsternal, exacerbates w/ deep breaths).   Gastrointestinal: Positive for nausea. Negative for abdominal pain, diarrhea and vomiting.   Genitourinary: Negative for dysuria.   Musculoskeletal: Negative for back pain.   Skin: Negative for rash.   Neurological: Negative for headaches.       Physical Exam     Initial Vitals [11/12/17 1403]   BP Pulse Resp Temp SpO2   (!) 148/71 68 18 98.4 °F (36.9 °C) 100 %      MAP       96.67         Physical Exam    Nursing note and vitals reviewed.  Constitutional: She appears well-developed and well-nourished. No distress.   HENT:   Head: Normocephalic and atraumatic.   Nose: Nose normal.   Eyes: EOM are normal. Pupils are equal, round, and reactive to light.   Neck: Normal range of motion. Neck supple.   Cardiovascular: Normal rate, regular rhythm, normal heart sounds and intact distal pulses. Exam reveals no gallop and no friction rub.    Pulmonary/Chest: Breath sounds normal. No respiratory distress. She has no rhonchi. She has no rales. She exhibits no tenderness.   Abdominal: Soft. Normal appearance and bowel sounds are normal. She exhibits no distension. There is no tenderness. There is no rebound and no guarding.   Musculoskeletal: Normal range of motion. She exhibits no edema.   Neurological: She is oriented to person, place, and time. No cranial nerve deficit.   Skin: Skin is warm and dry.   Psychiatric: She " has a normal mood and affect. Her behavior is normal.         ED Course   Procedures  Labs Reviewed   CBC W/ AUTO DIFFERENTIAL - Abnormal; Notable for the following:        Result Value    Hematocrit 35.5 (*)     MPV 8.8 (*)     All other components within normal limits   COMPREHENSIVE METABOLIC PANEL - Abnormal; Notable for the following:     Glucose 112 (*)     BUN, Bld 40 (*)     ALT 7 (*)     eGFR if  43 (*)     eGFR if non  37 (*)     All other components within normal limits   D DIMER, QUANTITATIVE - Abnormal; Notable for the following:     D-Dimer 0.79 (*)     All other components within normal limits   TROPONIN I   B-TYPE NATRIURETIC PEPTIDE   TROPONIN I     EKG Readings: (Independently Interpreted)   Initial Reading: No STEMI. Rhythm: Normal Sinus Rhythm. Heart Rate: 64.          Medical Decision Making:   Initial Assessment:   71 yo female, smoker, with chest pain with cough x a few days. Ddx includes PNA, bronchitis, Pe. Less likely ACS given atypical nature of pain. EKG unchanged. CXR w/o infection. Dimer obtained given not PERCable due to age. Shows elevation dimer - CTA ordered, which also will give good indication of PNA. No PNA, but suspicious mass found. Discussed with patient. Given negative workup, normal vitals, well appearing patient, and pain greatly improved with toradol, will DC home with PCP f/u. Copy of CT results given to patient - patient already has f/u scheduled and can keep appointment.             Scribe Attestation:   Scribe #1: I performed the above scribed service and the documentation accurately describes the services I performed. I attest to the accuracy of the note.    Attending Attestation:           Physician Attestation for Scribe:  Physician Attestation Statement for Scribe #1: I, Lion Boyd MD, reviewed documentation, as scribed by Amira Yoder in my presence, and it is both accurate and complete.                 ED Course      Clinical  Impression:   The encounter diagnosis was Chest pain.    Disposition:   Disposition: Discharged  Condition: Stable                        Lion Boyd MD  11/16/17 3583

## 2017-11-13 NOTE — DISCHARGE INSTRUCTIONS
You were seen in the emergency department for chest pain made worse when coughing.  It seems most likely that this pain is related to the muscles in your chest wall.  It is difficult to say for certain that it is not related to your heart, though we believe that the chances are less.  It is very important that you follow-up with your primary care provider.  We did not see any evidence of large pneumonia on your CT scan.  We also did not see any evidence of a blood clot or acutely dangerous problem with your major arteries.  The CT scan did show a change from a prior CT scan appears to years ago.  This could be related to your emphysema,though it is important to discuss it with your primary care provider, as there is also small chance that it could be due to something more dangerous such as cancer.  We have attached a copy of the report to these discharge summaries.  Please return immediately for any new or worsening pain, difficulty breathing, coughing up blood, nausea, vomiting, or other new or worsening concerns

## 2017-11-20 ENCOUNTER — OFFICE VISIT (OUTPATIENT)
Dept: FAMILY MEDICINE | Facility: CLINIC | Age: 72
End: 2017-11-20
Payer: MEDICARE

## 2017-11-20 VITALS
HEART RATE: 66 BPM | SYSTOLIC BLOOD PRESSURE: 116 MMHG | WEIGHT: 84.69 LBS | TEMPERATURE: 98 F | OXYGEN SATURATION: 99 % | HEIGHT: 60 IN | RESPIRATION RATE: 16 BRPM | BODY MASS INDEX: 16.62 KG/M2 | DIASTOLIC BLOOD PRESSURE: 52 MMHG

## 2017-11-20 DIAGNOSIS — F17.200 TOBACCO USE DISORDER: ICD-10-CM

## 2017-11-20 DIAGNOSIS — J44.9 CHRONIC OBSTRUCTIVE PULMONARY DISEASE, UNSPECIFIED COPD TYPE: Primary | Chronic | ICD-10-CM

## 2017-11-20 DIAGNOSIS — G47.00 INSOMNIA, UNSPECIFIED TYPE: ICD-10-CM

## 2017-11-20 DIAGNOSIS — F13.20 SEDATIVE HYPNOTIC OR ANXIOLYTIC DEPENDENCE: Chronic | ICD-10-CM

## 2017-11-20 DIAGNOSIS — R93.89 ABNORMAL CT SCAN, CHEST: ICD-10-CM

## 2017-11-20 PROCEDURE — 99499 UNLISTED E&M SERVICE: CPT | Mod: S$GLB,,, | Performed by: FAMILY MEDICINE

## 2017-11-20 PROCEDURE — 99999 PR PBB SHADOW E&M-EST. PATIENT-LVL IV: CPT | Mod: PBBFAC,,, | Performed by: FAMILY MEDICINE

## 2017-11-20 PROCEDURE — 99214 OFFICE O/P EST MOD 30 MIN: CPT | Mod: S$GLB,,, | Performed by: FAMILY MEDICINE

## 2017-11-20 RX ORDER — TEMAZEPAM 7.5 MG/1
CAPSULE ORAL
Qty: 60 CAPSULE | Refills: 2 | Status: SHIPPED | OUTPATIENT
Start: 2017-11-20 | End: 2018-02-15 | Stop reason: SDUPTHER

## 2017-11-20 NOTE — PROGRESS NOTES
"Chief Complaint   Patient presents with    UC Follow Up    Pneumonia       HPI  Ariadna Villegas is a 72 y.o. female with multiple medical diagnoses as listed in the medical history and problem list that presents for evaluation for follow up from urgent care this past Saturday when she was seen and diagnosed with Pneumonia. She returned and was given a steroid shot and antibiotic shot. She returned again  not feeling any better and was given more medication. Her first urgent care x ray was negative for PNA. She was exposed to people in her home who had a cough, sinusitis and URI. She was having a cough along with pain in her chest. When she starts coughing, she has trouble stopping. No post tussive emesis. Cough is dry, cough starts with an "itch in her throat." she has had a lot of fatigue but has since stopped coughing. No wheezing.     PAST MEDICAL HISTORY:  Past Medical History:   Diagnosis Date    Arthritis     neck and back    COPD (chronic obstructive pulmonary disease)     Malignant hypertension     Osteoporosis, postmenopausal        PAST SURGICAL HISTORY:  Past Surgical History:   Procedure Laterality Date     x3      PARTIAL HYSTERECTOMY      , after her third     SALPINGOOPHORECTOMY      , for a ovarian cyst       SOCIAL HISTORY:  Social History     Social History    Marital status:      Spouse name: N/A    Number of children: N/A    Years of education: N/A     Occupational History    Retired      Social History Main Topics    Smoking status: Current Every Day Smoker     Packs/day: 0.50     Years: 60.00     Types: Cigarettes    Smokeless tobacco: Never Used    Alcohol use Yes      Comment: socially    Drug use: No    Sexual activity: No      Comment:      Other Topics Concern    Not on file     Social History Narrative    No narrative on file       FAMILY HISTORY:  Family History   Problem Relation Age of Onset    Heart disease Mother     " Diabetes Mother     Stroke Father     Heart disease Sister        ALLERGIES AND MEDICATIONS: updated and reviewed.  Review of patient's allergies indicates:   Allergen Reactions    Lunesta [eszopiclone] Other (See Comments)     Sleep walking     Current Outpatient Prescriptions   Medication Sig Dispense Refill    amlodipine (NORVASC) 10 MG tablet TAKE 1 TABLET(10 MG) BY MOUTH EVERY DAY 90 tablet 0    aspirin (ECOTRIN) 81 MG EC tablet Take 1 tablet (81 mg total) by mouth once daily.      losartan-hydrochlorothiazide 100-25 mg (HYZAAR) 100-25 mg per tablet TAKE 1 TABLET BY MOUTH EVERY DAY 90 tablet 0    metoprolol succinate (TOPROL-XL) 50 MG 24 hr tablet TAKE 3 TABLETS(150 MG) BY MOUTH EVERY EVENING 270 tablet 0    oxycodone (ROXICODONE) 5 MG immediate release tablet Take 5 mg by mouth every 6 (six) hours as needed.   0    temazepam (RESTORIL) 7.5 MG Cap Take one to two nightly as needed for sleep 60 capsule 2     Current Facility-Administered Medications   Medication Dose Route Frequency Provider Last Rate Last Dose    cyanocobalamin injection 100 mcg  100 mcg Intramuscular Q30 Days MENDEL Wolf MD   100 mcg at 05/08/17 1509       ROS  Review of Systems   Constitutional: Negative for chills, diaphoresis, fatigue, fever and unexpected weight change.   HENT: Negative for rhinorrhea, sinus pressure, sore throat and tinnitus.    Eyes: Negative for photophobia and visual disturbance.   Respiratory: Positive for cough. Negative for shortness of breath and wheezing.    Cardiovascular: Negative for chest pain and palpitations.   Gastrointestinal: Negative for abdominal pain, blood in stool, constipation, diarrhea, nausea and vomiting.   Genitourinary: Negative for dysuria, flank pain, frequency and vaginal discharge.   Musculoskeletal: Negative for arthralgias and joint swelling.   Skin: Negative for rash.   Neurological: Negative for speech difficulty, weakness, light-headedness and headaches.    Psychiatric/Behavioral: Negative for behavioral problems and dysphoric mood.       Physical Exam  Vitals:    11/20/17 1341   BP: (!) 116/52   Pulse: 66   Resp: 16   Temp: 97.9 °F (36.6 °C)   TempSrc: Oral   SpO2: 99%   Weight: 38.4 kg (84 lb 10.5 oz)   Height: 5' (1.524 m)    Body mass index is 16.53 kg/m².  Weight: 38.4 kg (84 lb 10.5 oz)   Height: 5' (152.4 cm)     Physical Exam   Constitutional: She is oriented to person, place, and time. She appears well-developed and well-nourished. No distress.   HENT:   Head: Normocephalic and atraumatic.   Eyes: EOM are normal.   Neck: Neck supple.   Cardiovascular: Normal rate and regular rhythm.  Exam reveals no gallop and no friction rub.    No murmur heard.  Pulmonary/Chest: Effort normal and breath sounds normal. No respiratory distress. She has no wheezes. She has no rales.   Lymphadenopathy:     She has no cervical adenopathy.   Neurological: She is alert and oriented to person, place, and time.   Skin: Skin is warm and dry. No rash noted.   Psychiatric: She has a normal mood and affect. Her behavior is normal.   Nursing note and vitals reviewed.      Health Maintenance       Date Due Completion Date    Lipid Panel 02/02/2017 2/2/2016    DEXA SCAN 03/25/2017 3/25/2014 (Declined)    Override on 3/25/2014: Declined (not at this present time)    Override on 6/12/2012: Done    Colonoscopy 01/01/2018 1/1/2008 (Done)    Override on 1/1/2008: Done    Override on 5/14/2006: Done    Mammogram 03/28/2019 3/28/2017 (Declined)    Override on 3/28/2017: Declined    Override on 3/25/2014: Declined (not at this present time)    Override on 5/14/2012: Done    TETANUS VACCINE 08/24/2026 8/24/2016 (Declined)    Override on 8/24/2016: Declined            ASSESSMENT     1. Chronic obstructive pulmonary disease, unspecified COPD type    2. Insomnia, unspecified type    3. Sedative hypnotic or anxiolytic dependence    4. Abnormal CT scan, chest    5. Tobacco use disorder        PLAN:      Problem List Items Addressed This Visit        Psychiatric    Sedative hypnotic or anxiolytic dependence (Chronic)    Current Assessment & Plan     This is a chronic medical condition that is stable under the current regimen. Continue to monitor and we will make medication adjustments as needed.            Relevant Medications    temazepam (RESTORIL) 7.5 MG Cap       Pulmonary    COPD (chronic obstructive pulmonary disease) - Primary (Chronic)    Current Assessment & Plan     -may have had an exacerbation, improved with steroids and antibiotics         Relevant Orders    Ambulatory referral to Pulmonology       Other    Tobacco use disorder  -needs to quit smoking, we discussed our smoking cessation clinic, she will attempt to quit on her own    Relevant Orders    Ambulatory referral to Pulmonology    Insomnia  -This is a chronic medical condition that is stable under the current regimen. Continue to monitor and we will make medication adjustments as needed.       Relevant Medications    temazepam (RESTORIL) 7.5 MG Cap      Other Visit Diagnoses     Abnormal CT scan, chest      Interval right upper lobe 2.1 cm irregular but primarily linear soft tissue density with slight nodularity. This could represent focal subsegmental atelectasis/scarring; however, underlying pulmonary neoplastic process cannot be entirely excluded. Correlate with any prior imaging during the interval to assess for stability. PET CT and/or biopsy may be warranted. Serial imaging is recommended as warranted.      Urgent pulm consult for repeat imaging vs biopsy    Relevant Orders    Ambulatory referral to Pulmonology            Kirti Morris MD  11/20/2017 2:13 PM        Return in about 3 months (around 2/20/2018) for Follow up.

## 2017-11-20 NOTE — ASSESSMENT & PLAN NOTE
This is a chronic medical condition that is stable under the current regimen. Continue to monitor and we will make medication adjustments as needed.

## 2017-12-08 ENCOUNTER — OFFICE VISIT (OUTPATIENT)
Dept: PULMONOLOGY | Facility: CLINIC | Age: 72
End: 2017-12-08
Payer: MEDICARE

## 2017-12-08 VITALS
DIASTOLIC BLOOD PRESSURE: 66 MMHG | BODY MASS INDEX: 17.01 KG/M2 | WEIGHT: 86.63 LBS | SYSTOLIC BLOOD PRESSURE: 128 MMHG | HEART RATE: 73 BPM | HEIGHT: 60 IN | OXYGEN SATURATION: 94 % | RESPIRATION RATE: 14 BRPM

## 2017-12-08 DIAGNOSIS — R91.1 LUNG NODULE: ICD-10-CM

## 2017-12-08 DIAGNOSIS — F17.200 TOBACCO USE DISORDER: Primary | ICD-10-CM

## 2017-12-08 DIAGNOSIS — J43.2 CENTRILOBULAR EMPHYSEMA: Chronic | ICD-10-CM

## 2017-12-08 PROCEDURE — 99999 PR PBB SHADOW E&M-EST. PATIENT-LVL III: CPT | Mod: PBBFAC,,, | Performed by: INTERNAL MEDICINE

## 2017-12-08 PROCEDURE — 99499 UNLISTED E&M SERVICE: CPT | Mod: S$GLB,,, | Performed by: INTERNAL MEDICINE

## 2017-12-08 PROCEDURE — 99214 OFFICE O/P EST MOD 30 MIN: CPT | Mod: S$GLB,,, | Performed by: INTERNAL MEDICINE

## 2017-12-08 NOTE — LETTER
December 8, 2017      Kirti Morris MD  [address] [facility]  [place] [address]           [facility]  [address]  [place] [address]  Phone: [phone]          Patient: Ariadna Villegas   MR Number: [id number]   YOB: 1945   Date of Visit: 12/8/2017       Dear Dr. Kirti Morris:    Thank you for referring Ariadna Villegas to me for evaluation. Attached you will find relevant portions of my assessment and plan of care.    If you have questions, please do not hesitate to call me. I look forward to following Ariadna Villegas along with you.    Sincerely,    RICHI Hager MD    Enclosure  CC:  No Recipients    If you would like to receive this communication electronically, please contact [email or web address] or [phone] to request more information on [employer] access.    For providers and/or their staff who would like to refer a patient to [facility], please contact us through our one-stop-shop provider referral line, Alonzo Campos, at [phone].    If you feel you have received this communication in error or would no longer like to receive these types of communications, please e-mail [email or web address]

## 2017-12-09 NOTE — PROGRESS NOTES
Subjective:       Patient ID: Ariadna Villegas is a 72 y.o. female.    Chief Complaint: Abnormal Ct Scan    HPI HISTORY OF PRESENT ILLNESS:  Mrs. Villegas is a 72-year-old smoker who comes to   follow up abnormal findings in the recent CT scan of the chest.  She developed   an increased cough approximately four weeks ago.  In association with this, she   had increased sputum production and shortness of breath.  She did not have any   chest pain or fever.  She was seen at an Urgent Care Clinic and treated with an   empiric antibiotic and corticosteroids.  When her symptoms were slow to resolve,   she returned there and had a chest x-ray and CT scan.  The latter was noted to   show a focal opacity in the right upper lobe.  Her respiratory symptoms have   largely resolved.  She denies any prominent shortness of breath with daily   activities.  She is not currently taking any prescribed medications for   respiratory symptoms.    Mrs. Reynaga does have evidence for emphysema related to long-term smoking.    Unfortunately, she has not been able to discontinue smoking.    DATA:  I reviewed the images from the chest x-ray and CT scan done last month.    There do not appear to be any acute cardiovascular abnormalities.  There are   changes of significant pulmonary overinflation related to advanced   obstructive lung disease.  There is a focal opacity in the anterior aspect of   the right upper lobe, which has nonspecific radiographic features.  In   comparison with a previous CT scan from 2015, it appears that there was a   minimal nodular opacity at this site in the prior scan.      CB/HN  dd: 12/08/2017 21:12:03 (CST)  td: 12/09/2017 10:38:49 (CST)  Doc ID   #[id number]  Job ID #[id number]    CC:       Review of Systems   Constitutional: Negative for fever and fatigue.   HENT: Negative for postnasal drip, sinus pressure, voice change and congestion.    Respiratory: Positive for cough and dyspnea on extertion. Negative for  sputum production, shortness of breath and wheezing.    Cardiovascular: Negative for chest pain and leg swelling.   Genitourinary: Negative for difficulty urinating.   Musculoskeletal: Positive for arthralgias. Negative for back pain.   Skin: Negative for rash.   Gastrointestinal: Negative for abdominal pain and acid reflux.   Neurological: Negative for dizziness and weakness.   Hematological: Negative for adenopathy.       Objective:      Physical Exam   Constitutional: She is oriented to person, place, and time. She appears well-developed. She appears cachectic.   HENT:   Head: Normocephalic.   Nose: Nose normal.   Mouth/Throat: No oropharyngeal exudate.   Neck: Normal range of motion. No JVD present. No tracheal deviation present. No thyromegaly present.   Cardiovascular: Normal rate, regular rhythm and normal heart sounds.    No murmur heard.  Pulmonary/Chest: Hyperinflation. No stridor. She has decreased breath sounds. She has no wheezes. She has no rhonchi. She has no rales. She exhibits no tenderness.   Abdominal: Soft. There is no tenderness.   Musculoskeletal: She exhibits no edema.   Lymphadenopathy:     She has no cervical adenopathy.   Neurological: She is alert and oriented to person, place, and time.   Skin: Skin is warm and dry. No rash noted. No erythema. Nails show no clubbing.   Psychiatric: She has a normal mood and affect.   Vitals reviewed.    Personal Diagnostic Review    No flowsheet data found.      Assessment:       1. Tobacco use disorder    2. Lung nodule    3. Centrilobular emphysema        Outpatient Encounter Prescriptions as of 12/8/2017   Medication Sig Dispense Refill    amlodipine (NORVASC) 10 MG tablet TAKE 1 TABLET(10 MG) BY MOUTH EVERY DAY 90 tablet 0    aspirin (ECOTRIN) 81 MG EC tablet Take 1 tablet (81 mg total) by mouth once daily.      losartan-hydrochlorothiazide 100-25 mg (HYZAAR) 100-25 mg per tablet TAKE 1 TABLET BY MOUTH EVERY DAY 90 tablet 0    metoprolol succinate  (TOPROL-XL) 50 MG 24 hr tablet TAKE 3 TABLETS(150 MG) BY MOUTH EVERY EVENING 270 tablet 0    oxycodone (ROXICODONE) 5 MG immediate release tablet Take 5 mg by mouth every 6 (six) hours as needed.   0    temazepam (RESTORIL) 7.5 MG Cap Take one to two nightly as needed for sleep 60 capsule 2     Facility-Administered Encounter Medications as of 12/8/2017   Medication Dose Route Frequency Provider Last Rate Last Dose    cyanocobalamin injection 100 mcg  100 mcg Intramuscular Q30 Days MENDEL Wolf MD   100 mcg at 05/08/17 1509     Orders Placed This Encounter   Procedures    CT Chest Without Contrast     Standing Status:   Future     Standing Expiration Date:   12/8/2018     Plan:     Urged to stop smoking.  Repeat CT Chest in 4-6 weeks ([facility]).  Consider IR consult for TTNBX if lesion remains unimproved.

## 2017-12-09 NOTE — PATIENT INSTRUCTIONS
Urged to stop smoking.  Repeat CT Chest in 4-6 weeks ([facility]).  Consider IR consult for TTNBX if lesion remains unimproved.

## 2017-12-26 ENCOUNTER — TELEPHONE (OUTPATIENT)
Dept: FAMILY MEDICINE | Facility: CLINIC | Age: 72
End: 2017-12-26

## 2017-12-26 DIAGNOSIS — F17.200 TOBACCO USE DISORDER: Primary | ICD-10-CM

## 2017-12-26 RX ORDER — IBUPROFEN 200 MG
1 TABLET ORAL DAILY
Qty: 30 PATCH | Refills: 1 | Status: SHIPPED | OUTPATIENT
Start: 2017-12-26 | End: 2019-01-10 | Stop reason: CLARIF

## 2017-12-26 NOTE — TELEPHONE ENCOUNTER
----- Message from Katharina James sent at 12/26/2017  1:28 PM CST -----  Contact: Self  Pt calling to speak to a nurse regarding to quick smoking. [phone].

## 2017-12-26 NOTE — TELEPHONE ENCOUNTER
Patient reports calling smoking cessation and states she was advised to contact her PCP for a prescription for patches.  Please advise.

## 2017-12-31 DIAGNOSIS — I10 ESSENTIAL HYPERTENSION: Chronic | ICD-10-CM

## 2018-01-02 RX ORDER — AMLODIPINE BESYLATE 10 MG/1
TABLET ORAL
Qty: 90 TABLET | Refills: 0 | Status: SHIPPED | OUTPATIENT
Start: 2018-01-02 | End: 2018-05-08 | Stop reason: SDUPTHER

## 2018-01-02 RX ORDER — LOSARTAN POTASSIUM AND HYDROCHLOROTHIAZIDE 25; 100 MG/1; MG/1
TABLET ORAL
Qty: 90 TABLET | Refills: 0 | Status: SHIPPED | OUTPATIENT
Start: 2018-01-02 | End: 2018-05-08 | Stop reason: SDUPTHER

## 2018-01-02 RX ORDER — METOPROLOL SUCCINATE 50 MG/1
TABLET, EXTENDED RELEASE ORAL
Qty: 270 TABLET | Refills: 0 | Status: SHIPPED | OUTPATIENT
Start: 2018-01-02 | End: 2018-07-16 | Stop reason: SDUPTHER

## 2018-01-08 ENCOUNTER — OFFICE VISIT (OUTPATIENT)
Dept: PULMONOLOGY | Facility: CLINIC | Age: 73
End: 2018-01-08
Payer: MEDICARE

## 2018-01-08 ENCOUNTER — HOSPITAL ENCOUNTER (OUTPATIENT)
Dept: RADIOLOGY | Facility: HOSPITAL | Age: 73
Discharge: HOME | End: 2018-01-08
Attending: INTERNAL MEDICINE
Payer: MEDICARE

## 2018-01-08 ENCOUNTER — HOSPITAL ENCOUNTER (OUTPATIENT)
Dept: PULMONOLOGY | Facility: CLINIC | Age: 73
Discharge: HOME | End: 2018-01-08
Payer: MEDICARE

## 2018-01-08 VITALS
DIASTOLIC BLOOD PRESSURE: 58 MMHG | SYSTOLIC BLOOD PRESSURE: 140 MMHG | HEIGHT: 60 IN | WEIGHT: 84 LBS | HEART RATE: 70 BPM | BODY MASS INDEX: 16.49 KG/M2 | OXYGEN SATURATION: 95 % | RESPIRATION RATE: 14 BRPM

## 2018-01-08 DIAGNOSIS — J43.2 CENTRILOBULAR EMPHYSEMA: Chronic | ICD-10-CM

## 2018-01-08 DIAGNOSIS — M54.6 THORACOLUMBAR BACK PAIN: ICD-10-CM

## 2018-01-08 DIAGNOSIS — J43.2 CENTRILOBULAR EMPHYSEMA: Primary | Chronic | ICD-10-CM

## 2018-01-08 DIAGNOSIS — R91.1 LUNG NODULE: ICD-10-CM

## 2018-01-08 DIAGNOSIS — M54.50 THORACOLUMBAR BACK PAIN: ICD-10-CM

## 2018-01-08 DIAGNOSIS — F17.200 TOBACCO USE DISORDER: ICD-10-CM

## 2018-01-08 LAB
PRE FEV1 FVC: 62
PRE FEV1: 1.43
PRE FVC: 2.3
PREDICTED FEV1 FVC: 80
PREDICTED FEV1: 1.84
PREDICTED FVC: 2.37

## 2018-01-08 PROCEDURE — 94010 BREATHING CAPACITY TEST: CPT | Mod: S$GLB,,, | Performed by: INTERNAL MEDICINE

## 2018-01-08 PROCEDURE — 99999 PR PBB SHADOW E&M-EST. PATIENT-LVL III: CPT | Mod: PBBFAC,,, | Performed by: INTERNAL MEDICINE

## 2018-01-08 PROCEDURE — 94729 DIFFUSING CAPACITY: CPT | Mod: S$GLB,,, | Performed by: INTERNAL MEDICINE

## 2018-01-08 PROCEDURE — 71250 CT THORAX DX C-: CPT | Mod: 26,,, | Performed by: RADIOLOGY

## 2018-01-08 PROCEDURE — 99214 OFFICE O/P EST MOD 30 MIN: CPT | Mod: 25,S$GLB,, | Performed by: INTERNAL MEDICINE

## 2018-01-08 PROCEDURE — 71250 CT THORAX DX C-: CPT | Mod: TC

## 2018-01-09 ENCOUNTER — TELEPHONE (OUTPATIENT)
Dept: PULMONOLOGY | Facility: CLINIC | Age: 73
End: 2018-01-09

## 2018-01-09 DIAGNOSIS — R91.1 LUNG NODULE: Primary | ICD-10-CM

## 2018-01-09 NOTE — TELEPHONE ENCOUNTER
Pt(daughter) informed that I reviewed the current CT with IR and discussed possible biopsy of the R lung lesion.  This does not appear to be a very good target to sample due to ill-defined nature and small size.  Risk for complications higher due to her emphysema.  I have recommended that she have continued observation with repeat CT and return visit here in 2-3 months.

## 2018-01-09 NOTE — PATIENT INSTRUCTIONS
Spirometry and DLCO.  Urged to stop smoking.  Review CT with IR and consider TTNBx if lesion is a suitable target.  If not, then continue monitoring with repeat CT and f/u here in 3 months.

## 2018-01-09 NOTE — PROGRESS NOTES
Subjective:       Patient ID: Ariadna Villegas is a 72 y.o. female.    Chief Complaint: Abnormal Ct Scan and COPD    HPI HISTORY OF PRESENT ILLNESS:  Mrs. Villegas is a 72-year-old smoker, who returns   for followup of a nodular opacity within the right lung.  Her previous visit   here was last month.  Prior to that, she had symptoms consistent with an acute   respiratory infection.  This illness was around November.  When symptoms   were slow to resolve she had a chest x-ray and subsequent CT scan.  These   revealed a right lung abnormality.  She had a repeat CT scan earlier today to   follow up on this finding.    Mrs. Villegas reports a mild residual cough, but no associated sputum production.    She has not been aware of any recent wheezing.  She feels that her daily   activities are limited more by chronic back pain than by respiratory symptoms.    She is not currently using any bronchodilator medications.  She is wearing a   nicotine patch in an effort to stop smoking.    Mrs. Villegas describes an intermittent pain in the posterolateral aspect of her   chest on the right.  This is a chronic symptom, which seems to wax and wane.    Usually the pain is improved by lying down.  This suggests that it may be   musculoskeletal in origin rather than pleuritic.    DATA:  I have reviewed the images from the CT scan done earlier today.  There   are no acute cardiovascular abnormalities.  There are no localized skeletal   abnormalities to correlate with her thoracic pain.  There continues to be a   focal opacity in the anterolateral aspect of the right upper lobe.  This is   irregular in its margins and density.  It does not appear significantly improved   when today's scan is compared to the prior study from November.  Note is also   made of emphysematous abnormalities with an upper lung zone predominance.      LAM/VALERIA  dd: 01/08/2018 20:07:32 (CST)  td: 01/09/2018 10:53:44 (CST)  Doc ID   #[id number]  Job ID  #[id number]    CC:       Review of Systems    Objective:      Physical Exam  Personal Diagnostic Review    No flowsheet data found.      Assessment:       1. Centrilobular emphysema    2. Lung nodule    3. Tobacco use disorder    4. Thoracolumbar back pain        Outpatient Encounter Prescriptions as of 1/8/2018   Medication Sig Dispense Refill    amLODIPine (NORVASC) 10 MG tablet TAKE 1 TABLET(10 MG) BY MOUTH EVERY DAY 90 tablet 0    aspirin (ECOTRIN) 81 MG EC tablet Take 1 tablet (81 mg total) by mouth once daily.      losartan-hydrochlorothiazide 100-25 mg (HYZAAR) 100-25 mg per tablet TAKE 1 TABLET BY MOUTH EVERY DAY 90 tablet 0    metoprolol succinate (TOPROL-XL) 50 MG 24 hr tablet TAKE 3 TABLETS(150 MG) BY MOUTH EVERY EVENING 270 tablet 0    nicotine (NICODERM CQ) 21 mg/24 hr Place 1 patch onto the skin once daily. 30 patch 1    oxycodone (ROXICODONE) 5 MG immediate release tablet Take 5 mg by mouth every 6 (six) hours as needed.   0    temazepam (RESTORIL) 7.5 MG Cap Take one to two nightly as needed for sleep 60 capsule 2     Facility-Administered Encounter Medications as of 1/8/2018   Medication Dose Route Frequency Provider Last Rate Last Dose    cyanocobalamin injection 100 mcg  100 mcg Intramuscular Q30 Days MENDEL Wolf MD   100 mcg at 05/08/17 1509     Orders Placed This Encounter   Procedures    Spirometry without bronchodilator     Standing Status:   Future     Number of Occurrences:   1     Standing Expiration Date:   1/8/2019    DLCO-Carbon Monoxide Diffusing Capacity     Standing Status:   Future     Number of Occurrences:   1     Standing Expiration Date:   1/8/2019     Plan:     Spirometry and DLCO.  Urged to stop smoking.  Review CT with IR and consider TTNBx if lesion is a suitable target.  If not, then continue monitoring with repeat CT and f/u here in 3 months.    NOTE:  Potential risks and benefits from biopsy discussed at today's visit,   including risk of pneumothorax given  her underlying emphysema.  32 min visit, all time counseling regarding CT findings and COPD management.

## 2018-01-29 ENCOUNTER — OFFICE VISIT (OUTPATIENT)
Dept: FAMILY MEDICINE | Facility: CLINIC | Age: 73
End: 2018-01-29
Payer: MEDICARE

## 2018-01-29 VITALS
HEART RATE: 71 BPM | TEMPERATURE: 99 F | SYSTOLIC BLOOD PRESSURE: 142 MMHG | WEIGHT: 86.06 LBS | BODY MASS INDEX: 16.9 KG/M2 | OXYGEN SATURATION: 97 % | DIASTOLIC BLOOD PRESSURE: 58 MMHG | HEIGHT: 60 IN

## 2018-01-29 DIAGNOSIS — L20.9 ATOPIC DERMATITIS, UNSPECIFIED TYPE: Primary | ICD-10-CM

## 2018-01-29 DIAGNOSIS — F13.20 SEDATIVE HYPNOTIC OR ANXIOLYTIC DEPENDENCE: Chronic | ICD-10-CM

## 2018-01-29 DIAGNOSIS — F17.200 TOBACCO DEPENDENCE: ICD-10-CM

## 2018-01-29 DIAGNOSIS — G47.00 INSOMNIA, UNSPECIFIED TYPE: ICD-10-CM

## 2018-01-29 DIAGNOSIS — L29.8 PRURITIC ERYTHEMATOUS RASH: ICD-10-CM

## 2018-01-29 PROCEDURE — 99214 OFFICE O/P EST MOD 30 MIN: CPT | Mod: S$GLB,,, | Performed by: NURSE PRACTITIONER

## 2018-01-29 PROCEDURE — 99999 PR PBB SHADOW E&M-EST. PATIENT-LVL IV: CPT | Mod: PBBFAC,,, | Performed by: NURSE PRACTITIONER

## 2018-01-29 RX ORDER — VARENICLINE TARTRATE 0.5 (11)-1
KIT ORAL
Qty: 1 PACKAGE | Refills: 0 | Status: SHIPPED | OUTPATIENT
Start: 2018-01-29 | End: 2018-02-20

## 2018-01-29 RX ORDER — HYDROXYZINE PAMOATE 25 MG/1
25 CAPSULE ORAL EVERY 8 HOURS PRN
Qty: 30 CAPSULE | Refills: 0 | Status: SHIPPED | OUTPATIENT
Start: 2018-01-29 | End: 2018-02-08

## 2018-01-29 RX ORDER — TRIAMCINOLONE ACETONIDE 5 MG/G
CREAM TOPICAL 2 TIMES DAILY
Qty: 15 G | Refills: 0 | Status: SHIPPED | OUTPATIENT
Start: 2018-01-29 | End: 2019-01-10 | Stop reason: CLARIF

## 2018-01-29 RX ORDER — TIZANIDINE 4 MG/1
4 TABLET ORAL NIGHTLY PRN
COMMUNITY
Start: 2018-01-15 | End: 2019-05-13

## 2018-01-29 NOTE — PROGRESS NOTES
History of Present Illness   Ariadna Villegas is a 72 y.o. woman with medical history as listed below who presents today for evaluation of rash for two weeks. Rash is located on the legs, back, and chest. The rash is pruritic and described as dry skin. She has tried topical Benadryl and Gold Bond with no relief. She denies fever or chills. She has no additional complaints and is otherwise healthy on today's visit.    Past Medical History:   Diagnosis Date    Arthritis     neck and back    COPD (chronic obstructive pulmonary disease)     Malignant hypertension     Osteoporosis, postmenopausal        Past Surgical History:   Procedure Laterality Date     x3      PARTIAL HYSTERECTOMY      , after her third     SALPINGOOPHORECTOMY      , for a ovarian cyst       Social History     Social History    Marital status:      Spouse name: N/A    Number of children: N/A    Years of education: N/A     Occupational History    Retired      Social History Main Topics    Smoking status: Former Smoker     Packs/day: 0.15     Years: 60.00     Types: Cigarettes    Smokeless tobacco: Never Used      Comment: 1-3 cigarettes a day    Alcohol use Yes      Comment: socially    Drug use: No    Sexual activity: No      Comment:      Other Topics Concern    None     Social History Narrative    None       Family History   Problem Relation Age of Onset    Heart disease Mother     Diabetes Mother     Stroke Father     Heart disease Sister        Review of Systems  Review of Systems   Constitutional: Negative for chills and fever.   HENT: Negative for sore throat.    Respiratory: Negative for shortness of breath, wheezing and stridor.    Skin: Positive for itching and rash.     A complete review of systems was otherwise negative.    Physical Exam  BP (!) 142/58 (BP Location: Left arm, Patient Position: Sitting, BP Method: Medium (Manual))   Pulse 71   Temp 98.5 °F (36.9 °C) (Oral)    Ht 5' (1.524 m)   Wt 39 kg (86 lb 1.4 oz)   SpO2 97%   BMI 16.81 kg/m²   General appearance: alert, appears stated age, cooperative and no distress  Skin: eczema - scattered  Neurologic: Grossly normal    Assessment/Plan  Atopic dermatitis, unspecified type  Kenalog cream, apply twice daily.  Hydroxyzine for itching, do not take with Restoril may cause increased sedation.  Handout provided on home management.  -     triamcinolone acetonide 0.5% (KENALOG) 0.5 % Crea; Apply topically 2 (two) times daily.  Dispense: 15 g; Refill: 0  -     hydrOXYzine pamoate (VISTARIL) 25 MG Cap; Take 1 capsule (25 mg total) by mouth every 8 (eight) hours as needed.  Dispense: 30 capsule; Refill: 0    Pruritic erythematous rash  As above.  -     triamcinolone acetonide 0.5% (KENALOG) 0.5 % Crea; Apply topically 2 (two) times daily.  Dispense: 15 g; Refill: 0  -     hydrOXYzine pamoate (VISTARIL) 25 MG Cap; Take 1 capsule (25 mg total) by mouth every 8 (eight) hours as needed.  Dispense: 30 capsule; Refill: 0    Tobacco dependence  Start Chantix, continue smoking cessation clinic.  -     varenicline (CHANTIX STARTING MONTH BOX) 0.5 mg (11)- 1 mg (42) tablet; Take one 0.5mg tab by mouth once daily X3 days,then increase to one 0.5mg tab twice daily X4 days,then increase to one 1mg tab twice daily  Dispense: 1 Package; Refill: 0    Insomnia, unspecified type  The current medical regimen is effective;  continue present plan and medications.  Need to follow-up with Dr. Morris.    Sedative hypnotic or anxiolytic dependence  Need to follow-up with Dr. Morris.    She has verbalized understanding and is in agreement with plan of care.    Follow-up if symptoms worsen or fail to improve.

## 2018-01-29 NOTE — PATIENT INSTRUCTIONS
Managing Atopic Dermatitis (Eczema)     After bathing, gently pat your skin dry (dont rub). Apply moisturizer while your skin is still damp.   To manage your symptoms and help reduce the severity and frequency, try these self-care tips:  Caring for your skin  · Use a gentle, fragrance-free cleanser (or nonsoap cleanser) for bathing. Rinse well. Pat skin dry.  · Take warm, not hot, baths or showers. Try to limit them to no more that 10 to 15 minutes.   · Use moisturizer liberally right after you bathe, while your skin is still damp.  · Avoid scratching because it will cause more damage to your skin.   · Topical, over-the-counter hydrocortisone cream may help control mild symptoms.   Controlling your environment  · Avoid extreme heat or cold.  · Avoid very humid or very dry air.  · If your home or office air is very dry, use a humidifier.  · Avoid allergens, such as dust, that may be present in bedding, carpets, plush toys, or rugs.  · Know that pet hair and dander can cause flare-ups.  Seeking medical treatment  Another way to keep symptoms under control is to seek medical treatment. Talk with your healthcare provider about the type of treatment that may work best for you. Your provider may prescribe treatments such as the following:  · Topical treatments to put on the skin daily  · Medicines taken by mouth (oral medicines), such as antihistamines, antibiotics, or corticosteroids  · In severe cases shots (injections) may be needed to control the symptoms. You may even need antibiotics if skin infections occur.  Treatments dont work the same way for every person. So if your symptoms continue or get worse, ask your healthcare provider about other treatments.  Making lifestyle choices  · Manage the stress in your life.  · Wear loose-fitting cotton clothing that does not bind or rub your skin.  · Avoid contact with wool or other scratchy fabrics.  · Use fragrance-free products.  Getting good results  Now that you  know more about atopic dermatitis, the next step is up to you. Follow your healthcare providers treatment plan and your self-care routine. This will help bring atopic dermatitis under control. If your symptoms persist, be sure to let your health care provider know.   Date Last Reviewed: 2/1/2017  © 1808-5405 [employer]. [address] [place] [address], [place] [address]. All rights reserved. This information is not intended as a substitute for professional medical care. Always follow your healthcare professional's instructions.

## 2018-02-15 DIAGNOSIS — G47.00 INSOMNIA, UNSPECIFIED TYPE: ICD-10-CM

## 2018-02-15 DIAGNOSIS — F13.20 SEDATIVE HYPNOTIC OR ANXIOLYTIC DEPENDENCE: Chronic | ICD-10-CM

## 2018-02-15 RX ORDER — TEMAZEPAM 7.5 MG/1
CAPSULE ORAL
Qty: 60 CAPSULE | Refills: 0 | Status: SHIPPED | OUTPATIENT
Start: 2018-02-15 | End: 2018-02-20 | Stop reason: SDUPTHER

## 2018-02-20 ENCOUNTER — OFFICE VISIT (OUTPATIENT)
Dept: FAMILY MEDICINE | Facility: CLINIC | Age: 73
End: 2018-02-20
Payer: MEDICARE

## 2018-02-20 VITALS
SYSTOLIC BLOOD PRESSURE: 120 MMHG | HEIGHT: 60 IN | BODY MASS INDEX: 17.27 KG/M2 | OXYGEN SATURATION: 97 % | WEIGHT: 87.94 LBS | TEMPERATURE: 98 F | HEART RATE: 63 BPM | DIASTOLIC BLOOD PRESSURE: 60 MMHG

## 2018-02-20 DIAGNOSIS — R91.1 LUNG NODULE: ICD-10-CM

## 2018-02-20 DIAGNOSIS — F41.9 ANXIETY: Primary | ICD-10-CM

## 2018-02-20 DIAGNOSIS — F13.20 SEDATIVE HYPNOTIC OR ANXIOLYTIC DEPENDENCE: Chronic | ICD-10-CM

## 2018-02-20 DIAGNOSIS — I70.1 RENAL ARTERY STENOSIS: Chronic | ICD-10-CM

## 2018-02-20 DIAGNOSIS — J43.2 CENTRILOBULAR EMPHYSEMA: Chronic | ICD-10-CM

## 2018-02-20 DIAGNOSIS — G47.00 INSOMNIA, UNSPECIFIED TYPE: ICD-10-CM

## 2018-02-20 PROCEDURE — 3008F BODY MASS INDEX DOCD: CPT | Mod: S$GLB,,, | Performed by: FAMILY MEDICINE

## 2018-02-20 PROCEDURE — 1126F AMNT PAIN NOTED NONE PRSNT: CPT | Mod: S$GLB,,, | Performed by: FAMILY MEDICINE

## 2018-02-20 PROCEDURE — 99499 UNLISTED E&M SERVICE: CPT | Mod: S$GLB,,, | Performed by: FAMILY MEDICINE

## 2018-02-20 PROCEDURE — 1159F MED LIST DOCD IN RCRD: CPT | Mod: S$GLB,,, | Performed by: FAMILY MEDICINE

## 2018-02-20 PROCEDURE — 99999 PR PBB SHADOW E&M-EST. PATIENT-LVL III: CPT | Mod: PBBFAC,,, | Performed by: FAMILY MEDICINE

## 2018-02-20 PROCEDURE — 99214 OFFICE O/P EST MOD 30 MIN: CPT | Mod: S$GLB,,, | Performed by: FAMILY MEDICINE

## 2018-02-20 RX ORDER — TEMAZEPAM 7.5 MG/1
CAPSULE ORAL
Qty: 60 CAPSULE | Refills: 2 | Status: SHIPPED | OUTPATIENT
Start: 2018-02-20 | End: 2018-04-05 | Stop reason: SDUPTHER

## 2018-02-20 RX ORDER — SERTRALINE HYDROCHLORIDE 50 MG/1
50 TABLET, FILM COATED ORAL DAILY
Qty: 90 TABLET | Refills: 0 | Status: SHIPPED | OUTPATIENT
Start: 2018-02-20 | End: 2018-05-15 | Stop reason: SDUPTHER

## 2018-02-20 NOTE — PROGRESS NOTES
Chief Complaint   Patient presents with    Insomnia    Anxiety       HPI  Ariadna Villegas is a 73 y.o. female with multiple medical diagnoses as listed in the medical history and problem list that presents for follow-up for insomnia and anxiety. She has had both her children with heart issues and her son had a stroke and she has been very worried about this. She has had been able to sleep but is very anxious and tearful. She has taken xanax in the past during her  death for CHF.     PAST MEDICAL HISTORY:  Past Medical History:   Diagnosis Date    Arthritis     neck and back    COPD (chronic obstructive pulmonary disease)     Malignant hypertension     Osteoporosis, postmenopausal        PAST SURGICAL HISTORY:  Past Surgical History:   Procedure Laterality Date     x3      PARTIAL HYSTERECTOMY      , after her third     SALPINGOOPHORECTOMY      , for a ovarian cyst       SOCIAL HISTORY:  Social History     Social History    Marital status:      Spouse name: N/A    Number of children: N/A    Years of education: N/A     Occupational History    Retired      Social History Main Topics    Smoking status: Former Smoker     Packs/day: 0.15     Years: 60.00     Types: Cigarettes    Smokeless tobacco: Never Used      Comment: 1-3 cigarettes a day    Alcohol use Yes      Comment: socially    Drug use: No    Sexual activity: No      Comment:      Other Topics Concern    Not on file     Social History Narrative    No narrative on file       FAMILY HISTORY:  Family History   Problem Relation Age of Onset    Heart disease Mother     Diabetes Mother     Stroke Father     Heart disease Sister        ALLERGIES AND MEDICATIONS: updated and reviewed.  Review of patient's allergies indicates:   Allergen Reactions    Lunesta [eszopiclone] Other (See Comments)     Sleep walking     Current Outpatient Prescriptions   Medication Sig Dispense Refill    amLODIPine  (NORVASC) 10 MG tablet TAKE 1 TABLET(10 MG) BY MOUTH EVERY DAY 90 tablet 0    aspirin (ECOTRIN) 81 MG EC tablet Take 1 tablet (81 mg total) by mouth once daily.      losartan-hydrochlorothiazide 100-25 mg (HYZAAR) 100-25 mg per tablet TAKE 1 TABLET BY MOUTH EVERY DAY 90 tablet 0    metoprolol succinate (TOPROL-XL) 50 MG 24 hr tablet TAKE 3 TABLETS(150 MG) BY MOUTH EVERY EVENING 270 tablet 0    nicotine (NICODERM CQ) 21 mg/24 hr Place 1 patch onto the skin once daily. 30 patch 1    oxycodone (ROXICODONE) 5 MG immediate release tablet Take 5 mg by mouth every 6 (six) hours as needed.   0    tiZANidine (ZANAFLEX) 4 MG tablet Take 4 mg by mouth nightly as needed.      sertraline (ZOLOFT) 50 MG tablet Take 1 tablet (50 mg total) by mouth once daily. 90 tablet 0    temazepam (RESTORIL) 7.5 MG Cap TAKE 1 TO 2 CAPSULES BY MOUTH EVERY NIGHT AS NEEDED FOR SLEEP 60 capsule 2    triamcinolone acetonide 0.5% (KENALOG) 0.5 % Crea Apply topically 2 (two) times daily. 15 g 0     Current Facility-Administered Medications   Medication Dose Route Frequency Provider Last Rate Last Dose    cyanocobalamin injection 100 mcg  100 mcg Intramuscular Q30 Days MENDEL Wolf MD   100 mcg at 05/08/17 1509       ROS  Review of Systems   Constitutional: Negative for chills, diaphoresis, fatigue, fever and unexpected weight change.   HENT: Negative for rhinorrhea, sinus pressure, sore throat and tinnitus.    Eyes: Negative for photophobia and visual disturbance.   Respiratory: Negative for cough, shortness of breath and wheezing.    Cardiovascular: Negative for chest pain and palpitations.   Gastrointestinal: Negative for abdominal pain, blood in stool, constipation, diarrhea, nausea and vomiting.   Genitourinary: Negative for dysuria, flank pain, frequency and vaginal discharge.   Musculoskeletal: Negative for arthralgias and joint swelling.   Skin: Negative for rash.   Neurological: Negative for speech difficulty, weakness,  light-headedness and headaches.   Psychiatric/Behavioral: Positive for dysphoric mood and sleep disturbance. Negative for behavioral problems. The patient is nervous/anxious.        Physical Exam  Vitals:    02/20/18 1340   BP: 120/60   BP Location: Right arm   Patient Position: Sitting   Pulse: 63   Temp: 98.1 °F (36.7 °C)   TempSrc: Oral   SpO2: 97%   Weight: 39.9 kg (87 lb 15.4 oz)   Height: 5' (1.524 m)    Body mass index is 17.18 kg/m².  Weight: 39.9 kg (87 lb 15.4 oz)   Height: 5' (152.4 cm)     Physical Exam   Constitutional: She is oriented to person, place, and time. She appears well-developed and well-nourished.   Eyes: EOM are normal.   Neurological: She is alert and oriented to person, place, and time.   Skin: Skin is warm and dry. No rash noted. No erythema.   Psychiatric: Her behavior is normal.   She is very anxious and tearful, no thoughts of harm   Nursing note and vitals reviewed.      Health Maintenance       Date Due Completion Date    High Dose Statin 01/30/1965 ---    Lipid Panel 02/02/2017 2/2/2016    DEXA SCAN 03/25/2017 3/25/2014 (Declined)    Override on 3/25/2014: Declined (not at this present time)    Override on 6/12/2012: Done    Colonoscopy 01/01/2018 1/1/2008 (Done)    Override on 1/1/2008: Done    Override on 5/14/2006: Done    Mammogram 03/28/2019 3/28/2017 (Declined)    Override on 3/28/2017: Declined    Override on 3/25/2014: Declined (not at this present time)    Override on 5/14/2012: Done    TETANUS VACCINE 08/24/2026 8/24/2016 (Declined)    Override on 8/24/2016: Declined            ASSESSMENT     1. Anxiety    2. Centrilobular emphysema    3. Renal artery stenosis    4. Insomnia, unspecified type    5. Sedative hypnotic or anxiolytic dependence    6. Lung nodule        PLAN:     Problem List Items Addressed This Visit        Psychiatric    Sedative hypnotic or anxiolytic dependence (Chronic)  -This is a chronic medical condition that is stable under the current regimen.  Continue to monitor and we will make medication adjustments as needed.       Relevant Medications    temazepam (RESTORIL) 7.5 MG Cap       Pulmonary    Centrilobular emphysema (Chronic)  -she is stable at this time    Lung nodule  -monitored by pulm, had a CT in January and is due within several months, showed a nodule and an opacity that was high risk for biopsy       Cardiac/Vascular    Renal artery stenosis (Chronic)  -This is a chronic medical condition that is stable under the current regimen. Continue to monitor and we will make medication adjustments as needed.          Other    Insomnia  -This is a chronic medical condition that is stable under the current regimen. Continue to monitor and we will make medication adjustments as needed.       Relevant Medications    temazepam (RESTORIL) 7.5 MG Cap      Other Visit Diagnoses     Anxiety    -  Primary  -begin zoloft 50mg with taking a half tablet for the first four days, then increase to a whole  -Discussed common side effects including drowsiness and upset stomach, along with any black box warnings if applicable. This drug can alter sleep patterns and mood and the patient will notify me if changes occur.               Kirti Morris MD  02/20/2018 2:28 PM        Follow-up in about 1 month (around 3/20/2018) for Follow up.

## 2018-04-05 ENCOUNTER — OFFICE VISIT (OUTPATIENT)
Dept: FAMILY MEDICINE | Facility: CLINIC | Age: 73
End: 2018-04-05
Payer: MEDICARE

## 2018-04-05 VITALS
BODY MASS INDEX: 16.88 KG/M2 | HEART RATE: 60 BPM | SYSTOLIC BLOOD PRESSURE: 124 MMHG | TEMPERATURE: 98 F | RESPIRATION RATE: 16 BRPM | HEIGHT: 60 IN | OXYGEN SATURATION: 99 % | DIASTOLIC BLOOD PRESSURE: 54 MMHG | WEIGHT: 86 LBS

## 2018-04-05 DIAGNOSIS — F13.20 SEDATIVE HYPNOTIC OR ANXIOLYTIC DEPENDENCE: Chronic | ICD-10-CM

## 2018-04-05 DIAGNOSIS — B35.4 TINEA CORPORIS: Primary | ICD-10-CM

## 2018-04-05 DIAGNOSIS — G47.00 INSOMNIA, UNSPECIFIED TYPE: ICD-10-CM

## 2018-04-05 DIAGNOSIS — B35.1 NAIL FUNGUS: ICD-10-CM

## 2018-04-05 DIAGNOSIS — Z12.31 ENCOUNTER FOR SCREENING MAMMOGRAM FOR BREAST CANCER: ICD-10-CM

## 2018-04-05 PROCEDURE — 3078F DIAST BP <80 MM HG: CPT | Mod: CPTII,S$GLB,, | Performed by: FAMILY MEDICINE

## 2018-04-05 PROCEDURE — 3074F SYST BP LT 130 MM HG: CPT | Mod: CPTII,S$GLB,, | Performed by: FAMILY MEDICINE

## 2018-04-05 PROCEDURE — 99999 PR PBB SHADOW E&M-EST. PATIENT-LVL III: CPT | Mod: PBBFAC,,, | Performed by: FAMILY MEDICINE

## 2018-04-05 PROCEDURE — 99214 OFFICE O/P EST MOD 30 MIN: CPT | Mod: S$GLB,,, | Performed by: FAMILY MEDICINE

## 2018-04-05 RX ORDER — CLOTRIMAZOLE 1 %
CREAM (GRAM) TOPICAL 2 TIMES DAILY
Qty: 28 G | Refills: 0 | Status: SHIPPED | OUTPATIENT
Start: 2018-04-05 | End: 2018-12-14

## 2018-04-05 RX ORDER — TERBINAFINE HYDROCHLORIDE 250 MG/1
250 TABLET ORAL DAILY
Qty: 90 TABLET | Refills: 1 | Status: SHIPPED | OUTPATIENT
Start: 2018-04-05 | End: 2019-08-15 | Stop reason: SDUPTHER

## 2018-04-05 RX ORDER — TEMAZEPAM 7.5 MG/1
CAPSULE ORAL
Qty: 60 CAPSULE | Refills: 2 | Status: SHIPPED | OUTPATIENT
Start: 2018-04-05 | End: 2018-04-10 | Stop reason: SDUPTHER

## 2018-04-05 NOTE — PROGRESS NOTES
Chief Complaint   Patient presents with    Anxiety    Follow-up    Rash     not better since last visit       HPI  Ariadna Villegas is a 73 y.o. female with multiple medical diagnoses as listed in the medical history and problem list that presents for follow-up for anxiety and insomnia and for a rash present on her lower back and left leg that was treated in January and is not improving. She has been taking triamcinolone.    Her anxiety is doing better on current medication. She did not start the zoloft but is taking the restoril, she is sleeping through the night.    PAST MEDICAL HISTORY:  Past Medical History:   Diagnosis Date    Arthritis     neck and back    COPD (chronic obstructive pulmonary disease)     Malignant hypertension     Osteoporosis, postmenopausal        PAST SURGICAL HISTORY:  Past Surgical History:   Procedure Laterality Date     x3      PARTIAL HYSTERECTOMY      , after her third     SALPINGOOPHORECTOMY      , for a ovarian cyst       SOCIAL HISTORY:  Social History     Social History    Marital status:      Spouse name: N/A    Number of children: N/A    Years of education: N/A     Occupational History    Retired      Social History Main Topics    Smoking status: Former Smoker     Packs/day: 0.15     Years: 60.00     Types: Cigarettes    Smokeless tobacco: Never Used      Comment: 1-3 cigarettes a day    Alcohol use Yes      Comment: socially    Drug use: No    Sexual activity: No      Comment:      Other Topics Concern    Not on file     Social History Narrative    No narrative on file       FAMILY HISTORY:  Family History   Problem Relation Age of Onset    Heart disease Mother     Diabetes Mother     Stroke Father     Heart disease Sister        ALLERGIES AND MEDICATIONS: updated and reviewed.  Review of patient's allergies indicates:   Allergen Reactions    Lunesta [eszopiclone] Other (See Comments)     Sleep walking     Current  Outpatient Prescriptions   Medication Sig Dispense Refill    amLODIPine (NORVASC) 10 MG tablet TAKE 1 TABLET(10 MG) BY MOUTH EVERY DAY 90 tablet 0    aspirin (ECOTRIN) 81 MG EC tablet Take 1 tablet (81 mg total) by mouth once daily.      losartan-hydrochlorothiazide 100-25 mg (HYZAAR) 100-25 mg per tablet TAKE 1 TABLET BY MOUTH EVERY DAY 90 tablet 0    metoprolol succinate (TOPROL-XL) 50 MG 24 hr tablet TAKE 3 TABLETS(150 MG) BY MOUTH EVERY EVENING 270 tablet 0    nicotine (NICODERM CQ) 21 mg/24 hr Place 1 patch onto the skin once daily. 30 patch 1    oxycodone (ROXICODONE) 5 MG immediate release tablet Take 5 mg by mouth every 6 (six) hours as needed.   0    sertraline (ZOLOFT) 50 MG tablet Take 1 tablet (50 mg total) by mouth once daily. 90 tablet 0    temazepam (RESTORIL) 7.5 MG Cap TAKE 1 TO 2 CAPSULES BY MOUTH EVERY NIGHT AS NEEDED FOR SLEEP 60 capsule 2    tiZANidine (ZANAFLEX) 4 MG tablet Take 4 mg by mouth nightly as needed.      clotrimazole (LOTRIMIN) 1 % cream Apply topically 2 (two) times daily. 28 g 0    terbinafine HCl (LAMISIL) 250 mg tablet Take 1 tablet (250 mg total) by mouth once daily. 90 tablet 1    triamcinolone acetonide 0.5% (KENALOG) 0.5 % Crea Apply topically 2 (two) times daily. 15 g 0     Current Facility-Administered Medications   Medication Dose Route Frequency Provider Last Rate Last Dose    cyanocobalamin injection 100 mcg  100 mcg Intramuscular Q30 Days MENDEL Wolf MD   100 mcg at 05/08/17 1509       ROS  Review of Systems   Constitutional: Negative for chills, diaphoresis, fatigue, fever and unexpected weight change.   HENT: Negative for rhinorrhea, sinus pressure, sore throat and tinnitus.    Eyes: Negative for photophobia and visual disturbance.   Respiratory: Negative for cough, shortness of breath and wheezing.    Cardiovascular: Negative for chest pain and palpitations.   Gastrointestinal: Negative for abdominal pain, blood in stool, constipation,  diarrhea, nausea and vomiting.   Genitourinary: Negative for dysuria, flank pain, frequency and vaginal discharge.   Musculoskeletal: Negative for arthralgias and joint swelling.   Skin: Negative for rash.   Neurological: Negative for speech difficulty, weakness, light-headedness and headaches.   Psychiatric/Behavioral: Positive for dysphoric mood and sleep disturbance. Negative for behavioral problems. The patient is nervous/anxious.        Physical Exam  Vitals:    04/05/18 1418   BP: (!) 124/54   Pulse: 60   Resp: 16   Temp: 98.1 °F (36.7 °C)   TempSrc: Oral   SpO2: 99%   Weight: 39 kg (86 lb)   Height: 5' (1.524 m)    Body mass index is 16.8 kg/m².  Weight: 39 kg (86 lb)   Height: 5' (152.4 cm)     Physical Exam   Constitutional: She is oriented to person, place, and time. She appears well-developed and well-nourished.   Eyes: EOM are normal.   Neurological: She is alert and oriented to person, place, and time.   Skin: Skin is warm and dry. Rash noted. No erythema.   She has very dry skin, note some lighter colored macules on her lower back and leg-resembles tinea in appearance   Psychiatric: She has a normal mood and affect. Her behavior is normal.   Nursing note and vitals reviewed.      Health Maintenance       Date Due Completion Date    High Dose Statin 01/30/1965 ---    Lipid Panel 02/02/2017 2/2/2016    DEXA SCAN 03/25/2017 3/25/2014 (Declined)    Override on 3/25/2014: Declined (not at this present time)    Override on 6/12/2012: Done    Colonoscopy 01/01/2018 1/1/2008 (Done)    Override on 1/1/2008: Done    Override on 5/14/2006: Done    Mammogram 03/28/2019 3/28/2017 (Declined)    Override on 3/28/2017: Declined    Override on 3/25/2014: Declined (not at this present time)    Override on 5/14/2012: Done    TETANUS VACCINE 08/24/2026 8/24/2016 (Declined)    Override on 8/24/2016: Declined            ASSESSMENT     1. Tinea corporis    2. Nail fungus    3. Insomnia, unspecified type    4. Sedative  hypnotic or anxiolytic dependence    5. Encounter for screening mammogram for breast cancer        PLAN:     Problem List Items Addressed This Visit        Psychiatric    Sedative hypnotic or anxiolytic dependence (Chronic)  -stable on current medication, continue    Relevant Medications    temazepam (RESTORIL) 7.5 MG Cap       Other    Insomnia  -stable on current medication, continue    Relevant Medications    temazepam (RESTORIL) 7.5 MG Cap      Other Visit Diagnoses     Tinea corporis    -  Primary  -try antifungal approach as it resembles tinea, consult derm if no improvement    Relevant Medications    clotrimazole (LOTRIMIN) 1 % cream    Nail fungus      -discussed that we will need to monitor her liver functions    Relevant Medications    terbinafine HCl (LAMISIL) 250 mg tablet    Encounter for screening mammogram for breast cancer      -as ordered       Relevant Orders    Mammo Digital Screening Bilat with CAD        She declined colon cancer screening, she is almost 74      Kirti Morris MD  04/05/2018 2:41 PM        Follow-up in about 3 months (around 7/5/2018) for Follow up.

## 2018-04-06 ENCOUNTER — HOSPITAL ENCOUNTER (OUTPATIENT)
Dept: RADIOLOGY | Facility: HOSPITAL | Age: 73
Discharge: HOME | End: 2018-04-06
Attending: FAMILY MEDICINE
Payer: MEDICARE

## 2018-04-06 DIAGNOSIS — Z12.31 ENCOUNTER FOR SCREENING MAMMOGRAM FOR BREAST CANCER: ICD-10-CM

## 2018-04-06 PROCEDURE — 77067 SCR MAMMO BI INCL CAD: CPT | Mod: 26,,, | Performed by: RADIOLOGY

## 2018-04-06 PROCEDURE — 77063 BREAST TOMOSYNTHESIS BI: CPT | Mod: 26,,, | Performed by: RADIOLOGY

## 2018-04-06 PROCEDURE — 77067 SCR MAMMO BI INCL CAD: CPT | Mod: TC,PO

## 2018-04-10 ENCOUNTER — TELEPHONE (OUTPATIENT)
Dept: FAMILY MEDICINE | Facility: CLINIC | Age: 73
End: 2018-04-10

## 2018-04-10 DIAGNOSIS — G47.00 INSOMNIA, UNSPECIFIED TYPE: ICD-10-CM

## 2018-04-10 DIAGNOSIS — F13.20 SEDATIVE HYPNOTIC OR ANXIOLYTIC DEPENDENCE: Chronic | ICD-10-CM

## 2018-04-10 NOTE — TELEPHONE ENCOUNTER
Spoke with patient to contact her insurance for explanation of  restoril not being filled r/t 90 pills allowed in one year. Verbalized understanding.

## 2018-04-10 NOTE — TELEPHONE ENCOUNTER
----- Message from Jyothi Elizondo sent at 4/10/2018  1:34 PM CDT -----  Contact: Self   Patient returned your call. Please call again at [phone].

## 2018-04-10 NOTE — TELEPHONE ENCOUNTER
----- Message from Netta Cedillo sent at 4/10/2018  3:28 PM CDT -----  Contact: self  Requesting a refill on temazepam (RESTORIL 15 mg 30 pills. Per patient, insurance company states 15mg is suggest.    Patient use [facility] [address] [facility] [address]  [place] [address]    Patient can be reached at [phone]    Thanks,

## 2018-04-11 RX ORDER — TEMAZEPAM 15 MG/1
15 CAPSULE ORAL NIGHTLY PRN
Qty: 30 CAPSULE | Refills: 2 | Status: SHIPPED | OUTPATIENT
Start: 2018-04-11 | End: 2018-07-17 | Stop reason: SDUPTHER

## 2018-04-11 RX ORDER — TEMAZEPAM 7.5 MG/1
CAPSULE ORAL
Qty: 60 CAPSULE | Refills: 2 | Status: SHIPPED | OUTPATIENT
Start: 2018-04-11 | End: 2018-04-11

## 2018-04-12 ENCOUNTER — TELEPHONE (OUTPATIENT)
Dept: FAMILY MEDICINE | Facility: CLINIC | Age: 73
End: 2018-04-12

## 2018-04-12 DIAGNOSIS — B35.9 TINEA: Primary | ICD-10-CM

## 2018-04-12 NOTE — TELEPHONE ENCOUNTER
It usually takes several weeks for her to see improvement, but I will refer her to a dermatologist.

## 2018-04-13 ENCOUNTER — TELEPHONE (OUTPATIENT)
Dept: FAMILY MEDICINE | Facility: CLINIC | Age: 73
End: 2018-04-13

## 2018-04-24 ENCOUNTER — TELEPHONE (OUTPATIENT)
Dept: FAMILY MEDICINE | Facility: CLINIC | Age: 73
End: 2018-04-24

## 2018-04-24 NOTE — LETTER
April 24, 2018    Ariadna Floodales  [address]  [facility] [address]             [address] [address]  Phone: [phone]  Fax: [phone] Dear Mrs. Villegas:    Sorry we were unable to contact you to schedule your Dermatology appointment. Please give the referral department a call at [phone].        If you have any questions or concerns, please don't hesitate to call.    Sincerely,        Bar Jernigan MA

## 2018-05-08 DIAGNOSIS — I10 ESSENTIAL HYPERTENSION: Chronic | ICD-10-CM

## 2018-05-08 RX ORDER — AMLODIPINE BESYLATE 10 MG/1
TABLET ORAL
Qty: 90 TABLET | Refills: 1 | Status: SHIPPED | OUTPATIENT
Start: 2018-05-08 | End: 2018-12-29 | Stop reason: SDUPTHER

## 2018-05-08 RX ORDER — LOSARTAN POTASSIUM AND HYDROCHLOROTHIAZIDE 25; 100 MG/1; MG/1
1 TABLET ORAL DAILY
Qty: 90 TABLET | Refills: 1 | Status: SHIPPED | OUTPATIENT
Start: 2018-05-08 | End: 2018-12-29 | Stop reason: SDUPTHER

## 2018-05-08 NOTE — TELEPHONE ENCOUNTER
----- Message from Sophie Ogden sent at 5/8/2018  2:05 PM CDT -----  Contact: Self/ [phone]  Pt following up on refill requests sent for [losartan andamLODIPine] . Please call back to advise. Thank you.  [facility] [address] [place] [address] [place] [address] AT [address]  [place] [address]  Phone: [phone] Fax: [phone]

## 2018-05-16 RX ORDER — SERTRALINE HYDROCHLORIDE 50 MG/1
TABLET, FILM COATED ORAL
Qty: 90 TABLET | Refills: 1 | Status: SHIPPED | OUTPATIENT
Start: 2018-05-16 | End: 2018-07-26 | Stop reason: SDUPTHER

## 2018-07-16 DIAGNOSIS — I10 ESSENTIAL HYPERTENSION: Chronic | ICD-10-CM

## 2018-07-16 NOTE — TELEPHONE ENCOUNTER
----- Message from Felicia Blank sent at 7/16/2018  3:55 PM CDT -----  Contact: self  Refill: metoprolol succinate (TOPROL-XL) 50 MG 24 hr tablet. Send to [address]. Contact pt at [phone].    Thanks-

## 2018-07-17 RX ORDER — TEMAZEPAM 15 MG/1
15 CAPSULE ORAL NIGHTLY PRN
Qty: 30 CAPSULE | Refills: 0 | Status: SHIPPED | OUTPATIENT
Start: 2018-07-17 | End: 2018-07-26 | Stop reason: SDUPTHER

## 2018-07-17 RX ORDER — METOPROLOL SUCCINATE 50 MG/1
TABLET, EXTENDED RELEASE ORAL
Qty: 270 TABLET | Refills: 0 | Status: SHIPPED | OUTPATIENT
Start: 2018-07-17 | End: 2018-12-29 | Stop reason: SDUPTHER

## 2018-07-17 NOTE — TELEPHONE ENCOUNTER
----- Message from Katharina James sent at 7/17/2018  1:28 PM CDT -----  Contact: Self   Pt calling to speak to a nurse regarding meds. [phone].

## 2018-07-26 ENCOUNTER — OFFICE VISIT (OUTPATIENT)
Dept: FAMILY MEDICINE | Facility: CLINIC | Age: 73
End: 2018-07-26
Payer: MEDICARE

## 2018-07-26 ENCOUNTER — PATIENT MESSAGE (OUTPATIENT)
Dept: FAMILY MEDICINE | Facility: CLINIC | Age: 73
End: 2018-07-26

## 2018-07-26 ENCOUNTER — HOSPITAL ENCOUNTER (OUTPATIENT)
Dept: RADIOLOGY | Facility: HOSPITAL | Age: 73
Discharge: HOME | End: 2018-07-26
Attending: FAMILY MEDICINE
Payer: MEDICARE

## 2018-07-26 VITALS
SYSTOLIC BLOOD PRESSURE: 122 MMHG | HEIGHT: 60 IN | BODY MASS INDEX: 17.47 KG/M2 | RESPIRATION RATE: 16 BRPM | WEIGHT: 89 LBS | DIASTOLIC BLOOD PRESSURE: 60 MMHG | HEART RATE: 53 BPM | OXYGEN SATURATION: 99 % | TEMPERATURE: 98 F

## 2018-07-26 DIAGNOSIS — M89.9 DISORDER OF BONE AND CARTILAGE: ICD-10-CM

## 2018-07-26 DIAGNOSIS — E53.8 B12 DEFICIENCY: ICD-10-CM

## 2018-07-26 DIAGNOSIS — R79.9 ABNORMAL FINDING OF BLOOD CHEMISTRY: ICD-10-CM

## 2018-07-26 DIAGNOSIS — M94.9 DISORDER OF BONE AND CARTILAGE: ICD-10-CM

## 2018-07-26 DIAGNOSIS — F13.20 SEDATIVE HYPNOTIC OR ANXIOLYTIC DEPENDENCE: Primary | Chronic | ICD-10-CM

## 2018-07-26 DIAGNOSIS — E78.2 MIXED HYPERLIPIDEMIA: Chronic | ICD-10-CM

## 2018-07-26 DIAGNOSIS — G47.00 INSOMNIA, UNSPECIFIED TYPE: ICD-10-CM

## 2018-07-26 DIAGNOSIS — Z12.11 SCREENING FOR COLON CANCER: ICD-10-CM

## 2018-07-26 DIAGNOSIS — F41.3 OTHER MIXED ANXIETY DISORDERS: ICD-10-CM

## 2018-07-26 DIAGNOSIS — F41.9 ANXIETY: ICD-10-CM

## 2018-07-26 DIAGNOSIS — J43.2 CENTRILOBULAR EMPHYSEMA: Chronic | ICD-10-CM

## 2018-07-26 DIAGNOSIS — E55.9 HYPOVITAMINOSIS D: ICD-10-CM

## 2018-07-26 DIAGNOSIS — R68.89 COLD INTOLERANCE: ICD-10-CM

## 2018-07-26 DIAGNOSIS — B35.1 NAIL FUNGUS: ICD-10-CM

## 2018-07-26 PROCEDURE — 77080 DXA BONE DENSITY AXIAL: CPT | Mod: TC

## 2018-07-26 PROCEDURE — 77080 DXA BONE DENSITY AXIAL: CPT | Mod: 26,,, | Performed by: RADIOLOGY

## 2018-07-26 PROCEDURE — 3074F SYST BP LT 130 MM HG: CPT | Mod: CPTII,S$GLB,, | Performed by: FAMILY MEDICINE

## 2018-07-26 PROCEDURE — 99999 PR PBB SHADOW E&M-EST. PATIENT-LVL V: CPT | Mod: PBBFAC,,, | Performed by: FAMILY MEDICINE

## 2018-07-26 PROCEDURE — 99214 OFFICE O/P EST MOD 30 MIN: CPT | Mod: S$GLB,,, | Performed by: FAMILY MEDICINE

## 2018-07-26 PROCEDURE — 3078F DIAST BP <80 MM HG: CPT | Mod: CPTII,S$GLB,, | Performed by: FAMILY MEDICINE

## 2018-07-26 RX ORDER — TEMAZEPAM 15 MG/1
15 CAPSULE ORAL NIGHTLY PRN
Qty: 30 CAPSULE | Refills: 2 | Status: SHIPPED | OUTPATIENT
Start: 2018-08-17 | End: 2018-09-16

## 2018-07-26 RX ORDER — SERTRALINE HYDROCHLORIDE 50 MG/1
TABLET, FILM COATED ORAL
Qty: 90 TABLET | Refills: 1 | Status: SHIPPED | OUTPATIENT
Start: 2018-07-26 | End: 2019-05-27

## 2018-07-26 NOTE — PROGRESS NOTES
Chief Complaint   Patient presents with    Anxiety    COPD    Follow-up       HPI  Ariadna Villegas is a 73 y.o. female with multiple medical diagnoses as listed in the medical history and problem list that presents for follow-up for anxiety and COPD. She has been stable and her anxiety has improved and she is taking the zoloft as needed. She is due for follow up with her pulmonologist. She has noted some improvement with her nails but has nail polish today. She reports concerns from her daughter that she is cold all of the time.     PAST MEDICAL HISTORY:  Past Medical History:   Diagnosis Date    Arthritis     neck and back    COPD (chronic obstructive pulmonary disease)     Malignant hypertension     Osteoporosis, postmenopausal        PAST SURGICAL HISTORY:  Past Surgical History:   Procedure Laterality Date     x3      HYSTERECTOMY      PARTIAL HYSTERECTOMY      , after her third     SALPINGOOPHORECTOMY      , for a ovarian cyst       SOCIAL HISTORY:  Social History     Social History    Marital status:      Spouse name: N/A    Number of children: N/A    Years of education: N/A     Occupational History    Retired      Social History Main Topics    Smoking status: Former Smoker     Packs/day: 0.15     Years: 60.00     Types: Cigarettes    Smokeless tobacco: Never Used      Comment: 1-3 cigarettes a day    Alcohol use Yes      Comment: socially    Drug use: No    Sexual activity: No      Comment:      Other Topics Concern    Not on file     Social History Narrative    No narrative on file       FAMILY HISTORY:  Family History   Problem Relation Age of Onset    Heart disease Mother     Diabetes Mother     Stroke Father     Heart disease Sister        ALLERGIES AND MEDICATIONS: updated and reviewed.  Review of patient's allergies indicates:   Allergen Reactions    Lunesta [eszopiclone] Other (See Comments)     Sleep walking     Current Outpatient  Prescriptions   Medication Sig Dispense Refill    amLODIPine (NORVASC) 10 MG tablet TAKE 1 TABLET(10 MG) BY MOUTH EVERY DAY 90 tablet 1    aspirin (ECOTRIN) 81 MG EC tablet Take 1 tablet (81 mg total) by mouth once daily.      losartan-hydrochlorothiazide 100-25 mg (HYZAAR) 100-25 mg per tablet Take 1 tablet by mouth once daily. 90 tablet 1    metoprolol succinate (TOPROL-XL) 50 MG 24 hr tablet TAKE 3 TABLETS(150 MG) BY MOUTH EVERY EVENING (Patient taking differently: TAKE 3 TABLETS(150 MG) BY MOUTH EVERY EVENING) 270 tablet 0    nicotine (NICODERM CQ) 21 mg/24 hr Place 1 patch onto the skin once daily. 30 patch 1    oxycodone (ROXICODONE) 5 MG immediate release tablet Take 5 mg by mouth every 6 (six) hours as needed.   0    [START ON 8/17/2018] temazepam (RESTORIL) 15 mg Cap Take 1 capsule (15 mg total) by mouth nightly as needed. 30 capsule 2    triamcinolone acetonide 0.5% (KENALOG) 0.5 % Crea Apply topically 2 (two) times daily. 15 g 0    clotrimazole (LOTRIMIN) 1 % cream Apply topically 2 (two) times daily. 28 g 0    sertraline (ZOLOFT) 50 MG tablet TAKE 1 TABLET(50 MG) BY MOUTH EVERY DAY 90 tablet 1    tiZANidine (ZANAFLEX) 4 MG tablet Take 4 mg by mouth nightly as needed.       Current Facility-Administered Medications   Medication Dose Route Frequency Provider Last Rate Last Dose    cyanocobalamin injection 100 mcg  100 mcg Intramuscular Q30 Days MENDEL Wolf MD   100 mcg at 05/08/17 1509       ROS  Review of Systems   Constitutional: Negative for chills, diaphoresis, fatigue, fever and unexpected weight change.   HENT: Negative for rhinorrhea, sinus pressure, sore throat and tinnitus.    Eyes: Negative for photophobia and visual disturbance.   Respiratory: Negative for cough, shortness of breath and wheezing.    Cardiovascular: Negative for chest pain and palpitations.   Gastrointestinal: Negative for abdominal pain, blood in stool, constipation, diarrhea, nausea and vomiting.    Genitourinary: Negative for dysuria, flank pain, frequency and vaginal discharge.   Musculoskeletal: Negative for arthralgias and joint swelling.   Skin: Negative for rash.   Neurological: Negative for speech difficulty, weakness, light-headedness and headaches.   Psychiatric/Behavioral: Negative for behavioral problems and dysphoric mood.       Physical Exam  Vitals:    07/26/18 1018   BP: 122/60   Pulse: (!) 53   Resp: 16   Temp: 98.3 °F (36.8 °C)   TempSrc: Oral   SpO2: 99%   Weight: 40.4 kg (89 lb)   Height: 5' (1.524 m)    Body mass index is 17.38 kg/m².  Weight: 40.4 kg (89 lb)   Height: 5' (152.4 cm)     Physical Exam   Constitutional: She is oriented to person, place, and time. She appears well-developed and well-nourished. No distress.   HENT:   Head: Normocephalic and atraumatic.   Eyes: EOM are normal.   Neck: Neck supple.   Cardiovascular: Normal rate and regular rhythm.  Exam reveals no gallop and no friction rub.    No murmur heard.  Pulmonary/Chest: Effort normal and breath sounds normal. No respiratory distress. She has no wheezes. She has no rales.   Neurological: She is alert and oriented to person, place, and time.   Skin: Skin is warm and dry. No rash noted.   Psychiatric: She has a normal mood and affect. Her behavior is normal.   Nursing note and vitals reviewed.      Health Maintenance       Date Due Completion Date    High Dose Statin 01/30/1965 ---    Lipid Panel 02/02/2017 2/2/2016    DEXA SCAN 03/25/2017 3/25/2014 (Declined)    Override on 3/25/2014: Declined (not at this present time)    Override on 6/12/2012: Done    Colonoscopy 01/01/2018 1/1/2008 (Done)    Override on 1/1/2008: Done    Override on 5/14/2006: Done    Mammogram 04/06/2020 4/6/2018    Override on 3/28/2017: Declined    Override on 3/25/2014: Declined (not at this present time)    Override on 5/14/2012: Done    TETANUS VACCINE 08/24/2026 8/24/2016 (Declined)    Override on 8/24/2016: Declined            ASSESSMENT     1.  Sedative hypnotic or anxiolytic dependence    2. Centrilobular emphysema    3. Nail fungus    4. Screening for colon cancer    5. Disorder of bone and cartilage    6. Insomnia, unspecified type    7. Anxiety    8. B12 deficiency    9. Hypovitaminosis D    10. Cold intolerance    11. Mixed hyperlipidemia    12. Abnormal finding of blood chemistry     13. Anxiety disorder         PLAN:     Problem List Items Addressed This Visit        Psychiatric    Sedative hypnotic or anxiolytic dependence - Primary (Chronic)  -restoril 15mg for insomnia, currently stable under        Pulmonary    COPD (chronic obstructive pulmonary disease) (Chronic)  -due for follow up with pulmonology       Cardiac/Vascular    Hyperlipidemia (Chronic)  -check lipid panel    Relevant Orders    Lipid panel       Endocrine    Hypovitaminosis D  -check vitamin d level    Relevant Orders    Vitamin D    B12 deficiency  -has hx of and has not had recent injection    Relevant Orders    CBC auto differential    Vitamin B12    Iron and TIBC    TSH       Other    Insomnia  -continue current dose of medication    Relevant Medications    temazepam (RESTORIL) 15 mg Cap (Start on 8/17/2018)      Other Visit Diagnoses     Nail fungus      -check liver function    Relevant Orders    Comprehensive metabolic panel    Screening for colon cancer      -as ordered       Relevant Orders    Case request GI: COLONOSCOPY (Completed)    Disorder of bone and cartilage      -as ordered    Relevant Orders    DXA Bone Density Spine And Hip    Anxiety      -continue current medication    Relevant Medications    sertraline (ZOLOFT) 50 MG tablet    Cold intolerance      -check thyroid levels    Abnormal finding of blood chemistry       -check TSH    Relevant Orders    Iron and TIBC    TSH    Anxiety disorder       -continue zoloft use, check TSH    Relevant Orders    TSH            Kirti Morris MD  07/26/2018 10:38 AM        Follow-up in about 3 months (around 10/26/2018) for  Follow up.

## 2018-07-31 ENCOUNTER — TELEPHONE (OUTPATIENT)
Dept: FAMILY MEDICINE | Facility: CLINIC | Age: 73
End: 2018-07-31

## 2018-07-31 DIAGNOSIS — J43.2 CENTRILOBULAR EMPHYSEMA: Primary | Chronic | ICD-10-CM

## 2018-08-02 ENCOUNTER — PATIENT MESSAGE (OUTPATIENT)
Dept: FAMILY MEDICINE | Facility: CLINIC | Age: 73
End: 2018-08-02

## 2018-08-02 DIAGNOSIS — E87.6 HYPOKALEMIA: Primary | ICD-10-CM

## 2018-08-02 DIAGNOSIS — E78.5 HYPERLIPIDEMIA, UNSPECIFIED HYPERLIPIDEMIA TYPE: ICD-10-CM

## 2018-08-02 RX ORDER — PRAVASTATIN SODIUM 20 MG/1
20 TABLET ORAL DAILY
Qty: 90 TABLET | Refills: 1 | Status: SHIPPED | OUTPATIENT
Start: 2018-08-02 | End: 2018-08-20

## 2018-08-02 NOTE — TELEPHONE ENCOUNTER
The 10-year ASCVD risk score (Yoli RONQUILLO Jr., et al., 2013) is: 17.6%    Values used to calculate the score:      Age: 73 years      Sex: Female      Is Non- : No      Diabetic: No      Tobacco smoker: No      Systolic Blood Pressure: 122 mmHg      Is BP treated: Yes      HDL Cholesterol: 59 mg/dL      Total Cholesterol: 221 mg/dL

## 2018-08-20 ENCOUNTER — TELEPHONE (OUTPATIENT)
Dept: FAMILY MEDICINE | Facility: CLINIC | Age: 73
End: 2018-08-20

## 2018-08-20 ENCOUNTER — OFFICE VISIT (OUTPATIENT)
Dept: CARDIOLOGY | Facility: CLINIC | Age: 73
End: 2018-08-20
Payer: MEDICARE

## 2018-08-20 VITALS
RESPIRATION RATE: 20 BRPM | BODY MASS INDEX: 17.22 KG/M2 | WEIGHT: 88.19 LBS | OXYGEN SATURATION: 89 % | HEART RATE: 66 BPM | DIASTOLIC BLOOD PRESSURE: 66 MMHG | SYSTOLIC BLOOD PRESSURE: 138 MMHG

## 2018-08-20 DIAGNOSIS — Z72.0 TOBACCO ABUSE: ICD-10-CM

## 2018-08-20 DIAGNOSIS — J43.1 PANLOBULAR EMPHYSEMA: ICD-10-CM

## 2018-08-20 DIAGNOSIS — R06.09 DYSPNEA ON EXERTION: ICD-10-CM

## 2018-08-20 DIAGNOSIS — I70.1 RENAL ARTERY STENOSIS: ICD-10-CM

## 2018-08-20 DIAGNOSIS — J43.8 OTHER EMPHYSEMA: ICD-10-CM

## 2018-08-20 DIAGNOSIS — R06.02 SHORTNESS OF BREATH: Primary | ICD-10-CM

## 2018-08-20 DIAGNOSIS — R09.89 OTHER SPECIFIED SYMPTOMS AND SIGNS INVOLVING THE CIRCULATORY AND RESPIRATORY SYSTEMS: ICD-10-CM

## 2018-08-20 DIAGNOSIS — I10 HTN (HYPERTENSION): ICD-10-CM

## 2018-08-20 DIAGNOSIS — E78.2 MIXED HYPERLIPIDEMIA: ICD-10-CM

## 2018-08-20 DIAGNOSIS — R42 DIZZINESS: ICD-10-CM

## 2018-08-20 DIAGNOSIS — I10 ESSENTIAL HYPERTENSION: ICD-10-CM

## 2018-08-20 DIAGNOSIS — I73.9 PVD (PERIPHERAL VASCULAR DISEASE): ICD-10-CM

## 2018-08-20 PROCEDURE — 3078F DIAST BP <80 MM HG: CPT | Mod: CPTII,S$GLB,, | Performed by: INTERNAL MEDICINE

## 2018-08-20 PROCEDURE — 93000 ELECTROCARDIOGRAM COMPLETE: CPT | Mod: S$GLB,,, | Performed by: INTERNAL MEDICINE

## 2018-08-20 PROCEDURE — 99999 PR PBB SHADOW E&M-EST. PATIENT-LVL III: CPT | Mod: PBBFAC,,, | Performed by: INTERNAL MEDICINE

## 2018-08-20 PROCEDURE — 99214 OFFICE O/P EST MOD 30 MIN: CPT | Mod: S$GLB,,, | Performed by: INTERNAL MEDICINE

## 2018-08-20 PROCEDURE — 3075F SYST BP GE 130 - 139MM HG: CPT | Mod: CPTII,S$GLB,, | Performed by: INTERNAL MEDICINE

## 2018-08-20 PROCEDURE — 93005 ELECTROCARDIOGRAM TRACING: CPT | Mod: S$GLB,,, | Performed by: INTERNAL MEDICINE

## 2018-08-20 RX ORDER — ROSUVASTATIN CALCIUM 20 MG/1
20 TABLET, COATED ORAL DAILY
Qty: 30 TABLET | Refills: 11 | Status: SHIPPED | OUTPATIENT
Start: 2018-08-20 | End: 2019-09-16 | Stop reason: SDUPTHER

## 2018-08-20 NOTE — TELEPHONE ENCOUNTER
Pharmacy requesting to change temazepam from 15 mg to 7.5 mg tk 2cs po hs, due to the 15 mg being on backorder.

## 2018-08-20 NOTE — PROGRESS NOTES
Subjective:    Patient ID:  Ariadna Villegas is a 73 y.o. female who presents for follow-up of No chief complaint on file.      HPI   previous history:  Here for follow-up of chest pain and hypertension.  She denies any worsening cardiopulmonary complaints.  She's not expressing chest pain, shortness breath or palpitations.  She denies any PND, orthopnea or lower edema.  She's not expressing dizziness, presyncope or syncope.  She still smoking a few cigarettes per day.  Otherwise she's able to do all her daily activities without any restrictions.  She mainly is complaining of difficulty sleeping at night.  We discussed several things in terms of sleep hygiene.      Today:  Here for follow-up of chest pain hypertension.  She was last seen over a year ago.  She has been having some variable changes in appetite.  Her weight has been stable but they previously saw spot on her lung concerning for possible malignancy.  [facility]  She needs surveillance screening possibly referral to another pulmonologist.  She has some shortness of breath and substernal heaviness again with heavy exertion but does not do much.  She is minimally active still smoking upwards of couple cigarettes a day.  She denies any PND, orthopnea or lower [place].  She has not experiencing dizziness to the point of presyncope or syncope.      Review of Systems   Constitution: Negative.   HENT: Negative.    Eyes: Negative.    Cardiovascular: Positive for chest pain and dyspnea on exertion. Negative for irregular heartbeat, leg swelling, near-syncope, orthopnea, palpitations, paroxysmal nocturnal dyspnea and syncope.   Respiratory: Positive for sleep disturbances due to breathing. Negative for shortness of breath.    Skin: Negative.    Musculoskeletal: Negative.    Gastrointestinal: Negative for abdominal pain, constipation and diarrhea.   Genitourinary: Negative for dysuria.   Neurological: Negative.     Psychiatric/Behavioral: The patient has insomnia.         Objective:    Physical Exam   Constitutional: She is oriented to person, place, and time. She appears well-developed and well-nourished.   HENT:   Head: Normocephalic and atraumatic.   Eyes: Conjunctivae and EOM are normal. Pupils are equal, round, and reactive to light.   Neck: Normal range of motion. Neck supple. No thyromegaly present.   Cardiovascular: Normal rate and regular rhythm.   No murmur heard.  Pulmonary/Chest: Effort normal and breath sounds normal. No respiratory distress.   Abdominal: Soft. Bowel sounds are normal.   Musculoskeletal: She exhibits no edema.   Neurological: She is alert and oriented to person, place, and time.   Skin: Skin is warm and dry.   Psychiatric: She has a normal mood and affect. Her behavior is normal.       echocardiogram:  11-16  CONCLUSIONS     1 - Normal left ventricular systolic function (EF 60-65%).     2 - Concentric remodeling.     3 - Left ventricular diastolic dysfunction.     4 - The estimated PA systolic pressure is 35 mmHg.     5 - Trivial mitral regurgitation.     6 - Trivial to mild tricuspid regurgitation.      NST:  Impression: NORMAL MYOCARDIAL PERFUSION  1. The perfusion scan is free of evidence for myocardial ischemia or injury.   2. Resting wall motion is physiologic.   3. Resting LV function is normal.   4. The ventricular volumes are normal at rest and stress.   5. The extracardiac distribution of radioactivity is normal.   6. When compared to the previous study from 06/10/2015, no change    Renal artery ultrasound:   Elevated velocities in the left proximal renal artery, suggesting significant/ >50% stenosis.  To a lesser degree, elevated velocities noted in the right renal artery, as well.    LDL- 144   7-18    Assessment:       1. Shortness of breath    2. Essential hypertension    3. Renal artery stenosis    4. Dizziness    5. PVD (peripheral vascular disease)    6. Mixed hyperlipidemia     7. Tobacco abuse    8. Other emphysema         Plan:       -Continue follow symptoms mainly, likely blockage and if any significant progression consider catheterization  -Continue medical therapy  -SBP stable, serum creatinine mildly elevated but prefers conservative therapy  -Counseled on tobacco cessation, refer to smoking clinic  -Stable on statin therapy (*prefer goal <100)  --> change to Crestor  -check renal artery ultrasound  -check carotid and lower extremity  -needs surveillance CT scan of chest with prior concern for malignancy     RTC 1 month with testing now

## 2018-08-21 RX ORDER — TEMAZEPAM 7.5 MG/1
15 CAPSULE ORAL NIGHTLY
Qty: 60 CAPSULE | Refills: 0 | Status: SHIPPED | OUTPATIENT
Start: 2018-08-21 | End: 2018-09-25 | Stop reason: SDUPTHER

## 2018-09-07 ENCOUNTER — HOSPITAL ENCOUNTER (OUTPATIENT)
Dept: RADIOLOGY | Facility: HOSPITAL | Age: 73
Discharge: HOME | End: 2018-09-07
Attending: INTERNAL MEDICINE
Payer: MEDICARE

## 2018-09-07 ENCOUNTER — HOSPITAL ENCOUNTER (OUTPATIENT)
Dept: CARDIOLOGY | Facility: HOSPITAL | Age: 73
Discharge: HOME | End: 2018-09-07
Attending: INTERNAL MEDICINE
Payer: MEDICARE

## 2018-09-07 DIAGNOSIS — I70.1 RENAL ARTERY STENOSIS: ICD-10-CM

## 2018-09-07 DIAGNOSIS — R06.09 DYSPNEA ON EXERTION: ICD-10-CM

## 2018-09-07 DIAGNOSIS — Z72.0 TOBACCO ABUSE: ICD-10-CM

## 2018-09-07 DIAGNOSIS — I10 ESSENTIAL HYPERTENSION: ICD-10-CM

## 2018-09-07 DIAGNOSIS — J43.1 PANLOBULAR EMPHYSEMA: ICD-10-CM

## 2018-09-07 DIAGNOSIS — R06.02 SHORTNESS OF BREATH: ICD-10-CM

## 2018-09-07 DIAGNOSIS — I73.9 PVD (PERIPHERAL VASCULAR DISEASE): ICD-10-CM

## 2018-09-07 DIAGNOSIS — R09.89 OTHER SPECIFIED SYMPTOMS AND SIGNS INVOLVING THE CIRCULATORY AND RESPIRATORY SYSTEMS: ICD-10-CM

## 2018-09-07 LAB
DIASTOLIC DYSFUNCTION: NO
DIASTOLIC DYSFUNCTION: NO
ESTIMATED PA SYSTOLIC PRESSURE: 27.01
GLOBAL PERICARDIAL EFFUSION: NORMAL
INTERNAL CAROTID STENOSIS: ABNORMAL
MITRAL VALVE MOBILITY: NORMAL
RETIRED EF AND QEF - SEE NOTES: 60 (ref 55–65)

## 2018-09-07 PROCEDURE — 78452 HT MUSCLE IMAGE SPECT MULT: CPT | Mod: 26,,, | Performed by: INTERNAL MEDICINE

## 2018-09-07 PROCEDURE — 63600175 PHARM REV CODE 636 W HCPCS

## 2018-09-07 PROCEDURE — 93880 EXTRACRANIAL BILAT STUDY: CPT | Mod: 26,,, | Performed by: INTERNAL MEDICINE

## 2018-09-07 PROCEDURE — 76770 US EXAM ABDO BACK WALL COMP: CPT | Mod: 26,XS,, | Performed by: RADIOLOGY

## 2018-09-07 PROCEDURE — 71250 CT THORAX DX C-: CPT | Mod: 26,,, | Performed by: RADIOLOGY

## 2018-09-07 PROCEDURE — 78452 HT MUSCLE IMAGE SPECT MULT: CPT | Mod: TC

## 2018-09-07 PROCEDURE — 93306 TTE W/DOPPLER COMPLETE: CPT

## 2018-09-07 PROCEDURE — 93018 CV STRESS TEST I&R ONLY: CPT | Mod: ,,, | Performed by: INTERNAL MEDICINE

## 2018-09-07 PROCEDURE — 93306 TTE W/DOPPLER COMPLETE: CPT | Mod: 26,,, | Performed by: INTERNAL MEDICINE

## 2018-09-07 PROCEDURE — 93017 CV STRESS TEST TRACING ONLY: CPT

## 2018-09-07 PROCEDURE — 93016 CV STRESS TEST SUPVJ ONLY: CPT | Mod: ,,, | Performed by: INTERNAL MEDICINE

## 2018-09-07 PROCEDURE — 93975 VASCULAR STUDY: CPT | Mod: TC

## 2018-09-07 PROCEDURE — 93880 EXTRACRANIAL BILAT STUDY: CPT

## 2018-09-07 PROCEDURE — 71250 CT THORAX DX C-: CPT | Mod: TC

## 2018-09-07 PROCEDURE — 93975 VASCULAR STUDY: CPT | Mod: 26,,, | Performed by: RADIOLOGY

## 2018-09-07 RX ORDER — REGADENOSON 0.08 MG/ML
INJECTION, SOLUTION INTRAVENOUS
Status: DISPENSED
Start: 2018-09-07 | End: 2018-09-07

## 2018-09-17 ENCOUNTER — OFFICE VISIT (OUTPATIENT)
Dept: CARDIOLOGY | Facility: CLINIC | Age: 73
End: 2018-09-17
Payer: MEDICARE

## 2018-09-17 VITALS
HEART RATE: 71 BPM | SYSTOLIC BLOOD PRESSURE: 96 MMHG | BODY MASS INDEX: 17.22 KG/M2 | WEIGHT: 88.19 LBS | DIASTOLIC BLOOD PRESSURE: 54 MMHG | RESPIRATION RATE: 80 BRPM

## 2018-09-17 DIAGNOSIS — Z72.0 TOBACCO ABUSE: ICD-10-CM

## 2018-09-17 DIAGNOSIS — I70.1 RENAL ARTERY STENOSIS: ICD-10-CM

## 2018-09-17 DIAGNOSIS — J43.1 PANLOBULAR EMPHYSEMA: ICD-10-CM

## 2018-09-17 DIAGNOSIS — I73.9 PVD (PERIPHERAL VASCULAR DISEASE): ICD-10-CM

## 2018-09-17 DIAGNOSIS — I10 ESSENTIAL HYPERTENSION: ICD-10-CM

## 2018-09-17 DIAGNOSIS — E78.2 MIXED HYPERLIPIDEMIA: ICD-10-CM

## 2018-09-17 DIAGNOSIS — R06.02 SHORTNESS OF BREATH: Primary | ICD-10-CM

## 2018-09-17 PROCEDURE — 99213 OFFICE O/P EST LOW 20 MIN: CPT | Mod: PBBFAC,PO | Performed by: INTERNAL MEDICINE

## 2018-09-17 PROCEDURE — 3074F SYST BP LT 130 MM HG: CPT | Mod: CPTII,,, | Performed by: INTERNAL MEDICINE

## 2018-09-17 PROCEDURE — 3078F DIAST BP <80 MM HG: CPT | Mod: CPTII,,, | Performed by: INTERNAL MEDICINE

## 2018-09-17 PROCEDURE — 99999 PR PBB SHADOW E&M-EST. PATIENT-LVL III: CPT | Mod: PBBFAC,,, | Performed by: INTERNAL MEDICINE

## 2018-09-17 PROCEDURE — 99214 OFFICE O/P EST MOD 30 MIN: CPT | Mod: S$PBB,,, | Performed by: INTERNAL MEDICINE

## 2018-09-17 PROCEDURE — 1101F PT FALLS ASSESS-DOCD LE1/YR: CPT | Mod: CPTII,,, | Performed by: INTERNAL MEDICINE

## 2018-09-17 NOTE — PROGRESS NOTES
Subjective:    Patient ID:  Ariadna Villegas is a 73 y.o. female who presents for follow-up of No chief complaint on file.      HPI   previous history:  Here for follow-up of chest pain hypertension.  She was last seen over a year ago.  She has been having some variable changes in appetite.  Her weight has been stable but they previously saw spot on her lung concerning for possible malignancy.  Her lung doctor however has retired in the interim.  She needs surveillance screening possibly referral to another pulmonologist.  She has some shortness of breath and substernal heaviness again with heavy exertion but does not do much.  She is minimally active still smoking upwards of couple cigarettes a day.  She denies any PND, orthopnea or lower Puja.  She has not experiencing dizziness to the point of presyncope or syncope.    Today:  Her follow-up chest pain, known PVD and hypertension.  She is here for follow-up of diagnostic testing as below.  She says her symptoms have not changed and are stable.  She had extensive testing which was unchanged from previous including left renal artery plaque but otherwise normal cardiac testing including echo and stress.  She had a CT scan done to follow-up and says that her pulmonologist has retired needs a new 1.  She denies any PND, orthopnea or lower extremity edema. She has not experienced any dizziness to the point of presyncope or syncope.      Review of Systems   Constitution: Negative.   HENT: Negative.    Eyes: Negative.    Cardiovascular: Positive for chest pain and dyspnea on exertion. Negative for irregular heartbeat, leg swelling, near-syncope, orthopnea, palpitations, paroxysmal nocturnal dyspnea and syncope.   Respiratory: Positive for sleep disturbances due to breathing. Negative for shortness of breath.    Skin: Negative.    Musculoskeletal: Negative.    Gastrointestinal: Negative for abdominal pain, constipation and diarrhea.   Genitourinary: Negative for dysuria.    Neurological: Negative.    Psychiatric/Behavioral: The patient has insomnia.         Objective:    Physical Exam   Constitutional: She is oriented to person, place, and time. She appears well-developed and well-nourished.   HENT:   Head: Normocephalic and atraumatic.   Eyes: Conjunctivae and EOM are normal. Pupils are equal, round, and reactive to light.   Neck: Normal range of motion. Neck supple. No thyromegaly present.   Cardiovascular: Normal rate and regular rhythm.   No murmur heard.  Pulmonary/Chest: Effort normal and breath sounds normal. No respiratory distress.   Abdominal: Soft. Bowel sounds are normal.   Musculoskeletal: She exhibits no edema.   Neurological: She is alert and oriented to person, place, and time.   Skin: Skin is warm and dry.   Psychiatric: She has a normal mood and affect. Her behavior is normal.       echocardiogram:  8-18  CONCLUSIONS     1 - Normal left ventricular systolic function (EF 60-65%).     2 - No wall motion abnormalities.      NST:  Impression: NORMAL MYOCARDIAL PERFUSION  1. The perfusion scan is free of evidence for myocardial ischemia or injury.   2. Resting wall motion is physiologic. There was abnormal septal motion possibly due to a conduction delay or post surgical changes.   3. Visually estimated LV function is normal.   4. The ventricular volumes are normal at rest and stress.   5. The extracardiac distribution of radioactivity is normal.   6. When compared to the previous study from 11/30/2016, abnormal septal motion now noted, otherwise normal perfusion/EF.    Renal artery ultrasound:  8-18  There are findings concerning for left renal artery stenosis at the origin of the left renal artery.    Ultrasound carotid:  CONCLUSIONS   There is 20 - 39% right Internal Carotid stenosis.  There is 40 - 49% left Internal Carotid stenosis.    LDL- 144   7-18    CT chest:  Irregular soft tissue opacity right upper lobe does demonstrate some increased soft tissue component  on today's study measuring at least 1.8 x 1.5 cm.  Pulmonary consultation and consideration for biopsy and/or PET scan suggested.    4 mm right lower lobe nodule less conspicuous on today's study.    Emphysematous changes noted.    Aortic annulus and coronary artery calcifications again noted.    Additional findings as above.    Assessment:       1. Shortness of breath    2. Essential hypertension    3. Tobacco abuse    4. PVD (peripheral vascular disease)    5. Renal artery stenosis    6. Panlobular emphysema    7. Mixed hyperlipidemia         Plan:       -continue medical therapy  -Continue follow symptoms mainly, likely blockage and if any significant progression consider catheterization  -SBP stable, serum creatinine mildly elevated but prefers conservative therapy (* with coral data currently would not stent renal artery)  -Counseled on tobacco cessation, refer to smoking clinic  -Stable on statin therapy (*prefer goal <100)  --> change to Crestor  -refer back to Pulmonary with abnormal CT scan     RTC 6 months

## 2018-09-25 RX ORDER — TEMAZEPAM 7.5 MG/1
CAPSULE ORAL
Qty: 60 CAPSULE | Refills: 0 | Status: SHIPPED | OUTPATIENT
Start: 2018-09-25 | End: 2018-10-25 | Stop reason: SDUPTHER

## 2018-10-25 ENCOUNTER — OFFICE VISIT (OUTPATIENT)
Dept: FAMILY MEDICINE | Facility: CLINIC | Age: 73
End: 2018-10-25
Payer: MEDICARE

## 2018-10-25 VITALS
OXYGEN SATURATION: 98 % | BODY MASS INDEX: 15.71 KG/M2 | DIASTOLIC BLOOD PRESSURE: 62 MMHG | WEIGHT: 80 LBS | TEMPERATURE: 98 F | HEIGHT: 60 IN | HEART RATE: 66 BPM | SYSTOLIC BLOOD PRESSURE: 126 MMHG | RESPIRATION RATE: 18 BRPM

## 2018-10-25 DIAGNOSIS — R10.9 ABDOMINAL DISCOMFORT: Primary | ICD-10-CM

## 2018-10-25 DIAGNOSIS — R63.0 DECREASE IN APPETITE: ICD-10-CM

## 2018-10-25 DIAGNOSIS — G47.00 INSOMNIA, UNSPECIFIED TYPE: ICD-10-CM

## 2018-10-25 DIAGNOSIS — N18.30 CKD (CHRONIC KIDNEY DISEASE) STAGE 3, GFR 30-59 ML/MIN: ICD-10-CM

## 2018-10-25 PROCEDURE — 3074F SYST BP LT 130 MM HG: CPT | Mod: CPTII,,, | Performed by: FAMILY MEDICINE

## 2018-10-25 PROCEDURE — 99999 PR PBB SHADOW E&M-EST. PATIENT-LVL IV: CPT | Mod: PBBFAC,,, | Performed by: FAMILY MEDICINE

## 2018-10-25 PROCEDURE — 3078F DIAST BP <80 MM HG: CPT | Mod: CPTII,,, | Performed by: FAMILY MEDICINE

## 2018-10-25 PROCEDURE — 1101F PT FALLS ASSESS-DOCD LE1/YR: CPT | Mod: CPTII,,, | Performed by: FAMILY MEDICINE

## 2018-10-25 PROCEDURE — 99214 OFFICE O/P EST MOD 30 MIN: CPT | Mod: S$PBB,,, | Performed by: FAMILY MEDICINE

## 2018-10-25 PROCEDURE — 99214 OFFICE O/P EST MOD 30 MIN: CPT | Mod: PBBFAC,PO | Performed by: FAMILY MEDICINE

## 2018-10-25 RX ORDER — PANTOPRAZOLE SODIUM 20 MG/1
20 TABLET, DELAYED RELEASE ORAL DAILY
Qty: 90 TABLET | Refills: 1 | Status: SHIPPED | OUTPATIENT
Start: 2018-10-25 | End: 2019-01-10 | Stop reason: CLARIF

## 2018-10-25 RX ORDER — TEMAZEPAM 7.5 MG/1
CAPSULE ORAL
Qty: 60 CAPSULE | Refills: 2 | Status: SHIPPED | OUTPATIENT
Start: 2018-10-25 | End: 2019-01-21 | Stop reason: SDUPTHER

## 2018-10-25 NOTE — PROGRESS NOTES
Chief Complaint   Patient presents with    Abdominal Pain    Fatigue    Food Intolerance       HPI  Ariadna Villegas is a 73 y.o. female with multiple medical diagnoses as listed in the medical history and problem list that presents for evaluation for one week of upset stomach and losing her taste for food. No vomiting. No nausea. No diarrhea. She has had more belching. She has not been eating much and not drinking as much as normal. She reports a 4 lb weight loss. She has not tried anything over the counter. She has a BM every morning. Yesterday she has been eating soup and prior to that a half of a slice of pizza.    PAST MEDICAL HISTORY:  Past Medical History:   Diagnosis Date    Arthritis     neck and back    COPD (chronic obstructive pulmonary disease)     Malignant hypertension     Osteoporosis, postmenopausal        PAST SURGICAL HISTORY:  Past Surgical History:   Procedure Laterality Date     x3      HYSTERECTOMY      PARTIAL HYSTERECTOMY      , after her third     SALPINGOOPHORECTOMY      , for a ovarian cyst       SOCIAL HISTORY:  Social History     Socioeconomic History    Marital status:      Spouse name: Not on file    Number of children: Not on file    Years of education: Not on file    Highest education level: Not on file   Social Needs    Financial resource strain: Not on file    Food insecurity - worry: Not on file    Food insecurity - inability: Not on file    Transportation needs - medical: Not on file    Transportation needs - non-medical: Not on file   Occupational History    Occupation: Retired   Tobacco Use    Smoking status: Former Smoker     Packs/day: 0.15     Years: 60.00     Pack years: 9.00     Types: Cigarettes    Smokeless tobacco: Never Used    Tobacco comment: 1-3 cigarettes a day   Substance and Sexual Activity    Alcohol use: Yes     Comment: socially    Drug use: No    Sexual activity: No     Partners: Male     Comment:     Other Topics Concern    Not on file   Social History Narrative    Not on file       FAMILY HISTORY:  Family History   Problem Relation Age of Onset    Heart disease Mother     Diabetes Mother     Stroke Father     Heart disease Sister        ALLERGIES AND MEDICATIONS: updated and reviewed.  Review of patient's allergies indicates:   Allergen Reactions    Lunesta [eszopiclone] Other (See Comments)     Sleep walking     Current Outpatient Medications   Medication Sig Dispense Refill    amLODIPine (NORVASC) 10 MG tablet TAKE 1 TABLET(10 MG) BY MOUTH EVERY DAY 90 tablet 1    aspirin (ECOTRIN) 81 MG EC tablet Take 1 tablet (81 mg total) by mouth once daily.      clotrimazole (LOTRIMIN) 1 % cream Apply topically 2 (two) times daily. 28 g 0    losartan-hydrochlorothiazide 100-25 mg (HYZAAR) 100-25 mg per tablet Take 1 tablet by mouth once daily. 90 tablet 1    metoprolol succinate (TOPROL-XL) 50 MG 24 hr tablet TAKE 3 TABLETS(150 MG) BY MOUTH EVERY EVENING (Patient taking differently: TAKE 3 TABLETS(150 MG) BY MOUTH EVERY EVENING) 270 tablet 0    nicotine (NICODERM CQ) 21 mg/24 hr Place 1 patch onto the skin once daily. 30 patch 1    oxycodone (ROXICODONE) 5 MG immediate release tablet Take 5 mg by mouth every 6 (six) hours as needed.   0    rosuvastatin (CRESTOR) 20 MG tablet Take 1 tablet (20 mg total) by mouth once daily. 30 tablet 11    temazepam (RESTORIL) 7.5 MG Cap TAKE 2 CAPSULES(15 MG) BY MOUTH EVERY EVENING 60 capsule 2    tiZANidine (ZANAFLEX) 4 MG tablet Take 4 mg by mouth nightly as needed.      pantoprazole (PROTONIX) 20 MG tablet Take 1 tablet (20 mg total) by mouth once daily. 90 tablet 1    sertraline (ZOLOFT) 50 MG tablet TAKE 1 TABLET(50 MG) BY MOUTH EVERY DAY 90 tablet 1    triamcinolone acetonide 0.5% (KENALOG) 0.5 % Crea Apply topically 2 (two) times daily. 15 g 0     Current Facility-Administered Medications   Medication Dose Route Frequency Provider Last Rate Last Dose     cyanocobalamin injection 100 mcg  100 mcg Intramuscular Q30 Days MENDEL Wolf MD   100 mcg at 05/08/17 1509       ROS  Review of Systems   Constitutional: Negative for chills, diaphoresis, fatigue, fever and unexpected weight change.   HENT: Negative for rhinorrhea, sinus pressure, sore throat and tinnitus.    Eyes: Negative for photophobia and visual disturbance.   Respiratory: Negative for cough, shortness of breath and wheezing.    Cardiovascular: Negative for chest pain and palpitations.   Gastrointestinal: Positive for abdominal pain. Negative for blood in stool, constipation, diarrhea, nausea and vomiting.   Genitourinary: Negative for dysuria, flank pain, frequency and vaginal discharge.   Musculoskeletal: Negative for arthralgias and joint swelling.   Skin: Negative for rash.   Neurological: Negative for speech difficulty, weakness, light-headedness and headaches.   Psychiatric/Behavioral: Negative for behavioral problems and dysphoric mood.       Physical Exam  Vitals:    10/25/18 1453   BP: 126/62   Pulse: 66   Resp: 18   Temp: 98 °F (36.7 °C)   TempSrc: Oral   SpO2: 98%   Weight: 36.3 kg (80 lb)   Height: 5' (1.524 m)    Body mass index is 15.62 kg/m².  Weight: 36.3 kg (80 lb)   Height: 5' (152.4 cm)     Physical Exam   Constitutional: She is oriented to person, place, and time. She appears well-developed and well-nourished. No distress.   Eyes: EOM are normal.   Neck: Neck supple.   Cardiovascular: Normal rate and regular rhythm. Exam reveals no gallop and no friction rub.   No murmur heard.  Pulmonary/Chest: Effort normal and breath sounds normal. No respiratory distress. She has no wheezes. She has no rales.   Abdominal: Soft. Bowel sounds are normal. She exhibits no distension and no mass. There is no tenderness. There is no rebound and no guarding. No hernia.   Lymphadenopathy:     She has no cervical adenopathy.   Neurological: She is alert and oriented to person, place, and time.    Skin: Skin is warm and dry. No rash noted.   Psychiatric: She has a normal mood and affect. Her behavior is normal.   Nursing note and vitals reviewed.      Health Maintenance       Date Due Completion Date    Colonoscopy 01/01/2018 1/1/2008 (Done)    Override on 1/1/2008: Done    Override on 5/14/2006: Done    Lipid Panel 07/26/2019 7/26/2018    High Dose Statin 09/17/2019 9/17/2018    Mammogram 04/06/2020 4/6/2018    Override on 3/28/2017: Declined    Override on 3/25/2014: Declined (not at this present time)    Override on 5/14/2012: Done    DEXA SCAN 07/26/2021 7/26/2018    Override on 3/25/2014: Declined (not at this present time)    Override on 6/12/2012: Done    TETANUS VACCINE 08/24/2026 8/24/2016 (Declined)    Override on 8/24/2016: Declined            ASSESSMENT     1. Abdominal discomfort    2. Insomnia, unspecified type    3. Decrease in appetite    4. CKD (chronic kidney disease) stage 3, GFR 30-59 ml/min        PLAN:     Problem List Items Addressed This Visit        Renal/    CKD (chronic kidney disease) stage 3, GFR 30-59 ml/min  Lab Results   Component Value Date    CREATININE 1.5 (H) 07/26/2018     Continue to montir       Other    Insomnia   -stable on current dose    Relevant Medications    temazepam (RESTORIL) 7.5 MG Cap      Other Visit Diagnoses     Abdominal discomfort    -  Primary  -overdue for c-scope, consult GI as pain is in lower abdomen with fullness and she has lost 8 lbs rapidly  -concerned for malignancy    Relevant Medications    pantoprazole (PROTONIX) 20 MG tablet    Other Relevant Orders    Ambulatory consult to Gastroenterology    Decrease in appetite      -consult GI    Relevant Orders    Ambulatory consult to Gastroenterology            Kirti Morris MD  10/26/2018 3:05 PM        Follow-up in about 6 weeks (around 12/6/2018).

## 2018-10-30 ENCOUNTER — TELEPHONE (OUTPATIENT)
Dept: FAMILY MEDICINE | Facility: CLINIC | Age: 73
End: 2018-10-30

## 2018-10-30 NOTE — TELEPHONE ENCOUNTER
Spoke with Dr. Moreno regarding her recent OV, EGD, and c-scope. These have been normal, however suspicion for malignancy in liver or lung. We discussed the need for CT chest, he will order this along with abdomen and pelvis, however she will not be able to have IV contrast due to renal function. Continue to follow.

## 2018-10-31 ENCOUNTER — TELEPHONE (OUTPATIENT)
Dept: ADMINISTRATIVE | Facility: HOSPITAL | Age: 73
End: 2018-10-31

## 2018-11-07 ENCOUNTER — OFFICE VISIT (OUTPATIENT)
Dept: PULMONOLOGY | Facility: CLINIC | Age: 73
End: 2018-11-07
Payer: MEDICARE

## 2018-11-07 VITALS
HEART RATE: 71 BPM | SYSTOLIC BLOOD PRESSURE: 112 MMHG | HEIGHT: 60 IN | OXYGEN SATURATION: 98 % | DIASTOLIC BLOOD PRESSURE: 58 MMHG | BODY MASS INDEX: 16.66 KG/M2 | WEIGHT: 84.88 LBS

## 2018-11-07 DIAGNOSIS — J43.2 CENTRILOBULAR EMPHYSEMA: Chronic | ICD-10-CM

## 2018-11-07 DIAGNOSIS — R91.1 PULMONARY NODULE: ICD-10-CM

## 2018-11-07 DIAGNOSIS — Z72.0 TOBACCO ABUSE: Primary | ICD-10-CM

## 2018-11-07 PROCEDURE — 3074F SYST BP LT 130 MM HG: CPT | Mod: CPTII,S$GLB,, | Performed by: INTERNAL MEDICINE

## 2018-11-07 PROCEDURE — 1101F PT FALLS ASSESS-DOCD LE1/YR: CPT | Mod: CPTII,S$GLB,, | Performed by: INTERNAL MEDICINE

## 2018-11-07 PROCEDURE — 3078F DIAST BP <80 MM HG: CPT | Mod: CPTII,S$GLB,, | Performed by: INTERNAL MEDICINE

## 2018-11-07 PROCEDURE — 99999 PR PBB SHADOW E&M-EST. PATIENT-LVL III: CPT | Mod: PBBFAC,,, | Performed by: INTERNAL MEDICINE

## 2018-11-07 PROCEDURE — 99215 OFFICE O/P EST HI 40 MIN: CPT | Mod: S$GLB,,, | Performed by: INTERNAL MEDICINE

## 2018-11-07 NOTE — LETTER
November 7, 2018      Jair Smiley MD  [address]  [address]  [facility] [address]           [facility]  [address]  [place] [address]  Phone: [phone]          Patient: Ariadna Villegas   MR Number: [id number]   YOB: 1945   Date of Visit: 11/7/2018       Dear Dr. Jair Smiley:    Thank you for referring Ariadna Villegas to me for evaluation. Attached you will find relevant portions of my assessment and plan of care.    If you have questions, please do not hesitate to call me. I look forward to following Ariadna Villegas along with you.    Sincerely,    Eric Rose MD    Enclosure  CC:  No Recipients    If you would like to receive this communication electronically, please contact [email or web address] or [phone] to request more information on [employer] access.    For providers and/or their staff who would like to refer a patient to [facility], please contact us through our one-stop-shop provider referral line, [facility], at [phone].    If you feel you have received this communication in error or would no longer like to receive these types of communications, please e-mail [email or web address]

## 2018-11-07 NOTE — PROGRESS NOTES
Ariadna Villegas  was seen as a new patient to me for the evaluation of  copd.    CHIEF COMPLAINT:  Emphysema      HISTORY OF PRESENT ILLNESS: Ariadna Villegas is a 73 y.o. female  has a past medical history of Arthritis, COPD (chronic obstructive pulmonary disease), Malignant hypertension, Osteoporosis, postmenopausal, and Tobacco use disorder.  Patient was seen by Dr. Hager in the past for pulmonary nodule and copd.  During last encounter with Dr. Hager in 1/8/18.  At that time, patient was noted to have right lung nodule.  After discussing with IR, the decision is for serial CT.      Patient without new issue.  No fever/chills. No chest pain.  No hemoptysis.  No hutchinson with adl.      PAST MEDICAL HISTORY:    Active Ambulatory Problems     Diagnosis Date Noted    Renal artery stenosis 08/20/2012    DDD (degenerative disc disease), lumbar 10/30/2012    Cervicalgia 10/30/2012    Insomnia 10/30/2012    Mastoid pain 10/30/2012    Osteoporosis 11/05/2012    Hyperlipidemia 11/05/2012    Thoracolumbar back pain 11/16/2012    Generalized osteoarthrosis, unspecified site 05/21/2012    BPV (benign positional vertigo) 02/18/2014    Hypovitaminosis D 02/27/2014    CKD (chronic kidney disease) stage 3, GFR 30-59 ml/min 11/11/2014    Chest pain 01/12/2015    Essential hypertension 01/12/2015    COPD (chronic obstructive pulmonary disease) 01/12/2015    Fatigue 05/12/2015    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    B12 deficiency 08/30/2016    Pulmonary nodule 12/08/2017    Centrilobular emphysema     Tobacco abuse 11/07/2018     Resolved Ambulatory Problems     Diagnosis Date Noted    Tobacco use disorder     Abnormal urine 11/16/2012    Elevated transaminase measurement 11/16/2012    Elevated alkaline phosphatase level 11/16/2012    CKD (chronic kidney disease) stage 3, GFR 30-59 ml/min 11/16/2012     Past Medical History:   Diagnosis Date    Arthritis     COPD (chronic obstructive  pulmonary disease)     Malignant hypertension     Osteoporosis, postmenopausal     Tobacco use disorder                 PAST SURGICAL HISTORY:    Past Surgical History:   Procedure Laterality Date     x3      HYSTERECTOMY      PARTIAL HYSTERECTOMY      , after her third     SALPINGOOPHORECTOMY      , for a ovarian cyst         FAMILY HISTORY:                Family History   Problem Relation Age of Onset    Heart disease Mother     Diabetes Mother     Stroke Father     Heart disease Sister        SOCIAL HISTORY:          Tobacco:   Social History     Tobacco Use   Smoking Status Former Smoker    Packs/day: 0.15    Years: 60.00    Pack years: 9.00    Types: Cigarettes   Smokeless Tobacco Never Used   Tobacco Comment    1-3 cigarettes a day     alcohol use:    Social History     Substance and Sexual Activity   Alcohol Use Yes    Comment: socially               Occupation:  Former , house cleaning.    ALLERGIES:    Review of patient's allergies indicates:   Allergen Reactions    Lunesta [eszopiclone] Other (See Comments)     Sleep walking       CURRENT MEDICATIONS:    Current Outpatient Medications   Medication Sig Dispense Refill    amLODIPine (NORVASC) 10 MG tablet TAKE 1 TABLET(10 MG) BY MOUTH EVERY DAY 90 tablet 1    aspirin (ECOTRIN) 81 MG EC tablet Take 1 tablet (81 mg total) by mouth once daily.      clotrimazole (LOTRIMIN) 1 % cream Apply topically 2 (two) times daily. 28 g 0    losartan-hydrochlorothiazide 100-25 mg (HYZAAR) 100-25 mg per tablet Take 1 tablet by mouth once daily. 90 tablet 1    metoprolol succinate (TOPROL-XL) 50 MG 24 hr tablet TAKE 3 TABLETS(150 MG) BY MOUTH EVERY EVENING (Patient taking differently: TAKE 3 TABLETS(150 MG) BY MOUTH EVERY EVENING) 270 tablet 0    nicotine (NICODERM CQ) 21 mg/24 hr Place 1 patch onto the skin once daily. 30 patch 1    oxycodone (ROXICODONE) 5 MG immediate release tablet Take 5 mg by mouth every 6 (six)  hours as needed.   0    pantoprazole (PROTONIX) 20 MG tablet Take 1 tablet (20 mg total) by mouth once daily. 90 tablet 1    rosuvastatin (CRESTOR) 20 MG tablet Take 1 tablet (20 mg total) by mouth once daily. 30 tablet 11    sertraline (ZOLOFT) 50 MG tablet TAKE 1 TABLET(50 MG) BY MOUTH EVERY DAY 90 tablet 1    temazepam (RESTORIL) 7.5 MG Cap TAKE 2 CAPSULES(15 MG) BY MOUTH EVERY EVENING 60 capsule 2    tiZANidine (ZANAFLEX) 4 MG tablet Take 4 mg by mouth nightly as needed.      triamcinolone acetonide 0.5% (KENALOG) 0.5 % Crea Apply topically 2 (two) times daily. 15 g 0     Current Facility-Administered Medications   Medication Dose Route Frequency Provider Last Rate Last Dose    cyanocobalamin injection 100 mcg  100 mcg Intramuscular Q30 Days MENDEL Wolf MD   100 mcg at 05/08/17 1509                  REVIEW OF SYSTEMS:     Pulmonary related symptoms as per HPI.  Gen:  no weight loss, no fever, no night sweat  HEENT:  no visual changes, no sore throat, no hearing loss  CV:  No chest pain, no orthopnea, no PND  GI:  no melena, no hematochezia, no diarhea, no constipation.  :  no dysuria, no hematuria, no hesistancy, no dribbling  Neuro:  no syncope, + vertigo, no tinitus  Psych:  No homocide or suicide ideation; no depression.  Endocrine:  No heat or cold intolerance.  Sleep:  No snoring; no witnessed apnea.  Otherwise, a balance of systems reviewed is negative.          PHYSICAL EXAM:  Vitals:    11/07/18 1307   BP: (!) 112/58   Pulse: 71   SpO2: 98%   Weight: 38.5 kg (84 lb 14 oz)   Height: 5' (1.524 m)     Body mass index is 16.58 kg/m².     GENERAL:  well develop; no apparent distress  HEENT:  no nasal congestion; no discharge noted; class 3 modified mallampatti.   NECK:  supple; no palpable masses.  CARDIO: regular rate and rhythm  PULM:  clear to auscultation bilaterally; no intercostals retractions; no accessory muscle usage   ABDOMEN:  soft nontender/nondistended.  +bowel  sound  EXTREMITIES no cce  NEURO:  CN II-XII intact.  5/5 motor in all extremities.  sensation grossly intact   to light touch.  PSYCH:  normal affect.  Alert and oriented x 4    LABS  Pulmonary Functions Testing Results: 1/8/18 ratio 62%; fvc 97%; fev1 78%; dlco 41%  ABG none  CXR:  11/12/17 no effusion or consolidation  CT CHEST:  9/7/18 enlarging rul nodule    ASSESSMENT/PLAN  Problem List Items Addressed This Visit     COPD (chronic obstructive pulmonary disease) (Chronic)    Overview     Mild per pft.  +emphysematous changes on ct.         Current Assessment & Plan       Clinically asymptomatic.  Encourage smoking cessation.  Will refer to smoking cessation.           Pulmonary nodule    Overview     rul nodule noted since 12/2017.  Gradually enlarged with ct 9/18.           Current Assessment & Plan     Will consult IR for ct guided biopsy.           Relevant Orders    Amb Consult to [facility] Interventional RAD    IR Biopsy Lung    Tobacco abuse - Primary    Current Assessment & Plan     Smoking cessation         Relevant Orders    Ambulatory referral to Smoking Cessation Program            Patient will No Follow-up on file. with md.    CC: Send copy of this note to Jair Smiley MD

## 2018-11-09 ENCOUNTER — TELEPHONE (OUTPATIENT)
Dept: SLEEP MEDICINE | Facility: OTHER | Age: 73
End: 2018-11-09

## 2018-11-09 NOTE — TELEPHONE ENCOUNTER
Spoken with daughter over the phone.  Patient was approved for ct guided biopsy.  Aware of higher risk for pneumo due to emphysematous changes.  At this point, options include ct guided biopsy vs vats with resection vs no intervention (if patient is not interested in treatment if patient does indeed have malagnancy).  Daughter will get back with me.

## 2018-11-12 ENCOUNTER — TELEPHONE (OUTPATIENT)
Dept: PULMONOLOGY | Facility: CLINIC | Age: 73
End: 2018-11-12

## 2018-11-12 NOTE — TELEPHONE ENCOUNTER
Spoke with patient's daughter Cici Malin [phone]. She stated as per your conversation last Friday she an her mother discussed her options pertanining to Bx. Patient would like to move forward and asked if Dr Rose would set it up. Please advise thanks. Ai PARR MA

## 2018-11-12 NOTE — TELEPHONE ENCOUNTER
----- Message from Georgiana Uriarte sent at 11/12/2018  9:44 AM CST -----  Contact: Cici newman /Daughter [phone]  ..Needs Advice    Reason for call:        Communication Preference:phone    Additional Information:pt daughter states she needs to speak with  states she spoke with him about her mother and would like to speak with him again please call back to discuss

## 2018-11-14 DIAGNOSIS — R91.1 LUNG NODULE: Primary | ICD-10-CM

## 2018-11-26 DIAGNOSIS — I13.11 HYPERTENSIVE HEART AND CHRONIC KIDNEY DISEASE WITHOUT HEART FAILURE, WITH STAGE 5 CHRONIC KIDNEY DISEASE, OR END STAGE RENAL DISEASE: ICD-10-CM

## 2018-11-26 DIAGNOSIS — Z01.818 PREOPERATIVE EVALUATION TO RULE OUT SURGICAL CONTRAINDICATION: ICD-10-CM

## 2018-11-26 DIAGNOSIS — R91.1 PULMONARY NODULE: Primary | ICD-10-CM

## 2018-11-26 DIAGNOSIS — Z01.818 PRE-OP TESTING: ICD-10-CM

## 2018-11-26 DIAGNOSIS — N18.9 CHRONIC KIDNEY DISEASE, UNSPECIFIED CKD STAGE: ICD-10-CM

## 2018-11-26 DIAGNOSIS — Z01.818 PREOP EXAM FOR INTERNAL MEDICINE: ICD-10-CM

## 2018-11-26 DIAGNOSIS — R91.8 LUNG MASS: ICD-10-CM

## 2018-11-26 RX ORDER — MIDAZOLAM HYDROCHLORIDE 1 MG/ML
1 INJECTION INTRAMUSCULAR; INTRAVENOUS
Status: CANCELLED | OUTPATIENT
Start: 2018-11-26

## 2018-11-26 RX ORDER — FENTANYL CITRATE 50 UG/ML
50 INJECTION, SOLUTION INTRAMUSCULAR; INTRAVENOUS
Status: CANCELLED | OUTPATIENT
Start: 2018-11-26

## 2018-11-27 ENCOUNTER — HOSPITAL ENCOUNTER (OUTPATIENT)
Facility: HOSPITAL | Age: 73
Discharge: HOME | End: 2018-11-27
Attending: INTERNAL MEDICINE | Admitting: INTERNAL MEDICINE
Payer: MEDICARE

## 2018-11-27 ENCOUNTER — TELEPHONE (OUTPATIENT)
Dept: PULMONOLOGY | Facility: CLINIC | Age: 73
End: 2018-11-27

## 2018-11-27 VITALS
HEART RATE: 65 BPM | RESPIRATION RATE: 16 BRPM | OXYGEN SATURATION: 95 % | DIASTOLIC BLOOD PRESSURE: 46 MMHG | TEMPERATURE: 98 F | SYSTOLIC BLOOD PRESSURE: 107 MMHG | WEIGHT: 80 LBS | HEIGHT: 60 IN | BODY MASS INDEX: 15.71 KG/M2

## 2018-11-27 DIAGNOSIS — R91.8 LUNG MASS: ICD-10-CM

## 2018-11-27 DIAGNOSIS — R16.0 LIVER MASS: ICD-10-CM

## 2018-11-27 DIAGNOSIS — R91.1 PULMONARY NODULE: ICD-10-CM

## 2018-11-27 PROCEDURE — 63600175 PHARM REV CODE 636 W HCPCS: Performed by: RADIOLOGY

## 2018-11-27 PROCEDURE — 88305 TISSUE EXAM BY PATHOLOGIST: CPT | Mod: 26,,, | Performed by: PATHOLOGY

## 2018-11-27 PROCEDURE — 88342 IMHCHEM/IMCYTCHM 1ST ANTB: CPT | Mod: 59 | Performed by: PATHOLOGY

## 2018-11-27 PROCEDURE — 25000003 PHARM REV CODE 250: Performed by: INTERNAL MEDICINE

## 2018-11-27 PROCEDURE — 88333 PATH CONSLTJ SURG CYTO XM 1: CPT | Mod: 26,,, | Performed by: PATHOLOGY

## 2018-11-27 PROCEDURE — 88305 TISSUE EXAM BY PATHOLOGIST: CPT | Performed by: PATHOLOGY

## 2018-11-27 PROCEDURE — 88342 IMHCHEM/IMCYTCHM 1ST ANTB: CPT | Mod: 26,,, | Performed by: PATHOLOGY

## 2018-11-27 PROCEDURE — 88341 IMHCHEM/IMCYTCHM EA ADD ANTB: CPT | Mod: 26,,, | Performed by: PATHOLOGY

## 2018-11-27 PROCEDURE — 25000003 PHARM REV CODE 250: Performed by: NURSE PRACTITIONER

## 2018-11-27 RX ORDER — SODIUM CHLORIDE 9 MG/ML
500 INJECTION, SOLUTION INTRAVENOUS ONCE
Status: COMPLETED | OUTPATIENT
Start: 2018-11-27 | End: 2018-11-27

## 2018-11-27 RX ORDER — LIDOCAINE HYDROCHLORIDE 10 MG/ML
1 INJECTION, SOLUTION EPIDURAL; INFILTRATION; INTRACAUDAL; PERINEURAL ONCE
Status: COMPLETED | OUTPATIENT
Start: 2018-11-27 | End: 2018-11-27

## 2018-11-27 RX ORDER — FENTANYL CITRATE 50 UG/ML
INJECTION, SOLUTION INTRAMUSCULAR; INTRAVENOUS CODE/TRAUMA/SEDATION MEDICATION
Status: COMPLETED | OUTPATIENT
Start: 2018-11-27 | End: 2018-11-27

## 2018-11-27 RX ORDER — MIDAZOLAM HYDROCHLORIDE 1 MG/ML
INJECTION INTRAMUSCULAR; INTRAVENOUS CODE/TRAUMA/SEDATION MEDICATION
Status: COMPLETED | OUTPATIENT
Start: 2018-11-27 | End: 2018-11-27

## 2018-11-27 RX ADMIN — MIDAZOLAM HYDROCHLORIDE 1 MG: 1 INJECTION, SOLUTION INTRAMUSCULAR; INTRAVENOUS at 10:11

## 2018-11-27 RX ADMIN — SODIUM CHLORIDE 500 ML: 0.9 INJECTION, SOLUTION INTRAVENOUS at 08:11

## 2018-11-27 RX ADMIN — FENTANYL CITRATE 50 MCG: 50 INJECTION, SOLUTION INTRAMUSCULAR; INTRAVENOUS at 10:11

## 2018-11-27 RX ADMIN — LIDOCAINE HYDROCHLORIDE 10 MG: 10 INJECTION, SOLUTION EPIDURAL; INFILTRATION; INTRACAUDAL; PERINEURAL at 08:11

## 2018-11-27 NOTE — TELEPHONE ENCOUNTER
----- Message from Cain Renteria sent at 11/27/2018  4:54 PM CST -----  Contact: Cici(daughter) [phone]  The patient's daughter is requesting a call back from Ms. Shannon. She is calling on behalf of the patient, requesting that the patient's appointment be moved up. Please call at your earliest convenience.

## 2018-11-27 NOTE — NURSING
Discharge instructions given. Family at bedside. Verbalized understand. PIV removed. Catheter intact, site without swelling. Prescription given, instructed new medication, verbalized understanding. Instructed on incision site care and symptoms that require MD attention.  Transferred off floor via wheelchair.

## 2018-11-27 NOTE — H&P
Inpatient Radiology Pre-procedure Note    History of Present Illness:  Ariadna Villegas is a 73 y.o. female who presents for lung biopsy.  Admission H&P reviewed.  Past Medical History:   Diagnosis Date    Arthritis     neck and back    COPD (chronic obstructive pulmonary disease)     Malignant hypertension     Osteoporosis, postmenopausal     Tobacco use disorder      Past Surgical History:   Procedure Laterality Date     x3      HYSTERECTOMY      PARTIAL HYSTERECTOMY      , after her third     SALPINGOOPHORECTOMY      , for a ovarian cyst       Review of Systems:   As documented in primary team H&P    Home Meds:   Prior to Admission medications    Medication Sig Start Date End Date Taking? Authorizing Provider   amLODIPine (NORVASC) 10 MG tablet TAKE 1 TABLET(10 MG) BY MOUTH EVERY DAY 18  Yes Kirti Morris MD   aspirin (ECOTRIN) 81 MG EC tablet Take 1 tablet (81 mg total) by mouth once daily. 16  Yes Jair Smiley MD   metoprolol succinate (TOPROL-XL) 50 MG 24 hr tablet TAKE 3 TABLETS(150 MG) BY MOUTH EVERY EVENING  Patient taking differently: TAKE 3 TABLETS(150 MG) BY MOUTH EVERY EVENING 18  Yes Kirti Morris MD   oxycodone (ROXICODONE) 5 MG immediate release tablet Take 5 mg by mouth every 6 (six) hours as needed.  16  Yes Historical Provider, MD   pantoprazole (PROTONIX) 20 MG tablet Take 1 tablet (20 mg total) by mouth once daily. 10/25/18 10/25/19 Yes Kirti Morris MD   rosuvastatin (CRESTOR) 20 MG tablet Take 1 tablet (20 mg total) by mouth once daily. 18  Yes Jair Smiley MD   temazepam (RESTORIL) 7.5 MG Cap TAKE 2 CAPSULES(15 MG) BY MOUTH EVERY EVENING 10/25/18  Yes Kirti Morris MD   tiZANidine (ZANAFLEX) 4 MG tablet Take 4 mg by mouth nightly as needed. 1/15/18  Yes Historical Provider, MD   clotrimazole (LOTRIMIN) 1 % cream Apply topically 2 (two) times daily. 18   Kirti Morris MD   losartan-hydrochlorothiazide 100-25 mg  (HYZAAR) 100-25 mg per tablet Take 1 tablet by mouth once daily. 5/8/18   Kirti Morris MD   nicotine (NICODERM CQ) 21 mg/24 hr Place 1 patch onto the skin once daily. 12/26/17   Ekaterina Crouch MD   sertraline (ZOLOFT) 50 MG tablet TAKE 1 TABLET(50 MG) BY MOUTH EVERY DAY 7/26/18   Kirti Morris MD   triamcinolone acetonide 0.5% (KENALOG) 0.5 % Crea Apply topically 2 (two) times daily. 1/29/18 7/26/18  Mckenzie Nair NP     Scheduled Meds:    sodium chloride 0.9%  500 mL Intravenous Once    lidocaine (PF) 10 mg/ml (1%)  1 mL Intradermal Once     Continuous Infusions:   PRN Meds:  Anticoagulants/Antiplatelets: aspirin, has been off aspirin for at least 1 week.    Allergies:   Review of patient's allergies indicates:   Allergen Reactions    Lunesta [eszopiclone] Other (See Comments)     Sleep walking     Sedation Hx: have not been any systemic reactions    Labs:  Recent Labs   Lab 11/27/18  0714   INR 0.9       Recent Labs   Lab 11/27/18  0714   WBC 9.22   HGB 12.2   HCT 38.4   MCV 92       No results for input(s): GLU, NA, K, CL, CO2, BUN, CREATININE, CALCIUM, MG, ALT, AST, ALBUMIN, BILITOT, BILIDIR in the last 168 hours.      Vitals:        Physical Exam:  ASA: II  Mallampati: III    General: no acute distress  Mental Status: alert and oriented to person, place and time  HEENT: normocephalic, atraumatic  Chest: unlabored breathing  Heart: regular heart rate  Abdomen: nondistended  Extremity: moves all extremities    Plan: Lung biopsy  Sedation Plan: Louis Alanis M.D.

## 2018-11-27 NOTE — DISCHARGE SUMMARY
Radiology Discharge Summary      Hospital Course: No complications    Admit Date: 11/27/2018  Discharge Date: 11/27/2018     Instructions Given to Patient: Yes  Diet: Resume prior diet  Activity: activity as tolerated    Description of Condition on Discharge: Stable  Vital Signs (Most Recent): Temp: 97.5 °F (36.4 °C) (11/27/18 1100)  Pulse: 65 (11/27/18 1315)  Resp: 16 (11/27/18 1315)  BP: (!) 107/46 (11/27/18 1315)  SpO2: 95 % (11/27/18 1315)    Discharge Disposition: Home    Discharge Diagnosis: Lung nodule post biopsy     Follow-up: Per PCP    Wang Alanis M.D.  PGY-2  Radiology

## 2018-11-27 NOTE — PROGRESS NOTES
Pt arrived to [facility] bed 2 for post ling biopsy recovery. Report received from LAURA Martinez. Pt denies pain/discomfort. Dressing CDI. VSS. No acute events. See flow sheets for post procedure monitoring.

## 2018-11-27 NOTE — DISCHARGE SUMMARY
Radiology Discharge Summary      Hospital Course: No complications    Admit Date: 11/27/2018  Discharge Date: 11/27/2018     Instructions Given to Patient: Yes  Diet: Resume prior diet  Activity: activity as tolerated and no driving for today    Description of Condition on Discharge: Stable  Vital Signs (Most Recent): Temp: 97.5 °F (36.4 °C) (11/27/18 1100)  Pulse: (!) 58 (11/27/18 1215)  Resp: 16 (11/27/18 1215)  BP: (!) 132/55 (11/27/18 1215)  SpO2: 98 % (11/27/18 1215)    Discharge Disposition: Home    Discharge Diagnosis: Right pulmonary nodule s/p biopsy. Follow up as scheduled.    Chucky Combs M.D.  Diagnostic and Interventional Radiologist  Department of Radiology  Pager: [phone]

## 2018-11-27 NOTE — TELEPHONE ENCOUNTER
----- Message from Eric Rose MD sent at 11/27/2018  4:15 PM CST -----  Please schedule clinic appointment with me in 1-2 weeks to go over biopsy result.  Ok to overbook.

## 2018-11-27 NOTE — DISCHARGE INSTRUCTIONS
For scheduling: Call Clarissa at [phone]    For questions or concerns call: MOLLY [facility] 8 AM- 5PM [phone]. Radiology resident on call [phone].    For immediate concerns that are not emergent, you may call our radiology clinic at: [phone]

## 2018-11-27 NOTE — PROCEDURES
Radiology Post-Procedure Note    Pre Op Diagnosis: Right lung mass    Post Op Diagnosis: Right lung mass    Procedure: right lung biopsy    Procedure performed by: Chucky Combs MD    Written Informed Consent Obtained: Yes    Specimen Removed: YES     Estimated Blood Loss: Minimal    Findings:   Using CT- guidance a 19g sheath needle was placed into a Right lung mass. 20g monopty biopsy gun used to take 6 core biopsy samples. Specimen sent to pathology.     Additional specimen sent to lab: Yes    Patient tolerated procedure well.    Wang Alanis M.D.  PGY-2  Radiology

## 2018-12-03 ENCOUNTER — TELEPHONE (OUTPATIENT)
Dept: PULMONOLOGY | Facility: CLINIC | Age: 73
End: 2018-12-03

## 2018-12-03 NOTE — TELEPHONE ENCOUNTER
Advised patient's daughter that biopsy is still pending and once the biopsy is in the Dr will release results.

## 2018-12-03 NOTE — TELEPHONE ENCOUNTER
----- Message from Simona Rosa sent at 12/3/2018  1:38 PM CST -----  Contact: Daughter:   Cici     tel:  [phone] cell  Pt. Had a lung biopsy and they are looking for the results of this.    Did you review  The results.   They have not been sent to the pt. Portal yet .

## 2018-12-07 ENCOUNTER — TELEPHONE (OUTPATIENT)
Dept: PULMONOLOGY | Facility: CLINIC | Age: 73
End: 2018-12-07

## 2018-12-07 DIAGNOSIS — C34.90 NON-SMALL CELL LUNG CANCER, UNSPECIFIED LATERALITY: ICD-10-CM

## 2018-12-07 DIAGNOSIS — C34.11 MALIGNANT NEOPLASM OF UPPER LOBE OF RIGHT LUNG: Primary | ICD-10-CM

## 2018-12-07 NOTE — TELEPHONE ENCOUNTER
Result of lung biopsy d/w patient over the phone.  Aware of diagnosis of malignancy.  Will need pet to help with staging.  May be candidate for resection pending pet result.  Please refer to oncology.

## 2018-12-07 NOTE — TELEPHONE ENCOUNTER
----- Message from Shannon Alexander MA sent at 12/7/2018  3:06 PM CST -----  Contact: daughter      ----- Message -----  From: Tiffani Sahu  Sent: 12/7/2018   3:02 PM  To: Rose Reyes V Staff    Pt daughter called again very frustrated. She would like to know the results of pt biopsy. She said she does not want to wait until appt on Tuesday. Please call if able to give results before Tuesday to [phone]

## 2018-12-14 ENCOUNTER — LAB VISIT (OUTPATIENT)
Dept: LAB | Facility: HOSPITAL | Age: 73
End: 2018-12-14
Attending: INTERNAL MEDICINE
Payer: MEDICARE

## 2018-12-14 ENCOUNTER — INITIAL CONSULT (OUTPATIENT)
Dept: HEMATOLOGY/ONCOLOGY | Facility: CLINIC | Age: 73
End: 2018-12-14
Payer: MEDICARE

## 2018-12-14 VITALS
SYSTOLIC BLOOD PRESSURE: 127 MMHG | DIASTOLIC BLOOD PRESSURE: 60 MMHG | TEMPERATURE: 97 F | OXYGEN SATURATION: 96 % | HEART RATE: 75 BPM | HEIGHT: 60 IN | WEIGHT: 81.38 LBS | BODY MASS INDEX: 15.98 KG/M2

## 2018-12-14 DIAGNOSIS — G47.9 SLEEP DISTURBANCES: ICD-10-CM

## 2018-12-14 DIAGNOSIS — N18.30 CKD (CHRONIC KIDNEY DISEASE) STAGE 3, GFR 30-59 ML/MIN: ICD-10-CM

## 2018-12-14 DIAGNOSIS — C34.90 NON-SMALL CELL LUNG CANCER, UNSPECIFIED LATERALITY: Primary | ICD-10-CM

## 2018-12-14 DIAGNOSIS — R63.0 ANOREXIA: ICD-10-CM

## 2018-12-14 DIAGNOSIS — E87.6 HYPOKALEMIA: ICD-10-CM

## 2018-12-14 DIAGNOSIS — J44.9 CHRONIC OBSTRUCTIVE PULMONARY DISEASE, UNSPECIFIED COPD TYPE: Chronic | ICD-10-CM

## 2018-12-14 DIAGNOSIS — R11.0 NAUSEA: ICD-10-CM

## 2018-12-14 DIAGNOSIS — C34.90 NON-SMALL CELL LUNG CANCER, UNSPECIFIED LATERALITY: ICD-10-CM

## 2018-12-14 LAB
ALBUMIN SERPL BCP-MCNC: 3.8 G/DL
ALP SERPL-CCNC: 119 U/L
ALT SERPL W/O P-5'-P-CCNC: 5 U/L
ANION GAP SERPL CALC-SCNC: 10 MMOL/L
ANION GAP SERPL CALC-SCNC: 10 MMOL/L
AST SERPL-CCNC: 17 U/L
BASOPHILS # BLD AUTO: 0.01 K/UL
BASOPHILS NFR BLD: 0.1 %
BILIRUB SERPL-MCNC: 0.3 MG/DL
BUN SERPL-MCNC: 23 MG/DL
BUN SERPL-MCNC: 23 MG/DL
CALCIUM SERPL-MCNC: 9.7 MG/DL
CALCIUM SERPL-MCNC: 9.7 MG/DL
CHLORIDE SERPL-SCNC: 101 MMOL/L
CHLORIDE SERPL-SCNC: 101 MMOL/L
CO2 SERPL-SCNC: 23 MMOL/L
CO2 SERPL-SCNC: 23 MMOL/L
CREAT SERPL-MCNC: 1.7 MG/DL
CREAT SERPL-MCNC: 1.7 MG/DL
DIFFERENTIAL METHOD: ABNORMAL
EOSINOPHIL # BLD AUTO: 0.1 K/UL
EOSINOPHIL NFR BLD: 1.2 %
ERYTHROCYTE [DISTWIDTH] IN BLOOD BY AUTOMATED COUNT: 14.2 %
EST. GFR  (AFRICAN AMERICAN): 34 ML/MIN/1.73 M^2
EST. GFR  (AFRICAN AMERICAN): 34 ML/MIN/1.73 M^2
EST. GFR  (NON AFRICAN AMERICAN): 29 ML/MIN/1.73 M^2
EST. GFR  (NON AFRICAN AMERICAN): 29 ML/MIN/1.73 M^2
GLUCOSE SERPL-MCNC: 132 MG/DL
GLUCOSE SERPL-MCNC: 132 MG/DL
HCT VFR BLD AUTO: 35.6 %
HGB BLD-MCNC: 11.9 G/DL
LYMPHOCYTES # BLD AUTO: 2.3 K/UL
LYMPHOCYTES NFR BLD: 31 %
MCH RBC QN AUTO: 29.3 PG
MCHC RBC AUTO-ENTMCNC: 33.4 G/DL
MCV RBC AUTO: 88 FL
MONOCYTES # BLD AUTO: 0.6 K/UL
MONOCYTES NFR BLD: 8.6 %
NEUTROPHILS # BLD AUTO: 4.4 K/UL
NEUTROPHILS NFR BLD: 59.1 %
PLATELET # BLD AUTO: 285 K/UL
PMV BLD AUTO: 8.9 FL
POTASSIUM SERPL-SCNC: 4.8 MMOL/L
POTASSIUM SERPL-SCNC: 4.8 MMOL/L
PROT SERPL-MCNC: 7.3 G/DL
RBC # BLD AUTO: 4.06 M/UL
SODIUM SERPL-SCNC: 134 MMOL/L
SODIUM SERPL-SCNC: 134 MMOL/L
WBC # BLD AUTO: 7.48 K/UL

## 2018-12-14 PROCEDURE — 1101F PT FALLS ASSESS-DOCD LE1/YR: CPT | Mod: CPTII,S$GLB,, | Performed by: INTERNAL MEDICINE

## 2018-12-14 PROCEDURE — 99205 OFFICE O/P NEW HI 60 MIN: CPT | Mod: S$GLB,,, | Performed by: INTERNAL MEDICINE

## 2018-12-14 PROCEDURE — 99999 PR PBB SHADOW E&M-EST. PATIENT-LVL IV: CPT | Mod: PBBFAC,,, | Performed by: INTERNAL MEDICINE

## 2018-12-14 PROCEDURE — 3074F SYST BP LT 130 MM HG: CPT | Mod: CPTII,S$GLB,, | Performed by: INTERNAL MEDICINE

## 2018-12-14 PROCEDURE — 99499 UNLISTED E&M SERVICE: CPT | Mod: S$GLB,,, | Performed by: INTERNAL MEDICINE

## 2018-12-14 PROCEDURE — 3078F DIAST BP <80 MM HG: CPT | Mod: CPTII,S$GLB,, | Performed by: INTERNAL MEDICINE

## 2018-12-14 PROCEDURE — 36415 COLL VENOUS BLD VENIPUNCTURE: CPT

## 2018-12-14 PROCEDURE — 85025 COMPLETE CBC W/AUTO DIFF WBC: CPT

## 2018-12-14 PROCEDURE — 80053 COMPREHEN METABOLIC PANEL: CPT

## 2018-12-14 RX ORDER — MIRTAZAPINE 7.5 MG/1
TABLET, FILM COATED ORAL
Qty: 90 TABLET | Refills: 1 | Status: SHIPPED | OUTPATIENT
Start: 2018-12-14 | End: 2019-01-10 | Stop reason: CLARIF

## 2018-12-14 RX ORDER — PROCHLORPERAZINE MALEATE 10 MG
TABLET ORAL
Qty: 337 TABLET | Refills: 1 | Status: SHIPPED | OUTPATIENT
Start: 2018-12-14 | End: 2019-05-27

## 2018-12-14 RX ORDER — PROCHLORPERAZINE MALEATE 10 MG
10 TABLET ORAL EVERY 6 HOURS PRN
Qty: 30 TABLET | Refills: 1 | Status: SHIPPED | OUTPATIENT
Start: 2018-12-14 | End: 2018-12-14 | Stop reason: SDUPTHER

## 2018-12-14 RX ORDER — MIRTAZAPINE 7.5 MG/1
7.5 TABLET, FILM COATED ORAL NIGHTLY
Qty: 30 TABLET | Refills: 1 | Status: SHIPPED | OUTPATIENT
Start: 2018-12-14 | End: 2018-12-14 | Stop reason: SDUPTHER

## 2018-12-14 NOTE — LETTER
December 26, 2018      Eric Rose MD  [address]  [place] [address]           [facility]  [address] [facility] [address]  [place] [address]  Phone: [phone]          Patient: Ariadna Villegas   MR Number: [id number]   YOB: 1945   Date of Visit: 12/14/2018       Dear Dr. Eric Rose:    Thank you for referring Ariadna Villegas to me for evaluation. Attached you will find relevant portions of my assessment and plan of care.    If you have questions, please do not hesitate to call me. I look forward to following Ariadna Villegas along with you.    Sincerely,    Vicki Bedoya MD    Enclosure  CC:  No Recipients    If you would like to receive this communication electronically, please contact [email or web address] or [phone] to request more information on [employer] access.    For providers and/or their staff who would like to refer a patient to [facility], please contact us through our one-stop-shop provider referral line, [facility] , at [phone].    If you feel you have received this communication in error or would no longer like to receive these types of communications, please e-mail [email or web address]

## 2018-12-14 NOTE — PROGRESS NOTES
"Subjective:       Patient ID: Ariadna Villegas is a 73 y.o. female.    Chief Complaint: Consult    HPI   REASON FOR CONSULTATION: Recently diagnosed NSCLC  REFERRING PHYSICIAN: Eric Rose MD      Ariadna Villegas is a 73 y.o. female  has a past medical history of Arthritis, COPD (chronic obstructive pulmonary disease), Malignant hypertension, Osteoporosis, postmenopausal, and Tobacco use disorder seen today in consultation for recently diagnsoed NSCLC.   Patient was seen by Dr. Hager in the past for pulmonary nodule and copd.  Patient  was noted to have RUL  nodule noted since 2017. After discussing with IR, the decision is for serial CT per Dr. Hager. CT chest w/contrast 2018 reveals enlarging RUL nodule, at least 1.8 x 1.5cm.She subsequently underwent CT guided bx of right lung mass on 2018. Pathology showed squamous cell carcinoma.  She reports a chronic cough non-productive . No hemoptysis. No CP. Appetite diminished. She has lost 8lbs last week. She is accompanied by family. Family reports pt has historically has had poor appetite . She is nervous/anxious during visit. She is a chronic smoker.  She reports she " cut down" and is smoking 2 cigarettes daily. She reports sleep disturbances. No HA/vision changes. No N/V. She is here for further evaluation.       Sochx; Lives with dtr. No hx of chemical exposure. She is a chronic smoker. She has greater than 50 pack yr history. Denies ETOH use.       Past Medical History:   Diagnosis Date    Arthritis     neck and back    COPD (chronic obstructive pulmonary disease)     Malignant hypertension     Osteoporosis, postmenopausal     Tobacco use disorder          Past Surgical History:   Procedure Laterality Date    Biopsy-Lung N/A 2018    [facility]     x3      HYSTERECTOMY      LUNG BIOPSY N/A 2018    Procedure: Biopsy-Lung;  Surgeon: [facility] Provider;  Location: [facility] " OR 2ND FLR;  Service: Radiology;  Laterality: N/A;    PARTIAL HYSTERECTOMY      , after her third     SALPINGOOPHORECTOMY      , for a ovarian cyst     Review of Systems   Constitutional: Positive for appetite change and fatigue. Negative for fever and unexpected weight change.   HENT: Negative for mouth sores.    Eyes: Negative for visual disturbance.   Respiratory: Positive for cough. Negative for shortness of breath.    Cardiovascular: Negative for chest pain.   Gastrointestinal: Negative for abdominal pain and diarrhea.   Genitourinary: Negative for frequency.   Musculoskeletal: Negative for back pain.   Skin: Negative for rash.   Neurological: Negative for headaches.   Hematological: Negative for adenopathy.   Psychiatric/Behavioral: The patient is nervous/anxious.        Objective:        Vitals:    18 1349   BP: 127/60   BP Location: Right arm   Patient Position: Sitting   BP Method: Medium (Automatic)   Pulse: 75   Temp: 97.4 °F (36.3 °C)   TempSrc: Oral   SpO2: 96%   Weight: 36.9 kg (81 lb 5.6 oz)   Height: 5' (1.524 m)       Physical Exam   Constitutional: She is oriented to person, place, and time. She appears well-developed and well-nourished.   HENT:   Head: Normocephalic.   Mouth/Throat: Oropharynx is clear and moist. No oropharyngeal exudate.   Eyes: Conjunctivae and lids are normal. Pupils are equal, round, and reactive to light. No scleral icterus.   Neck: Normal range of motion. Neck supple. No thyromegaly present.   Cardiovascular: Normal rate, regular rhythm and normal heart sounds.   No murmur heard.  Pulmonary/Chest: Breath sounds normal. She has no wheezes. She has no rales.   Abdominal: Soft. Bowel sounds are normal. She exhibits no distension and no mass. There is no hepatosplenomegaly. There is no tenderness. There is no rebound and no guarding.   Musculoskeletal: Normal range of motion. She exhibits no edema or tenderness.   Lymphadenopathy:     She has no cervical  adenopathy.     She has no axillary adenopathy.        Right: No supraclavicular adenopathy present.        Left: No supraclavicular adenopathy present.   Neurological: She is alert and oriented to person, place, and time. No cranial nerve deficit. Coordination normal.   Skin: Skin is warm and dry. No ecchymosis, no petechiae and no rash noted. No erythema.         CT chest w/out contrast  9/7/2018   Irregular soft tissue opacity right upper lobe does demonstrate some increased soft tissue component on today's study measuring at least 1.8 x 1.5 cm.  Pulmonary consultation and consideration for biopsy and/or PET scan suggested.    4 mm right lower lobe nodule less conspicuous on today's study.    Emphysematous changes noted.    Aortic annulus and coronary artery calcifications again noted.        FINAL PATHOLOGIC DIAGNOSIS 11/27/2018   LUNG NODULE, RIGHT (NEEDLE BIOPSY):  Squamous cell carcinoma in-situ  No definitive invasion identified  Multiple deeper levels examined    Microscopic Examination  Block B  CK 5/6: Positive  P63: Positive  CK 7: Patchy positive  TTF-1: Negative  Immunostains are performed with appropriate controls.    Pulmonary Functions Testing Results: 1/8/18 ratio 62%; fvc 97%; fev1 78%; dlco 41%  ABG none      Assessment:       1. Non-small cell lung cancer, unspecified laterality    2. Chronic obstructive pulmonary disease, unspecified COPD type    3. CKD (chronic kidney disease) stage 3, GFR 30-59 ml/min    4. Sleep disturbances    5. Anorexia        Plan:   Patient is a 73-year-old female with history of COPD, chronic smoker, CKD with recently diagnosed squamous cell carcinoma of the lung.  Plan to complete staging workup with PET/CT imaging and CT imaging of the head without contrast.  Patient has underlying  CKD.  If staging workup unremarkable for any evidence of distant disease disease or lymph node involvement plan referral to CT surgery for formal evaluation for surgical intervention. Pt  also provided for Rx for mirtzazapine 7.5mg po qhs.  All questions posed Answered patient's satisfaction.       Thank you for allowing me to participate in the care of your patient     The distress score of  9 was noted and reviewed. Plan Ambulatory Referral to Hematology/Oncology Psychology     Cc: Eric Rose MD

## 2018-12-19 ENCOUNTER — PATIENT MESSAGE (OUTPATIENT)
Dept: FAMILY MEDICINE | Facility: CLINIC | Age: 73
End: 2018-12-19

## 2018-12-20 ENCOUNTER — HOSPITAL ENCOUNTER (OUTPATIENT)
Dept: RADIOLOGY | Facility: HOSPITAL | Age: 73
Discharge: HOME | End: 2018-12-20
Attending: INTERNAL MEDICINE
Payer: MEDICARE

## 2018-12-20 DIAGNOSIS — C34.90 NON-SMALL CELL LUNG CANCER, UNSPECIFIED LATERALITY: ICD-10-CM

## 2018-12-20 DIAGNOSIS — C34.11 MALIGNANT NEOPLASM OF UPPER LOBE OF RIGHT LUNG: ICD-10-CM

## 2018-12-20 PROCEDURE — A9552 F18 FDG: HCPCS

## 2018-12-20 PROCEDURE — 78815 PET IMAGE W/CT SKULL-THIGH: CPT | Mod: TC,PI

## 2018-12-20 PROCEDURE — 70450 CT HEAD/BRAIN W/O DYE: CPT | Mod: 26,,, | Performed by: RADIOLOGY

## 2018-12-20 PROCEDURE — 78815 PET IMAGE W/CT SKULL-THIGH: CPT | Mod: 26,PI,, | Performed by: RADIOLOGY

## 2018-12-20 PROCEDURE — 70450 CT HEAD/BRAIN W/O DYE: CPT | Mod: TC,59

## 2018-12-26 ENCOUNTER — OFFICE VISIT (OUTPATIENT)
Dept: HEMATOLOGY/ONCOLOGY | Facility: CLINIC | Age: 73
End: 2018-12-26
Payer: MEDICARE

## 2018-12-26 VITALS
TEMPERATURE: 99 F | SYSTOLIC BLOOD PRESSURE: 156 MMHG | HEIGHT: 60 IN | OXYGEN SATURATION: 98 % | HEART RATE: 90 BPM | WEIGHT: 87.75 LBS | DIASTOLIC BLOOD PRESSURE: 75 MMHG | BODY MASS INDEX: 17.23 KG/M2

## 2018-12-26 DIAGNOSIS — C34.90 NON-SMALL CELL LUNG CANCER, UNSPECIFIED LATERALITY: Primary | ICD-10-CM

## 2018-12-26 DIAGNOSIS — J44.9 CHRONIC OBSTRUCTIVE PULMONARY DISEASE, UNSPECIFIED COPD TYPE: Chronic | ICD-10-CM

## 2018-12-26 DIAGNOSIS — N18.30 CKD (CHRONIC KIDNEY DISEASE) STAGE 3, GFR 30-59 ML/MIN: ICD-10-CM

## 2018-12-26 DIAGNOSIS — F17.210 CIGARETTE NICOTINE DEPENDENCE, UNCOMPLICATED: ICD-10-CM

## 2018-12-26 PROCEDURE — 99406 BEHAV CHNG SMOKING 3-10 MIN: CPT | Mod: S$GLB,,, | Performed by: INTERNAL MEDICINE

## 2018-12-26 PROCEDURE — 99499 UNLISTED E&M SERVICE: CPT | Mod: S$GLB,,, | Performed by: INTERNAL MEDICINE

## 2018-12-26 PROCEDURE — 3078F DIAST BP <80 MM HG: CPT | Mod: CPTII,S$GLB,, | Performed by: INTERNAL MEDICINE

## 2018-12-26 PROCEDURE — 3077F SYST BP >= 140 MM HG: CPT | Mod: CPTII,S$GLB,, | Performed by: INTERNAL MEDICINE

## 2018-12-26 PROCEDURE — 99999 PR PBB SHADOW E&M-EST. PATIENT-LVL IV: CPT | Mod: PBBFAC,,, | Performed by: INTERNAL MEDICINE

## 2018-12-26 PROCEDURE — 99215 OFFICE O/P EST HI 40 MIN: CPT | Mod: S$GLB,,, | Performed by: INTERNAL MEDICINE

## 2018-12-26 PROCEDURE — 1101F PT FALLS ASSESS-DOCD LE1/YR: CPT | Mod: CPTII,S$GLB,, | Performed by: INTERNAL MEDICINE

## 2018-12-26 NOTE — PROGRESS NOTES
"Subjective:       Patient ID: Ariadna Villegas is a 73 y.o. female.    Chief Complaint: Follow-up    HPI   Diagnosis:   NSCLC diagnosed 11/27/2018        Ariadna Villegas is a 73 y.o. female  has a past medical history of Arthritis, COPD (chronic obstructive pulmonary disease), Malignant hypertension, Osteoporosis, postmenopausal, and Tobacco use disorder seen today in consultation for recently diagnsoed NSCLC.   Patient was seen by Dr. Hager in the past for pulmonary nodule and copd.  Patient  was noted to have RUL  nodule noted since 11/2017. After discussing with IR, the decision is for serial CT per Dr. Hager. CT chest w/contrast 9/7/2018 reveals enlarging RUL nodule, at least 1.8 x 1.5cm.She subsequently underwent CT guided bx of right lung mass on 11/27/2018. Pathology showed squamous cell carcinoma.  She reports a chronic cough non-productive . No hemoptysis. No CP. Appetite diminished. She has lost 8lbs last week. She is accompanied by family. Family reports pt has historically has had poor appetite . She is nervous/anxious during visit. She is a chronic smoker.  She reports she " cut down" and is smoking 2 cigarettes daily. She reports sleep disturbances. No HA/vision changes. No N/V.          She is accompanied by family.     Today, she is nervous/anxious  She has no new issues  She continues with chronic cough , non-productive  No CP  No hemoptysis  No HA  No N/V    Recent PET/CT 12/20/2018-No evidence of metastatic disease    CT head w/out contrast  reveals homogeneous hypoattenuation change in the right frontal lobe, likely representing encephalomalacia changes from ischemia however;  a metastatic lesion cannot be totally excluded      Sochx; Lives with dtr. No hx of chemical exposure. She is a chronic smoker. She has greater than 50 pack yr history. Denies ETOH use.          Past Medical History:   Diagnosis Date    Arthritis     neck and back    COPD (chronic obstructive pulmonary disease)     " Malignant hypertension     Non-small cell lung cancer 2018    Osteoporosis, postmenopausal     Tobacco use disorder          Past Surgical History:   Procedure Laterality Date    Biopsy-Lung N/A 2018    Performed by [facility] at [facility] OR [address] [place] [address]     x3      HYSTERECTOMY      PARTIAL HYSTERECTOMY      , after her third     SALPINGOOPHORECTOMY      , for a ovarian cyst     Review of Systems   Constitutional: Positive for appetite change and fatigue. Negative for fever and unexpected weight change.   HENT: Negative for mouth sores.    Eyes: Negative for visual disturbance.   Respiratory: Positive for cough. Negative for shortness of breath.    Cardiovascular: Negative for chest pain.   Gastrointestinal: Negative for abdominal pain and diarrhea.   Genitourinary: Negative for frequency.   Musculoskeletal: Negative for back pain.   Skin: Negative for rash.   Neurological: Negative for headaches.   Hematological: Negative for adenopathy.   Psychiatric/Behavioral: The patient is nervous/anxious.        Objective:        Vitals:    18 1441 18 1447   BP: (!) 144/67 (!) 156/75   BP Location: Left arm Right arm   Patient Position: Sitting Sitting   BP Method: Small (Automatic) Small (Automatic)   Pulse: 90    Temp: 98.8 °F (37.1 °C)    TempSrc: Oral    SpO2: 98%    Weight: 39.8 kg (87 lb 11.9 oz)    Height: 5' (1.524 m)        Physical Exam   Constitutional: She is oriented to person, place, and time. She appears well-developed and well-nourished.   HENT:   Head: Normocephalic.   Mouth/Throat: Oropharynx is clear and moist. No oropharyngeal exudate.   Eyes: Conjunctivae and lids are normal. Pupils are equal, round, and reactive to light. No scleral icterus.   Neck: Normal range of motion. Neck supple. No thyromegaly present.   Cardiovascular: Normal rate, regular rhythm and normal heart sounds.   No murmur heard.  Pulmonary/Chest: Breath sounds normal. She  has no wheezes. She has no rales.   Abdominal: Soft. Bowel sounds are normal. She exhibits no distension and no mass. There is no hepatosplenomegaly. There is no tenderness. There is no rebound and no guarding.   Musculoskeletal: Normal range of motion. She exhibits no edema or tenderness.   Lymphadenopathy:     She has no cervical adenopathy.     She has no axillary adenopathy.        Right: No supraclavicular adenopathy present.        Left: No supraclavicular adenopathy present.   Neurological: She is alert and oriented to person, place, and time. No cranial nerve deficit. Coordination normal.   Skin: Skin is warm and dry. No ecchymosis, no petechiae and no rash noted. No erythema.         CT chest w/out contrast  9/7/2018   Irregular soft tissue opacity right upper lobe does demonstrate some increased soft tissue component on today's study measuring at least 1.8 x 1.5 cm.  Pulmonary consultation and consideration for biopsy and/or PET scan suggested.    4 mm right lower lobe nodule less conspicuous on today's study.    Emphysematous changes noted.    Aortic annulus and coronary artery calcifications again noted.    Additional findings as above.       FINAL PATHOLOGIC DIAGNOSIS  11/27/2018   LUNG NODULE, RIGHT (NEEDLE BIOPSY):  Squamous cell carcinoma in-situ  No definitive invasion identified  Multiple deeper levels examined  Concurring pathologists: Sayra Ames and JAYDEN Dos Santos.  Diagnosed by: Elvia Raphael M.D.  (Electronically Signed: 2018-12-04 15:04:06)  Microscopic Examination  Block B  CK 5/6: Positive  P63: Positive  CK 7: Patchy positive  TTF-1: Negative  Immunostains are performed with appropriate controls.        PET/CT 12/20/2018   FDG avid lesion within the right upper lobe compatible with primary lung malignancy.  No abnormal FDG uptake or FDG avid lymphadenopathy.  No evidence of FDG avid metastatic disease      CT head w/out contrast 12/20/2018   1. Inhomogeneous hypoattenuation change in the right  frontal lobe, likely representing encephalomalacia changes from ischemia.  However a metastatic lesion cannot be totally excluded.  See recommendations above.  2. Scattered white matter changes in the left centrum likely from chronic microvascular ischemia.  3. Increased atrophic changes in the cerebral hemispheres and cerebellar hemispheres when compared to the study performed in October 2013      Assessment:       1. Non-small cell lung cancer, unspecified laterality    2. Chronic obstructive pulmonary disease, unspecified COPD type    3. CKD (chronic kidney disease) stage 3, GFR 30-59 ml/min    4. Cigarette nicotine dependence, uncomplicated         Plan:     Patient is a 73-year-old female with history of COPD, chronic smoker, CKD with recently diagnosed squamous cell carcinoma of the lung  cT1b s/p CT guided lung biopsy 11/27/2018 . Recent PET/CT imaging reveals no mediastinal lymph node involvement and no evidence of distant disease.   CT imaging of the head without contrast reveals questionable metastatic lesion. Further imaging with MRI with contrast contraindicated with underlying CKD and elevated Cr level. Plan to discuss with Radiology . Plan formal evaluation with CT surgery for surgical intervention. Pt advised on smoking cessation. Plan Ambulatory Referral to Smoking Cessation Clinic.  All questions posed answered patient's satisfaction.        >35 minutes spent during this visit of which greater than 50% devoted to counseling and coordination of care regarding diagnosis and management plan.    Cc: Eric Rose MD    Tobacco Use/Cessation:  I assessed Ariadna Villegas and discussed smoking cessation with her for 3-10 minutes. She  Social History     Tobacco Use   Smoking Status Former Smoker    Packs/day: 0.15    Years: 60.00    Pack years: 9.00    Types: Cigarettes   Smokeless Tobacco Never Used   Tobacco Comment    1-3 cigarettes a day

## 2018-12-27 ENCOUNTER — TELEPHONE (OUTPATIENT)
Dept: CARDIOLOGY | Facility: CLINIC | Age: 73
End: 2018-12-27

## 2018-12-27 ENCOUNTER — TELEPHONE (OUTPATIENT)
Dept: HEMATOLOGY/ONCOLOGY | Facility: CLINIC | Age: 73
End: 2018-12-27

## 2018-12-27 DIAGNOSIS — C34.90 LUNG CANCER: Primary | ICD-10-CM

## 2018-12-27 NOTE — TELEPHONE ENCOUNTER
----- Message from Maddison De Guzman RN sent at 12/26/2018  3:06 PM CST -----  Regarding: Referral  Good afternoon,  This patient needs to see  or Best as soon as possible, can you assist with an appt please?  Poornima

## 2018-12-27 NOTE — TELEPHONE ENCOUNTER
Received referral for pt to see Thoracic surgery.  Spoke with pt and agreeable with seeing Dr. Jewell on 12/31 and also to schedule updated CT scan.  Instructions given on where to go with verbalized understanding.

## 2018-12-27 NOTE — TELEPHONE ENCOUNTER
appt made for 02/07/19, mailed out as reminder     ef      ----- Message from Camila Elizondo sent at 12/26/2018  4:57 PM CST -----  Contact: [employer] message  ----- Message from [facility], System Message sent at 12/26/2018  4:50 PM CST -----    Appointment Request From: Ariadna Villegas    With Provider: Jair BYRD MD [[facility]]    Preferred Date Range: 2/4/2019 - 2/8/2019    Preferred Times: Monday Afternoon, Tuesday Afternoon, Wednesday Afternoon, Thursday Afternoon, Friday Afternoon    Reason for visit: Existing Patient    Comments:  Six month check up

## 2018-12-29 DIAGNOSIS — I10 ESSENTIAL HYPERTENSION: Chronic | ICD-10-CM

## 2018-12-31 ENCOUNTER — HOSPITAL ENCOUNTER (OUTPATIENT)
Dept: RADIOLOGY | Facility: HOSPITAL | Age: 73
Discharge: HOME | End: 2018-12-31
Attending: PHYSICIAN ASSISTANT
Payer: MEDICARE

## 2018-12-31 ENCOUNTER — OFFICE VISIT (OUTPATIENT)
Dept: CARDIOTHORACIC SURGERY | Facility: CLINIC | Age: 73
End: 2018-12-31
Payer: MEDICARE

## 2018-12-31 VITALS
BODY MASS INDEX: 17.49 KG/M2 | WEIGHT: 89.06 LBS | SYSTOLIC BLOOD PRESSURE: 119 MMHG | TEMPERATURE: 99 F | RESPIRATION RATE: 20 BRPM | HEIGHT: 60 IN | OXYGEN SATURATION: 98 % | DIASTOLIC BLOOD PRESSURE: 57 MMHG | HEART RATE: 72 BPM

## 2018-12-31 DIAGNOSIS — C34.11 MALIGNANT NEOPLASM OF UPPER LOBE OF RIGHT LUNG: Primary | ICD-10-CM

## 2018-12-31 DIAGNOSIS — C34.90 LUNG CANCER: ICD-10-CM

## 2018-12-31 PROCEDURE — 71250 CT THORAX DX C-: CPT | Mod: 26,,, | Performed by: RADIOLOGY

## 2018-12-31 PROCEDURE — 3074F PR MOST RECENT SYSTOLIC BLOOD PRESSURE < 130 MM HG: ICD-10-PCS | Mod: CPTII,S$GLB,, | Performed by: THORACIC SURGERY (CARDIOTHORACIC VASCULAR SURGERY)

## 2018-12-31 PROCEDURE — 71250 CT THORAX DX C-: CPT | Mod: TC

## 2018-12-31 PROCEDURE — 99205 PR OFFICE/OUTPT VISIT, NEW, LEVL V, 60-74 MIN: ICD-10-PCS | Mod: S$GLB,,, | Performed by: THORACIC SURGERY (CARDIOTHORACIC VASCULAR SURGERY)

## 2018-12-31 PROCEDURE — 99205 OFFICE O/P NEW HI 60 MIN: CPT | Mod: S$GLB,,, | Performed by: THORACIC SURGERY (CARDIOTHORACIC VASCULAR SURGERY)

## 2018-12-31 PROCEDURE — 3078F DIAST BP <80 MM HG: CPT | Mod: CPTII,S$GLB,, | Performed by: THORACIC SURGERY (CARDIOTHORACIC VASCULAR SURGERY)

## 2018-12-31 PROCEDURE — 99999 PR PBB SHADOW E&M-EST. PATIENT-LVL V: CPT | Mod: PBBFAC,,, | Performed by: THORACIC SURGERY (CARDIOTHORACIC VASCULAR SURGERY)

## 2018-12-31 PROCEDURE — 3074F SYST BP LT 130 MM HG: CPT | Mod: CPTII,S$GLB,, | Performed by: THORACIC SURGERY (CARDIOTHORACIC VASCULAR SURGERY)

## 2018-12-31 PROCEDURE — 1101F PR PT FALLS ASSESS DOC 0-1 FALLS W/OUT INJ PAST YR: ICD-10-PCS | Mod: CPTII,S$GLB,, | Performed by: THORACIC SURGERY (CARDIOTHORACIC VASCULAR SURGERY)

## 2018-12-31 PROCEDURE — 3078F PR MOST RECENT DIASTOLIC BLOOD PRESSURE < 80 MM HG: ICD-10-PCS | Mod: CPTII,S$GLB,, | Performed by: THORACIC SURGERY (CARDIOTHORACIC VASCULAR SURGERY)

## 2018-12-31 PROCEDURE — 1101F PT FALLS ASSESS-DOCD LE1/YR: CPT | Mod: CPTII,S$GLB,, | Performed by: THORACIC SURGERY (CARDIOTHORACIC VASCULAR SURGERY)

## 2018-12-31 PROCEDURE — 99999 PR PBB SHADOW E&M-EST. PATIENT-LVL V: ICD-10-PCS | Mod: PBBFAC,,, | Performed by: THORACIC SURGERY (CARDIOTHORACIC VASCULAR SURGERY)

## 2018-12-31 PROCEDURE — 71250 CT CHEST WITHOUT CONTRAST: ICD-10-PCS | Mod: 26,,, | Performed by: RADIOLOGY

## 2018-12-31 RX ORDER — AMLODIPINE BESYLATE 10 MG/1
TABLET ORAL
Qty: 90 TABLET | Refills: 0 | Status: SHIPPED | OUTPATIENT
Start: 2018-12-31 | End: 2019-02-19

## 2018-12-31 RX ORDER — METOPROLOL SUCCINATE 50 MG/1
TABLET, EXTENDED RELEASE ORAL
Qty: 270 TABLET | Refills: 0 | Status: SHIPPED | OUTPATIENT
Start: 2018-12-31 | End: 2019-04-08 | Stop reason: SDUPTHER

## 2018-12-31 RX ORDER — LOSARTAN POTASSIUM AND HYDROCHLOROTHIAZIDE 25; 100 MG/1; MG/1
TABLET ORAL
Qty: 90 TABLET | Refills: 0 | Status: SHIPPED | OUTPATIENT
Start: 2018-12-31 | End: 2019-04-08 | Stop reason: SDUPTHER

## 2018-12-31 NOTE — PROGRESS NOTES
History & Physical    SUBJECTIVE:     History of Present Illness:  Patient is a 73 y.o. female with arthritis, COPD, HTN, osteoporosis, CKD presenting to thoracic clinic for follow-up for newly dx NSCLC (11/27/2018.) She was originally followed by Dr. Hager for a known RUL pulmonary nodule, noted since 11/2017 after presenting to the ED with bronchitis, due to repeat chest CT demonstrating an enlarging to 1.8 x 1.5 cm. Thus, a CT guided bx performed on 11/27/2018 with pathology concordant with squamous cell carcinoma in situ without invasion. She reports a 1 week period of weight loss (8 lb) but no shortness of breath, chest pain, hemoptysis, or continued weight loss. She remains active able to walk up a flight of stairs without stopping, METs > 4. She reports no history of chest surgeries or radiation, takes an aspirin 81 mg QD      PSH:  Hysterectomy, C/S x 3    Active tobacco use, currently in the process of quitting 57 years x < 1 PPD, retired x 19 years previously worked at [employer], as a [employer] employee, , no known exposure to asbestos.    NKDA    No chief complaint on file.    Review of patient's allergies indicates:  No Known Allergies    Current Outpatient Medications   Medication Sig Dispense Refill    amLODIPine (NORVASC) 10 MG tablet TAKE 1 TABLET(10 MG) BY MOUTH EVERY DAY 90 tablet 0    aspirin (ECOTRIN) 81 MG EC tablet Take 1 tablet (81 mg total) by mouth once daily.      losartan-hydrochlorothiazide 100-25 mg (HYZAAR) 100-25 mg per tablet TAKE 1 TABLET BY MOUTH EVERY DAY 90 tablet 0    metoprolol succinate (TOPROL-XL) 50 MG 24 hr tablet TAKE 3 TABLETS(150 MG) BY MOUTH EVERY EVENING 270 tablet 0    mirtazapine (REMERON) 7.5 MG Tab TAKE 1 TABLET BY MOUTH EVERY EVENING 90 tablet 1    nicotine (NICODERM CQ) 21 mg/24 hr Place 1 patch onto the skin once daily. 30 patch 1    oxycodone (ROXICODONE) 5 MG immediate release tablet Take 5 mg by mouth every 6 (six) hours as needed.   0     rosuvastatin (CRESTOR) 20 MG tablet Take 1 tablet (20 mg total) by mouth once daily. 30 tablet 11    temazepam (RESTORIL) 7.5 MG Cap TAKE 2 CAPSULES(15 MG) BY MOUTH EVERY EVENING 60 capsule 2    tiZANidine (ZANAFLEX) 4 MG tablet Take 4 mg by mouth nightly as needed.      pantoprazole (PROTONIX) 20 MG tablet Take 1 tablet (20 mg total) by mouth once daily. 90 tablet 1    prochlorperazine (COMPAZINE) 10 MG tablet TAKE 1 TABLET BY MOUTH EVERY 6 HOURS AS NEEDED 337 tablet 1    sertraline (ZOLOFT) 50 MG tablet TAKE 1 TABLET(50 MG) BY MOUTH EVERY DAY 90 tablet 1    triamcinolone acetonide 0.5% (KENALOG) 0.5 % Crea Apply topically 2 (two) times daily. 15 g 0     Current Facility-Administered Medications   Medication Dose Route Frequency Provider Last Rate Last Dose    cyanocobalamin injection 100 mcg  100 mcg Intramuscular Q30 Days WDolly Wolf MD   100 mcg at 17 1509       Past Medical History:   Diagnosis Date    Arthritis     neck and back    COPD (chronic obstructive pulmonary disease)     Malignant hypertension     Non-small cell lung cancer 2018    Osteoporosis, postmenopausal     Tobacco use disorder      Past Surgical History:   Procedure Laterality Date    Biopsy-Lung N/A 2018    Performed by [facility] Provider at [facility] OR [address] [place] [address]     x3      HYSTERECTOMY      PARTIAL HYSTERECTOMY      , after her third     SALPINGOOPHORECTOMY      , for a ovarian cyst     Family History   Problem Relation Age of Onset    Heart disease Mother     Diabetes Mother     Stroke Father     Heart disease Sister      Social History     Tobacco Use    Smoking status: Former Smoker     Packs/day: 0.15     Years: 60.00     Pack years: 9.00     Types: Cigarettes    Smokeless tobacco: Never Used    Tobacco comment: 1-3 cigarettes a day   Substance Use Topics    Alcohol use: Yes     Comment: socially    Drug use: No        Review of Systems:  Review of  Systems   Constitutional: Positive for diaphoresis. Negative for activity change and fever.        Night sweats   HENT: Negative for congestion, rhinorrhea and sore throat.    Eyes: Positive for visual disturbance. Negative for pain and discharge.        Blurry vision    Respiratory: Negative for chest tightness and shortness of breath.    Cardiovascular: Negative for chest pain.   Gastrointestinal: Negative for abdominal distention, diarrhea, nausea and vomiting.        Incontinence    Endocrine: Negative for polydipsia and polyphagia.   Genitourinary: Negative for difficulty urinating.   Musculoskeletal: Negative for gait problem.   Skin: Negative for color change and rash.   Neurological: Positive for dizziness and weakness. Negative for facial asymmetry.       OBJECTIVE:     Vital Signs (Most Recent)  Temp: 98.5 °F (36.9 °C) (12/31/18 1430)  Pulse: 72 (12/31/18 1430)  Resp: 20 (12/31/18 1430)  BP: (!) 119/57 (12/31/18 1430)  SpO2: 98 % (12/31/18 1430)  5' (1.524 m)  40.4 kg (89 lb 1.1 oz)     Physical Exam:  Physical Exam   Constitutional: She is oriented to person, place, and time. She appears well-developed and well-nourished.   HENT:   Head: Normocephalic and atraumatic.   Eyes: EOM are normal.   Neck: Normal range of motion. No JVD present.   Cardiovascular: Normal rate.   Pulmonary/Chest: Effort normal.   Abdominal: Soft. There is no rebound and no guarding.   Musculoskeletal: Normal range of motion.   Neurological: She is alert and oriented to person, place, and time.   Skin: Skin is warm and dry.   Psychiatric: She has a normal mood and affect. Thought content normal.     Laboratory  CBC: Reviewed  BMP: Reviewed    Diagnostic Results:    1/8/2018 CT non con chest     1.  Irregular opacity in the anterior segment of the right upper lobe, which does not appear significantly changed in size when compared to the recent study, but continues to remain concerning for malignancy. Clinical considerations will  determine the need for further surveillance and/or biopsy.    2.  0.4 cm pulmonary nodule in the right lower lobe, which appears more conspicuous on today's examination.    3.  Calcification of the aortic valve and coronary arteries.    9/7/2018 CT non con chest    Irregular soft tissue opacity right upper lobe does demonstrate some increased soft tissue component on today's study measuring at least 1.8 x 1.5 cm.  Pulmonary consultation and consideration for biopsy and/or PET scan suggested.    4 mm right lower lobe nodule less conspicuous on today's study.    Emphysematous changes noted.    Aortic annulus and coronary artery calcifications again noted.    12/20/2018 PET - negative for metastatic disease     9/7/2018 Nuclear Medicine Stress Test      1. The perfusion scan is free of evidence for myocardial ischemia or injury.   2. Resting wall motion is physiologic. There was abnormal septal motion possibly due to a conduction delay or post surgical changes.   3. Visually estimated LV function is normal.   4. The ventricular volumes are normal at rest and stress.   5. The extracardiac distribution of radioactivity is normal.   6. When compared to the previous study from 11/30/2016, abnormal septal motion now noted, otherwise normal perfusion/EF.    12/20/2018 CT non con head    1. Inhomogeneous hypoattenuation change in the right frontal lobe, likely representing encephalomalacia changes from ischemia.  However a metastatic lesion cannot be totally excluded.  See recommendations above.  2. Scattered white matter changes in the left centrum likely from chronic microvascular ischemia.  3. Increased atrophic changes in the cerebral hemispheres and cerebellar hemispheres when compared to the study performed in October 2013.    12/20/2018 NM PET/CT    FDG avid lesion within the right upper lobe compatible with primary lung malignancy.  No abnormal FDG uptake or FDG avid lymphadenopathy.  No evidence of FDG avid  metastatic disease.    ASSESSMENT/PLAN:     Ms. Villegas is a 73 year old female with COPD, arthritis, HTN, tobacco use, CKD presenting for further intervention with tissue dx of SCC of the RUL with negative PET/CT.      PLAN:Plan     - will require further work-up with repeat PFTs (ordered)   - surgical consent signed in clinic for right VATs, wedge vs lobectomy with mediastinoscopy and all indicated procedures, along with a blood consent   - scheduled for 1/16    The patient was informed of the risks of the procedure including: bleeding, reoperation, pneumonia, shortness of breath, need for prolonged postoperative oxygen therapy, prolonged chest tube airleak and/or drainage, intra-thoracic infection, wound infection, inability of the operative team to complete the proposed procedures integral to the operation, complications related to cardiac events (hypotension - low blood pressure, cardiac arrhythmia, cardiac arrest, heart attack), stroke, pulmonary embolus, possible airway problems, the need for prolonged postoperative ventilatory support / tracheostomy, and the possibility of failure of the stated goals of the operation. The patient understood these risks, which were infrequent in occurrence, and preoperatively consented to the proposed operation.      ATTENDING ATTESTATION:    I evaluated the patient and I agree with the assessment and plan.  72 yo female with biopsy-proven squamous cell carcinoma of the upper lobe of the right lung, clinically stage I.  FEV1 is preserved but DLCO significantly reduced, leaving her at high risk for home supplemental oxygen requirement post-operatively.  Sublobar resection would be the best surgical option and we also discussed SBRT.  Will obtain new PFTs to help best determine treatment plan.

## 2018-12-31 NOTE — LETTER
January 3, 2019      Vicki Bedoya MD  [address] [facility] [address]  [address]  [place] [address]           [facility]  [facility]  [place] [address]  Phone: [phone]  Fax: [phone]          Patient: Ariadna Villegas   MR Number: [id number]   YOB: 1945   Date of Visit: 12/31/2018       Dear Dr. Vicki Bedoya:    Thank you for referring Ariadna Villegas to me for evaluation. Attached you will find relevant portions of my assessment and plan of care.    If you have questions, please do not hesitate to call me. I look forward to following Ariadna Villegas along with you.    Sincerely,    Celso Jewell MD    Enclosure  CC:  No Recipients    If you would like to receive this communication electronically, please contact [email or web address] or [phone] to request more information on [employer] Link access.    For providers and/or their staff who would like to refer a patient to [facility], please contact us through our one-stop-shop provider referral line, [facility], at [phone].    If you feel you have received this communication in error or would no longer like to receive these types of communications, please e-mail [email or web address]

## 2018-12-31 NOTE — H&P (VIEW-ONLY)
History & Physical    SUBJECTIVE:     History of Present Illness:  Patient is a 73 y.o. female with arthritis, COPD, HTN, osteoporosis, CKD presenting to thoracic clinic for follow-up for newly dx NSCLC (11/27/2018.) She was originally followed by Dr. Hager for a known RUL pulmonary nodule, noted since 11/2017 after presenting to the ED with bronchitis, due to repeat chest CT demonstrating an enlarging to 1.8 x 1.5 cm. Thus, a CT guided bx performed on 11/27/2018 with pathology concordant with squamous cell carcinoma in situ without invasion. She reports a 1 week period of weight loss (8 lb) but no shortness of breath, chest pain, hemoptysis, or continued weight loss. She remains active able to walk up a flight of stairs without stopping, METs > 4. She reports no history of chest surgeries or radiation, takes an aspirin 81 mg QD      PSH:  Hysterectomy, C/S x 3    Active tobacco use, currently in the process of quitting 57 years x < 1 PPD, retired x 19 years previously worked at [employer], as a [employer] employee, , no known exposure to asbestos.    NKDA    No chief complaint on file.    Review of patient's allergies indicates:  No Known Allergies    Current Outpatient Medications   Medication Sig Dispense Refill    amLODIPine (NORVASC) 10 MG tablet TAKE 1 TABLET(10 MG) BY MOUTH EVERY DAY 90 tablet 0    aspirin (ECOTRIN) 81 MG EC tablet Take 1 tablet (81 mg total) by mouth once daily.      losartan-hydrochlorothiazide 100-25 mg (HYZAAR) 100-25 mg per tablet TAKE 1 TABLET BY MOUTH EVERY DAY 90 tablet 0    metoprolol succinate (TOPROL-XL) 50 MG 24 hr tablet TAKE 3 TABLETS(150 MG) BY MOUTH EVERY EVENING 270 tablet 0    mirtazapine (REMERON) 7.5 MG Tab TAKE 1 TABLET BY MOUTH EVERY EVENING 90 tablet 1    nicotine (NICODERM CQ) 21 mg/24 hr Place 1 patch onto the skin once daily. 30 patch 1    oxycodone (ROXICODONE) 5 MG immediate release tablet Take 5 mg by mouth every 6 (six) hours as needed.   0     rosuvastatin (CRESTOR) 20 MG tablet Take 1 tablet (20 mg total) by mouth once daily. 30 tablet 11    temazepam (RESTORIL) 7.5 MG Cap TAKE 2 CAPSULES(15 MG) BY MOUTH EVERY EVENING 60 capsule 2    tiZANidine (ZANAFLEX) 4 MG tablet Take 4 mg by mouth nightly as needed.      pantoprazole (PROTONIX) 20 MG tablet Take 1 tablet (20 mg total) by mouth once daily. 90 tablet 1    prochlorperazine (COMPAZINE) 10 MG tablet TAKE 1 TABLET BY MOUTH EVERY 6 HOURS AS NEEDED 337 tablet 1    sertraline (ZOLOFT) 50 MG tablet TAKE 1 TABLET(50 MG) BY MOUTH EVERY DAY 90 tablet 1    triamcinolone acetonide 0.5% (KENALOG) 0.5 % Crea Apply topically 2 (two) times daily. 15 g 0     Current Facility-Administered Medications   Medication Dose Route Frequency Provider Last Rate Last Dose    cyanocobalamin injection 100 mcg  100 mcg Intramuscular Q30 Days WDolly Wolf MD   100 mcg at 17 1509       Past Medical History:   Diagnosis Date    Arthritis     neck and back    COPD (chronic obstructive pulmonary disease)     Malignant hypertension     Non-small cell lung cancer 2018    Osteoporosis, postmenopausal     Tobacco use disorder      Past Surgical History:   Procedure Laterality Date    Biopsy-Lung N/A 2018    Performed by [facility] Diagnostic Provider at [facility] OR [address] [place] [address]     x3      HYSTERECTOMY      PARTIAL HYSTERECTOMY      , after her third     SALPINGOOPHORECTOMY      , for a ovarian cyst     Family History   Problem Relation Age of Onset    Heart disease Mother     Diabetes Mother     Stroke Father     Heart disease Sister      Social History     Tobacco Use    Smoking status: Former Smoker     Packs/day: 0.15     Years: 60.00     Pack years: 9.00     Types: Cigarettes    Smokeless tobacco: Never Used    Tobacco comment: 1-3 cigarettes a day   Substance Use Topics    Alcohol use: Yes     Comment: socially    Drug use: No        Review of Systems:  Review of  Systems   Constitutional: Positive for diaphoresis. Negative for activity change and fever.        Night sweats   HENT: Negative for congestion, rhinorrhea and sore throat.    Eyes: Positive for visual disturbance. Negative for pain and discharge.        Blurry vision    Respiratory: Negative for chest tightness and shortness of breath.    Cardiovascular: Negative for chest pain.   Gastrointestinal: Negative for abdominal distention, diarrhea, nausea and vomiting.        Incontinence    Endocrine: Negative for polydipsia and polyphagia.   Genitourinary: Negative for difficulty urinating.   Musculoskeletal: Negative for gait problem.   Skin: Negative for color change and rash.   Neurological: Positive for dizziness and weakness. Negative for facial asymmetry.       OBJECTIVE:     Vital Signs (Most Recent)  Temp: 98.5 °F (36.9 °C) (12/31/18 1430)  Pulse: 72 (12/31/18 1430)  Resp: 20 (12/31/18 1430)  BP: (!) 119/57 (12/31/18 1430)  SpO2: 98 % (12/31/18 1430)  5' (1.524 m)  40.4 kg (89 lb 1.1 oz)     Physical Exam:  Physical Exam   Constitutional: She is oriented to person, place, and time. She appears well-developed and well-nourished.   HENT:   Head: Normocephalic and atraumatic.   Eyes: EOM are normal.   Neck: Normal range of motion. No JVD present.   Cardiovascular: Normal rate.   Pulmonary/Chest: Effort normal.   Abdominal: Soft. There is no rebound and no guarding.   Musculoskeletal: Normal range of motion.   Neurological: She is alert and oriented to person, place, and time.   Skin: Skin is warm and dry.   Psychiatric: She has a normal mood and affect. Thought content normal.     Laboratory  CBC: Reviewed  BMP: Reviewed    Diagnostic Results:    1/8/2018 CT non con chest     1.  Irregular opacity in the anterior segment of the right upper lobe, which does not appear significantly changed in size when compared to the recent study, but continues to remain concerning for malignancy. Clinical considerations will  determine the need for further surveillance and/or biopsy.    2.  0.4 cm pulmonary nodule in the right lower lobe, which appears more conspicuous on today's examination.    3.  Calcification of the aortic valve and coronary arteries.    9/7/2018 CT non con chest    Irregular soft tissue opacity right upper lobe does demonstrate some increased soft tissue component on today's study measuring at least 1.8 x 1.5 cm.  Pulmonary consultation and consideration for biopsy and/or PET scan suggested.    4 mm right lower lobe nodule less conspicuous on today's study.    Emphysematous changes noted.    Aortic annulus and coronary artery calcifications again noted.    12/20/2018 PET - negative for metastatic disease     9/7/2018 Nuclear Medicine Stress Test      1. The perfusion scan is free of evidence for myocardial ischemia or injury.   2. Resting wall motion is physiologic. There was abnormal septal motion possibly due to a conduction delay or post surgical changes.   3. Visually estimated LV function is normal.   4. The ventricular volumes are normal at rest and stress.   5. The extracardiac distribution of radioactivity is normal.   6. When compared to the previous study from 11/30/2016, abnormal septal motion now noted, otherwise normal perfusion/EF.    12/20/2018 CT non con head    1. Inhomogeneous hypoattenuation change in the right frontal lobe, likely representing encephalomalacia changes from ischemia.  However a metastatic lesion cannot be totally excluded.  See recommendations above.  2. Scattered white matter changes in the left centrum likely from chronic microvascular ischemia.  3. Increased atrophic changes in the cerebral hemispheres and cerebellar hemispheres when compared to the study performed in October 2013.    12/20/2018 NM PET/CT    FDG avid lesion within the right upper lobe compatible with primary lung malignancy.  No abnormal FDG uptake or FDG avid lymphadenopathy.  No evidence of FDG avid  metastatic disease.    ASSESSMENT/PLAN:     Ms. Villegas is a 73 year old female with COPD, arthritis, HTN, tobacco use, CKD presenting for further intervention with tissue dx of SCC of the RUL with negative PET/CT.      PLAN:Plan     - will require further work-up with repeat PFTs (ordered)   - surgical consent signed in clinic for right VATs, wedge vs lobectomy with mediastinoscopy and all indicated procedures, along with a blood consent   - scheduled for 1/16    The patient was informed of the risks of the procedure including: bleeding, reoperation, pneumonia, shortness of breath, need for prolonged postoperative oxygen therapy, prolonged chest tube airleak and/or drainage, intra-thoracic infection, wound infection, inability of the operative team to complete the proposed procedures integral to the operation, complications related to cardiac events (hypotension - low blood pressure, cardiac arrhythmia, cardiac arrest, heart attack), stroke, pulmonary embolus, possible airway problems, the need for prolonged postoperative ventilatory support / tracheostomy, and the possibility of failure of the stated goals of the operation. The patient understood these risks, which were infrequent in occurrence, and preoperatively consented to the proposed operation.      ATTENDING ATTESTATION:    I evaluated the patient and I agree with the assessment and plan.  72 yo female with biopsy-proven squamous cell carcinoma of the upper lobe of the right lung, clinically stage I.  FEV1 is preserved but DLCO significantly reduced, leaving her at high risk for home supplemental oxygen requirement post-operatively.  Sublobar resection would be the best surgical option and we also discussed SBRT.  Will obtain new PFTs to help best determine treatment plan.

## 2019-01-02 ENCOUNTER — TELEPHONE (OUTPATIENT)
Dept: FAMILY MEDICINE | Facility: CLINIC | Age: 74
End: 2019-01-02

## 2019-01-02 ENCOUNTER — HOSPITAL ENCOUNTER (OUTPATIENT)
Dept: RESPIRATORY THERAPY | Facility: HOSPITAL | Age: 74
Discharge: HOME | End: 2019-01-02
Attending: THORACIC SURGERY (CARDIOTHORACIC VASCULAR SURGERY)
Payer: MEDICARE

## 2019-01-02 ENCOUNTER — ANESTHESIA EVENT (OUTPATIENT)
Dept: SURGERY | Facility: HOSPITAL | Age: 74
DRG: 165 | End: 2019-01-02
Payer: MEDICARE

## 2019-01-02 ENCOUNTER — TELEPHONE (OUTPATIENT)
Dept: CARDIOLOGY | Facility: CLINIC | Age: 74
End: 2019-01-02

## 2019-01-02 ENCOUNTER — TELEPHONE (OUTPATIENT)
Dept: PREADMISSION TESTING | Facility: HOSPITAL | Age: 74
End: 2019-01-02

## 2019-01-02 VITALS — RESPIRATION RATE: 18 BRPM | HEART RATE: 70 BPM | OXYGEN SATURATION: 98 %

## 2019-01-02 DIAGNOSIS — E88.89 OTHER SPECIFIED METABOLIC DISORDERS: ICD-10-CM

## 2019-01-02 DIAGNOSIS — Z01.818 PRE-OP EVALUATION: Primary | ICD-10-CM

## 2019-01-02 DIAGNOSIS — Z01.818 PRE-OP EVALUATION: ICD-10-CM

## 2019-01-02 DIAGNOSIS — Z01.818 PREOP TESTING: Primary | ICD-10-CM

## 2019-01-02 DIAGNOSIS — Z09 ENCOUNTER FOR FOLLOW-UP EXAMINATION AFTER COMPLETED TREATMENT FOR CONDITIONS OTHER THAN MALIGNANT NEOPLASM: ICD-10-CM

## 2019-01-02 LAB
BRPFT: ABNORMAL
DLCO ADJ PRE: 6.89 ML/(MIN*MMHG) (ref 12.38–23.85)
DLCO SINGLE BREATH LLN: 12.38
DLCO SINGLE BREATH PRE REF: 38 %
DLCO SINGLE BREATH REF: 18.12
DLCOC SBVA LLN: 2.59
DLCOC SBVA PRE REF: 45.8 %
DLCOC SBVA REF: 4.27
DLCOC SINGLE BREATH LLN: 12.38
DLCOC SINGLE BREATH PRE REF: 38 %
DLCOC SINGLE BREATH REF: 18.12
DLCOVA LLN: 2.59
DLCOVA PRE REF: 45.8 %
DLCOVA PRE: 1.96 ML/(MIN*MMHG*L) (ref 2.59–5.95)
DLCOVA REF: 4.27
DLVAADJ PRE: 1.96 ML/(MIN*MMHG*L) (ref 2.59–5.95)
ERV LLN: 0.54
ERV REF: 0.54
ERVN2 LLN: 0.54
ERVN2 PRE REF: 149 %
ERVN2 PRE: 0.81 L (ref 0.54–0.54)
ERVN2 REF: 0.54
FEF 25 75 CHG: -35.8 %
FEF 25 75 LLN: 0.68
FEF 25 75 POST REF: 27.5 %
FEF 25 75 PRE REF: 42.8 %
FEF 25 75 REF: 1.55
FET100 CHG: 34.3 %
FEV1 CHG: -14.3 %
FEV1 FVC CHG: -10.1 %
FEV1 FVC LLN: 64
FEV1 FVC POST REF: 74.1 %
FEV1 FVC PRE REF: 82.5 %
FEV1 FVC REF: 78
FEV1 LLN: 1.28
FEV1 POST REF: 68.7 %
FEV1 PRE REF: 80.1 %
FEV1 REF: 1.79
FEV6 CHG: -10.5 %
FEV6 LLN: 1.64
FEV6 POST REF: 85.3 %
FEV6 POST: 1.91 L (ref 1.64–2.83)
FEV6 PRE REF: 95.3 %
FEV6 PRE: 2.13 L (ref 1.64–2.83)
FEV6 REF: 2.24
FRCN2 LLN: 1.66
FRCN2 PRE REF: 86.5 %
FRCN2 REF: 2.48
FRCPLETH LLN: 1.66
FRCPLETH REF: 2.48
FVC CHG: -4.7 %
FVC LLN: 1.66
FVC POST REF: 91.9 %
FVC PRE REF: 96.4 %
FVC REF: 2.31
IVC PRE: 2.05 L (ref 1.66–2.97)
IVC SINGLE BREATH LLN: 1.66
IVC SINGLE BREATH PRE REF: 88.7 %
IVC SINGLE BREATH REF: 2.31
MVV LLN: 53
MVV REF: 62
PEF CHG: 12.9 %
PEF LLN: 3.22
PEF POST REF: 67.7 %
PEF PRE REF: 59.9 %
PEF REF: 4.71
POST FEF 25 75: 0.43 L/S (ref 0.68–2.42)
POST FET 100: 11.74 SEC
POST FEV1 FVC: 57.92 % (ref 64.07–92.16)
POST FEV1: 1.23 L (ref 1.28–2.31)
POST FVC: 2.13 L (ref 1.66–2.97)
POST PEF: 3.19 L/S (ref 3.22–6.21)
PRE DLCO: 6.89 ML/(MIN*MMHG) (ref 12.38–23.85)
PRE FEF 25 75: 0.66 L/S (ref 0.68–2.42)
PRE FET 100: 8.74 SEC
PRE FEV1 FVC: 64.41 % (ref 64.07–92.16)
PRE FEV1: 1.44 L (ref 1.28–2.31)
PRE FRC N2: 2.14 L
PRE FVC: 2.23 L (ref 1.66–2.97)
PRE PEF: 2.82 L/S (ref 3.22–6.21)
RAW LLN: 3.06
RAW REF: 3.06
RV LLN: 1.36
RV REF: 1.94
RVN2 LLN: 1.36
RVN2 PRE REF: 60.9 %
RVN2 PRE: 1.18 L (ref 1.36–2.51)
RVN2 REF: 1.94
RVN2TLCN2 LLN: 35
RVN2TLCN2 PRE REF: 77.8 %
RVN2TLCN2 PRE: 34.31 % (ref 34.53–53.71)
RVN2TLCN2 REF: 44
RVTLC LLN: 35
RVTLC REF: 44
TLC LLN: 3.25
TLC REF: 4.24
TLCN2 LLN: 3.25
TLCN2 PRE REF: 80.9 %
TLCN2 PRE: 3.43 L (ref 3.25–5.23)
TLCN2 REF: 4.24
VA PRE: 3.52 L (ref 4.09–4.09)
VA SINGLE BREATH LLN: 4.09
VA SINGLE BREATH PRE REF: 86.1 %
VA SINGLE BREATH REF: 4.09
VC LLN: 1.66
VC REF: 2.31
VCMAXN2 LLN: 1.66
VCMAXN2 PRE REF: 97.4 %
VCMAXN2 PRE: 2.25 L (ref 1.66–2.97)
VCMAXN2 REF: 2.31

## 2019-01-02 PROCEDURE — 94060 EVALUATION OF WHEEZING: CPT

## 2019-01-02 PROCEDURE — 25000242 PHARM REV CODE 250 ALT 637 W/ HCPCS: Performed by: THORACIC SURGERY (CARDIOTHORACIC VASCULAR SURGERY)

## 2019-01-02 PROCEDURE — 94760 N-INVAS EAR/PLS OXIMETRY 1: CPT

## 2019-01-02 RX ORDER — ALBUTEROL SULFATE 2.5 MG/.5ML
2.5 SOLUTION RESPIRATORY (INHALATION) ONCE
Status: COMPLETED | OUTPATIENT
Start: 2019-01-02 | End: 2019-01-02

## 2019-01-02 RX ADMIN — ALBUTEROL SULFATE 2.5 MG: 2.5 SOLUTION RESPIRATORY (INHALATION) at 04:01

## 2019-01-02 NOTE — TELEPHONE ENCOUNTER
"Dr Smiley is out til 01/04/19, will not have a reply til then    radha Melaraika         ----- Message from Frances Gutiérrez RN sent at 1/2/2019 11:21 AM CST -----  Patient is having  a VATS,With Wedge Resection, Lung/ VATS, with Lobectomy /Mediastinoscopy on 1/16/19, with Dr. Jewell. Requesting a "Clearance" from you prior to patients' surgery date. Patient was recently seen by you on 9/17/18. Await your reply. Thank you.  Sincerely, Frances Gutiérrez, LAURA / [id number]    "

## 2019-01-02 NOTE — ANESTHESIA PREPROCEDURE EVALUATION
"Anesthesia Assessment: Preoperative EQUATION    Planned Procedure: Procedure(s) (LRB):  VATS, WITH WEDGE RESECTION, LUNG (Right)  VATS, WITH LOBECTOMY (Right)  MEDIASTINOSCOPY (Right)  Requested Anesthesia Type:General  Surgeon: Celso Jewell MD  Service: Thoracic  Known or anticipated Date of Surgery:1/16/2019    Surgeon notes: reviewed and Malignant neoplasm of lung, unspecified laterality, unspecified part of lunf  Previous anesthesia records:Not available and No anesthesia records found in [facility] system. Patient has had several surgeries done at [facility] facilities.    Last PCP note: 3-6 months ago , within [facility] , focused visit   Subspecialty notes: Cardiology: General, Hematology/Oncology, Pulmonary  Tests already available:  Results have been reviewed. Labs on 12/14/18-CMP/CBC/BMP/11/27/18-PT-INR/CBC/CXR-11/27/18/ EKG-9/7/18 (storage only) & 8/20/18/Pharm Stress Test-9/7/18              Plan:    Testing:  T&S        Patient  has previously scheduled Medical Appointment:Appointment on 1/2/19,    Navigation: Tests Scheduled. Lab-T&S on 1/10/19 @ 4:30p             Consults scheduled. POC on 1/10/19 @ 3p & Requested "Clearance" from [facility] PCP-Dr. Snell & [facility] Cards-Dr. Alicea-PENDING             Results will be tracked by Preop Clinic.                Frances Gutiérrez RN  1/2/19   Addendum: Patient "Clearance " from [facility]- in chart note on 1/4/19 & patient has "Clearance" appt. On 1/4/19 @ 2p with PCP-Dr. Snell.-PENDING.  Frances Gutiérrez RN  1/4/19      Addendum:"Clearance" in chart  On 1/4/19, from AMRITA Zuniga/(PCP-). Frances Gutiérrez RN  1/7/19 01/02/2019  Pre-operative evaluation for Procedure(s) (LRB):  VATS, WITH WEDGE RESECTION, LUNG (Right)  VATS, WITH LOBECTOMY (Right)  MEDIASTINOSCOPY (Right)    Ariadna Villegas is a 73 y.o. female hx of CKDIII, HTN, COPD presenting " for the above procedure 2/2 lung mass    PFT with decreased diffusing capacity    Patient Active Problem List   Diagnosis    Renal artery stenosis    DDD (degenerative disc disease), lumbar    Cervicalgia    Insomnia    Mastoid pain    Osteoporosis    Hyperlipidemia    Thoracolumbar back pain    Generalized osteoarthrosis, unspecified site    BPV (benign positional vertigo)    Hypovitaminosis D    CKD (chronic kidney disease) stage 3, GFR 30-59 ml/min    Chest pain    Essential hypertension    COPD (chronic obstructive pulmonary disease)    Fatigue    Sedative hypnotic or anxiolytic dependence    B12 deficiency    Pulmonary nodule    Centrilobular emphysema    Tobacco abuse    Lung mass    Non-small cell lung cancer    Squamous cell lung cancer       Review of patient's allergies indicates:   Allergen Reactions    Lunesta [eszopiclone] Other (See Comments)     Sleep walking       No current facility-administered medications on file prior to encounter.      Current Outpatient Medications on File Prior to Encounter   Medication Sig Dispense Refill    amLODIPine (NORVASC) 10 MG tablet TAKE 1 TABLET(10 MG) BY MOUTH EVERY DAY 90 tablet 0    losartan-hydrochlorothiazide 100-25 mg (HYZAAR) 100-25 mg per tablet TAKE 1 TABLET BY MOUTH EVERY DAY 90 tablet 0    metoprolol succinate (TOPROL-XL) 50 MG 24 hr tablet TAKE 3 TABLETS(150 MG) BY MOUTH EVERY EVENING 270 tablet 0    oxycodone (ROXICODONE) 5 MG immediate release tablet Take 5 mg by mouth every 6 (six) hours as needed.   0    rosuvastatin (CRESTOR) 20 MG tablet Take 1 tablet (20 mg total) by mouth once daily. (Patient taking differently: Take 20 mg by mouth every morning. ) 30 tablet 11    temazepam (RESTORIL) 7.5 MG Cap TAKE 2 CAPSULES(15 MG) BY MOUTH EVERY EVENING 60 capsule 2    tiZANidine (ZANAFLEX) 4 MG tablet Take 4 mg by mouth nightly as needed.      aspirin (ECOTRIN) 81 MG EC tablet Take 1 tablet (81 mg total) by mouth once daily.       prochlorperazine (COMPAZINE) 10 MG tablet TAKE 1 TABLET BY MOUTH EVERY 6 HOURS AS NEEDED 337 tablet 1    sertraline (ZOLOFT) 50 MG tablet TAKE 1 TABLET(50 MG) BY MOUTH EVERY DAY (Patient taking differently: Take 50 mg by mouth every evening. TAKE 1 TABLET(50 MG) BY MOUTH EVERY DAY) 90 tablet 1       Past Surgical History:   Procedure Laterality Date    Biopsy-Lung N/A 2018    Performed by [facility] Diagnostic Provider at [facility] OR [address] [place] [address]     x3      HYSTERECTOMY      PARTIAL HYSTERECTOMY      , after her third     SALPINGOOPHORECTOMY      , for a ovarian cyst       Social History     Socioeconomic History    Marital status:      Spouse name: Not on file    Number of children: Not on file    Years of education: Not on file    Highest education level: Not on file   Social Needs    Financial resource strain: Not on file    Food insecurity - worry: Not on file    Food insecurity - inability: Not on file    Transportation needs - medical: Not on file    Transportation needs - non-medical: Not on file   Occupational History    Occupation: Retired   Tobacco Use    Smoking status: Former Smoker     Packs/day: 0.15     Years: 60.00     Pack years: 9.00     Types: Cigarettes    Smokeless tobacco: Never Used    Tobacco comment: quit x 2-3 wks. ago   Substance and Sexual Activity    Alcohol use: Yes     Comment: socially    Drug use: No    Sexual activity: No     Partners: Male     Comment:    Other Topics Concern    Not on file   Social History Narrative    Not on file         CBC: No results for input(s): WBC, RBC, HGB, HCT, PLT, MCV, MCH, MCHC in the last 72 hours.    CMP: No results for input(s): NA, K, CL, CO2, BUN, CREATININE, GLU, MG, PHOS, CALCIUM, ALBUMIN, PROT, ALKPHOS, ALT, AST, BILITOT in the last 72 hours.    INR  No results for input(s): PT, INR, PROTIME, APTT in the last 72 hours.        Diagnostic Studies:      EKG: NSR      2D Echo:  Results for  orders placed or performed during the hospital encounter of 09/07/18   2D echo with color flow doppler   Result Value Ref Range    QEF 60 55 - 65    Diastolic Dysfunction No     Est. PA Systolic Pressure 27.01     Pericardial Effusion NONE     Mitral Valve Mobility NORMAL          Anesthesia Evaluation    I have reviewed the Patient Summary Reports.    I have reviewed the Nursing Notes.   I have reviewed the Medications.     Review of Systems  Anesthesia Hx:  No problems with previous Anesthesia  History of prior surgery of interest to airway management or planning: Previous anesthesia: General Colonscopy 11/2018 with general anesthesia.  Denies Family Hx of Anesthesia complications.   Denies Personal Hx of Anesthesia complications.   Social:  Stopped 2 days ago; 0.5 ppd x 50 yrs; no alcohol   Hematology/Oncology:  Hematology Normal      Current/Recent Cancer. (NSCLC right lung)   EENT/Dental:   Glasses for reading   Cardiovascular:   Exercise tolerance: good Hypertension, well controlled hyperlipidemia ECG has been reviewed.  Functional Capacity good / => 4 METS, walked from garage to POC without stopping; climb 1 FOS; denies CP, SOB    Pulmonary:   COPD Denies Asthma.  Denies Sleep Apnea.    Renal/:   Chronic Renal Disease (CKD 3), CRI    Hepatic/GI:   Denies GERD. Denies Liver Disease.    Musculoskeletal:   Arthritis  osteoporosis Spine Disorders: lumbar Degenerative disease    Neurological:   Denies CVA. Denies Seizures.  Denies Pain    Endocrine:   Denies Diabetes.        Physical Exam  General:  Well nourished    Airway/Jaw/Neck:  Airway Findings: Mouth Opening: Normal Tongue: Normal  General Airway Assessment: Adult  Mallampati: II  Improves to II with phonation.  TM Distance: Normal, at least 6 cm  Jaw/Neck Findings:     Neck ROM: Normal ROM      Dental:  Dental Findings: Upper Dentures, Lower Dentures   Chest/Lungs:  Chest/Lungs Findings: Clear to auscultation, Normal Respiratory Rate      Heart/Vascular:  Heart Findings: Rate: Normal  Rhythm: Regular Rhythm  Sounds: Normal        Mental Status:  Mental Status Findings:  Cooperative, Alert and Oriented       Pt was seen in POC 1/10/19; cleared by Ms Efrain NP (); pending cardiology clearance-Dr Smiley  /Poly Bryant RN    Addendum 1/15/19 1005: per cardiologist, Dr Smiley:  Cleared at intermediate risk from CV standpoint in light of recent testing   /Poly Bryant RN        Anesthesia Plan  Type of Anesthesia, risks & benefits discussed:  Anesthesia Type:  general  Patient's Preference:   Intra-op Monitoring Plan: standard ASA monitors  Intra-op Monitoring Plan Comments:   Post Op Pain Control Plan: multimodal analgesia and per primary service following discharge from PACU  Post Op Pain Control Plan Comments:   Induction:   IV  Beta Blocker:  Patient is on a Beta-Blocker and has received one dose within the past 24 hours (No further documentation required).       Informed Consent: Patient understands risks and agrees with Anesthesia plan.  Questions answered. Anesthesia consent signed with patient.  ASA Score: 3     Day of Surgery Review of History & Physical:    H&P update referred to the surgeon.         Ready For Surgery From Anesthesia Perspective.

## 2019-01-02 NOTE — PRE ADMISSION SCREENING
"Anesthesia Assessment: Preoperative EQUATION    Planned Procedure: Procedure(s) (LRB):  VATS, WITH WEDGE RESECTION, LUNG (Right)  VATS, WITH LOBECTOMY (Right)  MEDIASTINOSCOPY (Right)  Requested Anesthesia Type:General  Surgeon: Celso Jewell MD  Service: Thoracic  Known or anticipated Date of Surgery:1/16/2019    Surgeon notes: reviewed and Malignant neoplasm of lung, unspecified laterality, unspecified part of lunf  Previous anesthesia records:Not available and No anesthesia records found in [employer] system. Patient has had several surgeries done at [facility] facilities.    Last PCP note: 3-6 months ago , within [facility] , focused visit   Subspecialty notes: Cardiology: General, Hematology/Oncology, Pulmonary  Tests already available:  Results have been reviewed. Labs on 12/14/18-CMP/CBC/BMP/11/27/18-PT-INR/CBC/CXR-11/27/18/ EKG-9/7/18 (storage only) & 8/20/18/Pharm Stress Test-9/7/18              Plan:    Testing:  T&S        Patient  has previously scheduled Medical Appointment:Appointment on 1/2/19,    Navigation: Tests Scheduled. Lab-T&S on ?             Consults scheduled. POC on ? & Requested "CLearance" from [facility] PCP-Dr. Snell & [facility] Cards-Dr. Alicea-PENDING             Results will be tracked by Preop Clinic.                Frances Gutiérrez RN  1/2/19  "

## 2019-01-02 NOTE — TELEPHONE ENCOUNTER
"----- Message from Frances Gutiérrez RN sent at 1/2/2019 11:29 AM CST -----  Patient is having  a VATS,With Wedge Resection, Lung/ VATS, with Lobectomy /Mediastinoscopy on 1/16/19, with Dr. Jewell. Requesting a "Clearance" from you prior to patients' surgery date. Patient was recently seen by you on 10/25/18. Await your reply. Thank you.  Sincerely, Frances Gutiérrez RN / [facility]-[phone]    "

## 2019-01-02 NOTE — TELEPHONE ENCOUNTER
----- Message from Frances Gutiérrez RN sent at 1/2/2019 11:44 AM CST -----  Isha Reyes, Patient will need a POC & Lab-T&S(ordered), prior to surgery date (1/16/19). Patient is having a VATS, with Wedge Lung Resection & Lobectomy with Mediastinoscopy  with .  Thank you. PJ

## 2019-01-03 ENCOUNTER — TELEPHONE (OUTPATIENT)
Dept: FAMILY MEDICINE | Facility: CLINIC | Age: 74
End: 2019-01-03

## 2019-01-03 NOTE — TELEPHONE ENCOUNTER
----- Message from Harpreet Lovelace sent at 1/2/2019  1:49 PM CST -----  Contact: Cici mcpherson/[phone]  The patient daughter would like to speak to the staff regarding a clearance appt. The patient is due to have surgery on 01/16.        Thank you

## 2019-01-04 ENCOUNTER — TELEPHONE (OUTPATIENT)
Dept: CARDIOLOGY | Facility: CLINIC | Age: 74
End: 2019-01-04

## 2019-01-04 ENCOUNTER — OFFICE VISIT (OUTPATIENT)
Dept: FAMILY MEDICINE | Facility: CLINIC | Age: 74
End: 2019-01-04
Payer: MEDICARE

## 2019-01-04 ENCOUNTER — HOSPITAL ENCOUNTER (OUTPATIENT)
Dept: RADIOLOGY | Facility: HOSPITAL | Age: 74
Discharge: HOME | End: 2019-01-04
Attending: NURSE PRACTITIONER
Payer: MEDICARE

## 2019-01-04 VITALS
HEIGHT: 60 IN | HEART RATE: 77 BPM | OXYGEN SATURATION: 95 % | TEMPERATURE: 99 F | SYSTOLIC BLOOD PRESSURE: 108 MMHG | WEIGHT: 88.75 LBS | BODY MASS INDEX: 17.43 KG/M2 | DIASTOLIC BLOOD PRESSURE: 44 MMHG

## 2019-01-04 DIAGNOSIS — R91.8 LUNG MASS: ICD-10-CM

## 2019-01-04 DIAGNOSIS — Z01.818 PRE-OPERATIVE CLEARANCE: Primary | ICD-10-CM

## 2019-01-04 DIAGNOSIS — R91.1 PULMONARY NODULE: ICD-10-CM

## 2019-01-04 DIAGNOSIS — C34.91 NON-SMALL CELL CANCER OF RIGHT LUNG: ICD-10-CM

## 2019-01-04 DIAGNOSIS — N18.30 CKD (CHRONIC KIDNEY DISEASE) STAGE 3, GFR 30-59 ML/MIN: ICD-10-CM

## 2019-01-04 DIAGNOSIS — Z01.818 PRE-OPERATIVE CLEARANCE: ICD-10-CM

## 2019-01-04 PROCEDURE — 93005 ELECTROCARDIOGRAM TRACING: CPT | Mod: S$GLB,,, | Performed by: NURSE PRACTITIONER

## 2019-01-04 PROCEDURE — 71046 XR CHEST PA AND LATERAL: ICD-10-PCS | Mod: 26,,, | Performed by: RADIOLOGY

## 2019-01-04 PROCEDURE — 99999 PR PBB SHADOW E&M-EST. PATIENT-LVL IV: ICD-10-PCS | Mod: PBBFAC,,, | Performed by: NURSE PRACTITIONER

## 2019-01-04 PROCEDURE — 93010 EKG 12-LEAD: ICD-10-PCS | Mod: S$GLB,,, | Performed by: INTERNAL MEDICINE

## 2019-01-04 PROCEDURE — 3078F DIAST BP <80 MM HG: CPT | Mod: CPTII,S$GLB,, | Performed by: NURSE PRACTITIONER

## 2019-01-04 PROCEDURE — 71046 X-RAY EXAM CHEST 2 VIEWS: CPT | Mod: 26,,, | Performed by: RADIOLOGY

## 2019-01-04 PROCEDURE — 3078F PR MOST RECENT DIASTOLIC BLOOD PRESSURE < 80 MM HG: ICD-10-PCS | Mod: CPTII,S$GLB,, | Performed by: NURSE PRACTITIONER

## 2019-01-04 PROCEDURE — 1101F PT FALLS ASSESS-DOCD LE1/YR: CPT | Mod: CPTII,S$GLB,, | Performed by: NURSE PRACTITIONER

## 2019-01-04 PROCEDURE — 3074F PR MOST RECENT SYSTOLIC BLOOD PRESSURE < 130 MM HG: ICD-10-PCS | Mod: CPTII,S$GLB,, | Performed by: NURSE PRACTITIONER

## 2019-01-04 PROCEDURE — 99214 OFFICE O/P EST MOD 30 MIN: CPT | Mod: S$GLB,,, | Performed by: NURSE PRACTITIONER

## 2019-01-04 PROCEDURE — 3074F SYST BP LT 130 MM HG: CPT | Mod: CPTII,S$GLB,, | Performed by: NURSE PRACTITIONER

## 2019-01-04 PROCEDURE — 1101F PR PT FALLS ASSESS DOC 0-1 FALLS W/OUT INJ PAST YR: ICD-10-PCS | Mod: CPTII,S$GLB,, | Performed by: NURSE PRACTITIONER

## 2019-01-04 PROCEDURE — 71046 X-RAY EXAM CHEST 2 VIEWS: CPT | Mod: TC,FY,PO

## 2019-01-04 PROCEDURE — 99214 PR OFFICE/OUTPT VISIT, EST, LEVL IV, 30-39 MIN: ICD-10-PCS | Mod: S$GLB,,, | Performed by: NURSE PRACTITIONER

## 2019-01-04 PROCEDURE — 93010 ELECTROCARDIOGRAM REPORT: CPT | Mod: S$GLB,,, | Performed by: INTERNAL MEDICINE

## 2019-01-04 PROCEDURE — 99499 UNLISTED E&M SERVICE: CPT | Mod: S$GLB,,, | Performed by: NURSE PRACTITIONER

## 2019-01-04 PROCEDURE — 99999 PR PBB SHADOW E&M-EST. PATIENT-LVL IV: CPT | Mod: PBBFAC,,, | Performed by: NURSE PRACTITIONER

## 2019-01-04 PROCEDURE — 93005 EKG 12-LEAD: ICD-10-PCS | Mod: S$GLB,,, | Performed by: NURSE PRACTITIONER

## 2019-01-04 PROCEDURE — 99499 RISK ADDL DX/OHS AUDIT: ICD-10-PCS | Mod: S$GLB,,, | Performed by: NURSE PRACTITIONER

## 2019-01-04 NOTE — TELEPHONE ENCOUNTER
"----- Message from Frances Gutiérrez RN sent at 1/2/2019 11:21 AM CST -----  Patient is having  a VATS,With Wedge Resection, Lung/ VATS, with Lobectomy /Mediastinoscopy on 1/16/19, with Dr. Jewell. Requesting a "Clearance" from you prior to patients' surgery date. Patient was recently seen by you on 9/17/18. Await your reply. Thank you.  Sincerely, Frances Gutiérrez RN / [facility]-[phone]  "

## 2019-01-04 NOTE — PROGRESS NOTES
Subjective:       Patient ID: Ariadna Villegas is a 73 y.o. female.    Chief Complaint: Pre-op Exam     Subjective:    Ariadna Villegas is a 73 y.o. female who presents to the office today for a preoperative consultation at the request of surgeon Celso Jewell who plans on performing VATS,With Wedge Resection, Lung/ VATS, with Lobectomy /Mediastinoscopy  on . This consultation is requested for the specific conditions prompting preoperative evaluation (i.e. because of potential affect on operative risk): Discussed by surgery team. Planned anesthesia: general. The patient has the following known anesthesia issues: none. Patients bleeding risk: no recent abnormal bleeding. Patient does not have objections to receiving blood products if needed.    The following portions of the patient's history were reviewed and updated as appropriate: allergies, current medications, past family history, past medical history, past social history, past surgical history and problem list.          Past Medical History:   Diagnosis Date    Arthritis     neck and back    COPD (chronic obstructive pulmonary disease)     Malignant hypertension     Non-small cell lung cancer 2018    Osteoporosis, postmenopausal     Tobacco use disorder      Past Surgical History:   Procedure Laterality Date    Biopsy-Lung N/A 2018    [facility]     x3      HYSTERECTOMY      PARTIAL HYSTERECTOMY      , after her third     SALPINGOOPHORECTOMY      , for a ovarian cyst     Social History     Socioeconomic History    Marital status:      Spouse name: Not on file    Number of children: Not on file    Years of education: Not on file    Highest education level: Not on file   Social Needs    Financial resource strain: Not on file    Food insecurity - worry: Not on file    Food insecurity - inability: Not on file    Transportation needs - medical:  Not on file    Transportation needs - non-medical: Not on file   Occupational History    Occupation: Retired   Tobacco Use    Smoking status: Former Smoker     Packs/day: 0.15     Years: 60.00     Pack years: 9.00     Types: Cigarettes    Smokeless tobacco: Never Used    Tobacco comment: quit x 2-3 wks. ago   Substance and Sexual Activity    Alcohol use: Yes     Comment: socially    Drug use: No    Sexual activity: No     Partners: Male     Comment:    Other Topics Concern    Not on file   Social History Narrative    Not on file     Family History   Problem Relation Age of Onset    Heart disease Mother     Diabetes Mother     Stroke Father     Heart disease Sister     Anesthesia problems Neg Hx      Review of Systems   Constitutional: Negative for chills, diaphoresis, fatigue and fever.   HENT: Negative for congestion, nosebleeds, postnasal drip, rhinorrhea, sinus pressure and sneezing.    Respiratory: Negative for cough, chest tightness, shortness of breath and wheezing.    Cardiovascular: Negative for chest pain, palpitations and leg swelling.   Gastrointestinal: Negative for abdominal pain, constipation, diarrhea, nausea and vomiting.   Genitourinary: Negative for dysuria, vaginal discharge and vaginal pain.   Musculoskeletal: Negative for arthralgias, back pain and myalgias.   Skin: Negative for color change and rash.   Neurological: Negative for dizziness, weakness, light-headedness and headaches.       Objective:      Physical Exam   Constitutional: She is oriented to person, place, and time. Vital signs are normal. She appears well-developed and well-nourished.   HENT:   Head: Normocephalic and atraumatic.   Right Ear: External ear normal.   Left Ear: External ear normal.   Nose: Nose normal.   Mouth/Throat: Oropharynx is clear and moist. No oropharyngeal exudate.   Eyes: EOM are normal. Pupils are equal, round, and reactive to light.   Cardiovascular: Normal rate, regular rhythm and  normal heart sounds.   Pulmonary/Chest: Effort normal and breath sounds normal.   Neurological: She is alert and oriented to person, place, and time.   Skin: Skin is warm, dry and intact.   Psychiatric: She has a normal mood and affect.       Assessment:       1. Pre-operative clearance    2. CKD (chronic kidney disease) stage 3, GFR 30-59 ml/min    3. Non-small cell cancer of right lung        Plan:       Ariadna was seen today for pre-op exam.    Diagnoses and all orders for this visit:    Pre-operative clearance  -     CBC auto differential; Future  -     Comprehensive metabolic panel; Future  -     X-Ray Chest PA And Lateral; Future  -     IN OFFICE EKG 12-LEAD (to Muse)    CKD (chronic kidney disease) stage 3, GFR 30-59 ml/min  The current medical regimen is effective;  continue present plan and medications.    Non-small cell cancer of right lung  The current medical regimen is effective;  continue present plan and medications.    Lung mass  The current medical regimen is effective;  continue present plan and medications.    Pulmonary nodule  The current medical regimen is effective;  continue present plan and medications.      1. STOP TAKING ASPIRIN, NSAIDS, AND ALL OTHER BLOOD THINNERS 7 DAYS PRIOR TO SURGERY     Labs are pending      I have evaluated the patient for risks associated with the planned anesthesia and the procedure to be performed and have found the patient an intermediate risk  candidate for surgery

## 2019-01-07 NOTE — PROGRESS NOTES
Patient, Ariadna Villegas (MRN #[id number]), presented with a recent Estimated Glumerular Filtration Rate (EGFR) between 15 and 29 consistent with the definition of chronic kidney disease stage 4 (ICD10 - N18.4).    eGFR if non    Date Value Ref Range Status   01/04/2019 31.5 (A) >60 mL/min/1.73 m^2 Final     Comment:     Calculation used to obtain the estimated glomerular filtration  rate (eGFR) is the CKD-EPI equation.          The patient's chronic kidney disease stage 4 was monitored, evaluated, addressed and/or treated. This addendum to the medical record is made on 01/07/2019.

## 2019-01-10 ENCOUNTER — HOSPITAL ENCOUNTER (OUTPATIENT)
Dept: PREADMISSION TESTING | Facility: HOSPITAL | Age: 74
Discharge: HOME | End: 2019-01-10
Attending: ANESTHESIOLOGY
Payer: MEDICARE

## 2019-01-10 VITALS
TEMPERATURE: 99 F | HEIGHT: 60 IN | OXYGEN SATURATION: 99 % | SYSTOLIC BLOOD PRESSURE: 115 MMHG | DIASTOLIC BLOOD PRESSURE: 56 MMHG | BODY MASS INDEX: 16.3 KG/M2 | HEART RATE: 69 BPM | WEIGHT: 83 LBS

## 2019-01-10 NOTE — DISCHARGE INSTRUCTIONS
Your surgery has been scheduled for:__________________________________________    You should report to:  ____[facility], located on the [place] side of the first floor of the           [facility] ([phone])  ____[facility], located on the [facility] side of the            Second floor of the [facility] [phone])  ____3rd Floor [facility] located on the [facility] side of the [facility] [phone]  Please Note   - Tell your doctor if you take Aspirin, products containing Aspirin, herbal medications  or blood thinners, such as Coumadin, Ticlid, or Plavix.  (Consult your provider regarding holding or stopping before surgery).  - Arrange for someone to drive you home following surgery.  You will not be allowed to leave the surgical facility alone or drive yourself home following sedation and anesthesia.  Before Surgery  - Stop taking all herbal medications 14days prior to surgery  - No Motrin/Advil (Ibuprofen) 7 days before surgery  - No Aleve (Naproxen) 7 days before surgery  - Stop Taking Asprin, products containing Asprin _____days before surgery  - Stop taking blood thinners_______days before surgery  - No Goody's/BC  Powder 7 days before surgery  - Refrain from drinking alcoholic beverages for 24hours before and after surgery  - Stop or limit smoking _________days before surgery  - You may take Tylenol for pain    Night before Surgery  NOTHING TO EAT OR DRINK AFTER MIDNIGHT OR FOLLOW SURGEON'S INSTRUCTIONS  - Take a shower or bath (shower is recommended).  Bathe with Hibiclens soap or an antibacterial soap from the neck down.  If not supplied by your surgeon, hibiclens soap will need to be purchased over the counter in pharmacy.  Rinse soap off thoroughly.  - Shampoo your hair with your regular shampoo  The Day of Surgery  ·  If you are told to take medication on the morning of surgery, it may be taken with  a sip of water.   - Take another bath or shower with hibiclens or any antibacterial soap, to reduce the chance of infection.  - Take heart and blood pressure medications with a small sip of water, as advised by the perioperative team.  - Do not take fluid pills  - You may brush your teeth and rinse your mouth, but do not swall any additional water.   - Do not apply perfumes, powder, body lotions or deodorant on the day of surgery.  - Nail polish should be removed.  - Do not wear makeup or moisturizer  - Wear comfortable clothes, such as a button front shirt and loose fitting pants.  - Leave all jewelry, including body piercings, and valuables at home.    - Bring any devices you will neeed after surgery such as crutches or canes.  - If you have sleep apnea, please bring your CPAP machine  In the event that your physical condition changes including the onset of a cold or respiratory illness, or if you have to delay or cancel your surgery, please notify your surgeon.      Anesthesia: General Anesthesia  Youre due to have surgery. During surgery, youll be given medication called anesthesia. (It is also called anesthetic.) This will keep you comfortable and pain-free. Your anesthesia provider will use general anesthesia. This sheet tells you more about it.  What is general anesthesia?     You are watched continuously during your procedure by the anesthesia provider   General anesthesia puts you into a state like deep sleep. It goes into the bloodstream (IV anesthetics), into the lungs (gas anesthetics), or both. You feel nothing during the procedure. You will not remember it. During the procedure, the anesthesia provider monitors you continuously. He or she checks your heart rate and rhythm, blood pressure, breathing, and blood oxygen.  · IV Anesthetics. IV anesthetics are given through an IV line in your arm. Theyre often given first. This is so you are asleep before a gas anesthetic is started. Some kinds of IV  anesthetics relieve pain. Others relax you. Your doctor will decide which kind is best in your case.  · Gas Anesthetics. Gas anesthetics are breathed into the lungs. They are often used to keep you asleep. They can be given through a facemask or a tube placed in your larynx or trachea (breathing tube).  ? If you have a facemask, your anesthesia provider will most likely place it over your nose and mouth while youre still awake. Youll breathe oxygen through the mask as your IV anesthetic is started. Gas anesthetic may be added through the mask.  ? If you have a tube in the larynx or trachea, it will be inserted into your throat after youre asleep.  Anesthesia tools and medications  You will likely have:  · IV anesthetics. These are put into an IV line into your bloodstream.  · Gas anesthetics. You breathe these anesthetics into your lungs, where they pass into your bloodstream.  · Pulse oximeter. This is a small clip that is attached to the end of your finger. This measures your blood oxygen level.  · Electrocardiography leads (electrodes). These are small sticky pads that are placed on your chest. They record your heart rate and rhythm.  · Blood pressure cuff. This reads your blood pressure.  Risks and possible complications  General anesthesia has some risks. These include:  · Breathing problems  · Nausea and vomiting  · Sore throat or hoarseness (usually temporary)  · Allergic reaction to the anesthetic  · Irregular heartbeat (rare)  · Cardiac arrest (rare)   Anesthesia safety  · Follow all instructions you are given for how long not to eat or drink before your procedure.  · Be sure your doctor knows what medications and drugs you take. This includes over-the-counter medications, herbs, supplements, alcohol or other drugs. You will be asked when those were last taken.  · Have an adult family member or friend drive you home after the procedure.  · For the first 24 hours after your surgery:  ? Do not drive or use  heavy equipment.  ? Have a trusted family member or spouse make important decisions or sign documents.  ? Avoid alcohol.  ? Have a responsible adult stay with you. He or she can watch for problems and help keep you safe.  Date Last Reviewed: 10/16/2014  © 4058-5306 [employer]. [address] [place] [address]. All rights reserved. This information is not intended as a substitute for professional medical care. Always follow your healthcare professional's instructions.

## 2019-01-15 ENCOUNTER — TELEPHONE (OUTPATIENT)
Dept: CARDIOTHORACIC SURGERY | Facility: CLINIC | Age: 74
End: 2019-01-15

## 2019-01-16 ENCOUNTER — HOSPITAL ENCOUNTER (INPATIENT)
Facility: HOSPITAL | Age: 74
LOS: 1 days | Discharge: HOME | DRG: 165 | End: 2019-01-17
Attending: THORACIC SURGERY (CARDIOTHORACIC VASCULAR SURGERY) | Admitting: THORACIC SURGERY (CARDIOTHORACIC VASCULAR SURGERY)
Payer: MEDICARE

## 2019-01-16 ENCOUNTER — ANESTHESIA (OUTPATIENT)
Dept: SURGERY | Facility: HOSPITAL | Age: 74
DRG: 165 | End: 2019-01-16
Payer: MEDICARE

## 2019-01-16 DIAGNOSIS — C34.90 SQUAMOUS CELL LUNG CANCER: Primary | ICD-10-CM

## 2019-01-16 LAB
ABO + RH BLD: NORMAL
BLD GP AB SCN CELLS X3 SERPL QL: NORMAL

## 2019-01-16 PROCEDURE — 32666 PR THORACOSCOPY W/WEDGE RESECT: ICD-10-PCS | Mod: RT,,, | Performed by: THORACIC SURGERY (CARDIOTHORACIC VASCULAR SURGERY)

## 2019-01-16 PROCEDURE — 86920 COMPATIBILITY TEST SPIN: CPT

## 2019-01-16 PROCEDURE — 36620 INSERTION CATHETER ARTERY: CPT | Mod: 59,,, | Performed by: ANESTHESIOLOGY

## 2019-01-16 PROCEDURE — 63600175 PHARM REV CODE 636 W HCPCS: Performed by: ANESTHESIOLOGY

## 2019-01-16 PROCEDURE — 25000003 PHARM REV CODE 250: Performed by: PHYSICIAN ASSISTANT

## 2019-01-16 PROCEDURE — 25000242 PHARM REV CODE 250 ALT 637 W/ HCPCS: Performed by: GENERAL PRACTICE

## 2019-01-16 PROCEDURE — 71000033 HC RECOVERY, INTIAL HOUR: Performed by: THORACIC SURGERY (CARDIOTHORACIC VASCULAR SURGERY)

## 2019-01-16 PROCEDURE — 99900035 HC TECH TIME PER 15 MIN (STAT)

## 2019-01-16 PROCEDURE — 25000003 PHARM REV CODE 250

## 2019-01-16 PROCEDURE — 27201423 OPTIME MED/SURG SUP & DEVICES STERILE SUPPLY: Performed by: THORACIC SURGERY (CARDIOTHORACIC VASCULAR SURGERY)

## 2019-01-16 PROCEDURE — 88331 PATH CONSLTJ SURG 1 BLK 1SPC: CPT | Mod: 26,,, | Performed by: PATHOLOGY

## 2019-01-16 PROCEDURE — 36000710: Performed by: THORACIC SURGERY (CARDIOTHORACIC VASCULAR SURGERY)

## 2019-01-16 PROCEDURE — 88305 TISSUE EXAM BY PATHOLOGIST: CPT | Mod: 26,,, | Performed by: PATHOLOGY

## 2019-01-16 PROCEDURE — C9290 INJ, BUPIVACAINE LIPOSOME: HCPCS | Performed by: THORACIC SURGERY (CARDIOTHORACIC VASCULAR SURGERY)

## 2019-01-16 PROCEDURE — 94664 DEMO&/EVAL PT USE INHALER: CPT

## 2019-01-16 PROCEDURE — C1729 CATH, DRAINAGE: HCPCS | Performed by: THORACIC SURGERY (CARDIOTHORACIC VASCULAR SURGERY)

## 2019-01-16 PROCEDURE — 63600175 PHARM REV CODE 636 W HCPCS: Performed by: GENERAL PRACTICE

## 2019-01-16 PROCEDURE — 88331 TISSUE SPECIMEN TO PATHOLOGY - SURGERY: ICD-10-PCS | Mod: 26,,, | Performed by: PATHOLOGY

## 2019-01-16 PROCEDURE — 25000003 PHARM REV CODE 250: Performed by: THORACIC SURGERY (CARDIOTHORACIC VASCULAR SURGERY)

## 2019-01-16 PROCEDURE — D9220A PRA ANESTHESIA: ICD-10-PCS | Mod: ,,, | Performed by: ANESTHESIOLOGY

## 2019-01-16 PROCEDURE — 37000009 HC ANESTHESIA EA ADD 15 MINS: Performed by: THORACIC SURGERY (CARDIOTHORACIC VASCULAR SURGERY)

## 2019-01-16 PROCEDURE — 63600175 PHARM REV CODE 636 W HCPCS: Performed by: PHYSICIAN ASSISTANT

## 2019-01-16 PROCEDURE — 25000003 PHARM REV CODE 250: Performed by: ANESTHESIOLOGY

## 2019-01-16 PROCEDURE — 27000221 HC OXYGEN, UP TO 24 HOURS

## 2019-01-16 PROCEDURE — 94640 AIRWAY INHALATION TREATMENT: CPT

## 2019-01-16 PROCEDURE — 36620 ARTERIAL: ICD-10-PCS | Mod: 59,,, | Performed by: ANESTHESIOLOGY

## 2019-01-16 PROCEDURE — 63600175 PHARM REV CODE 636 W HCPCS: Performed by: THORACIC SURGERY (CARDIOTHORACIC VASCULAR SURGERY)

## 2019-01-16 PROCEDURE — 86850 RBC ANTIBODY SCREEN: CPT

## 2019-01-16 PROCEDURE — 71000039 HC RECOVERY, EACH ADD'L HOUR: Performed by: THORACIC SURGERY (CARDIOTHORACIC VASCULAR SURGERY)

## 2019-01-16 PROCEDURE — 63600175 PHARM REV CODE 636 W HCPCS: Performed by: STUDENT IN AN ORGANIZED HEALTH CARE EDUCATION/TRAINING PROGRAM

## 2019-01-16 PROCEDURE — 32674 PR THORACOSCOPY LYMPH NODE EXC: ICD-10-PCS | Mod: ,,, | Performed by: THORACIC SURGERY (CARDIOTHORACIC VASCULAR SURGERY)

## 2019-01-16 PROCEDURE — 25000003 PHARM REV CODE 250: Performed by: GENERAL PRACTICE

## 2019-01-16 PROCEDURE — D9220A PRA ANESTHESIA: Mod: ,,, | Performed by: ANESTHESIOLOGY

## 2019-01-16 PROCEDURE — 32674 THORACOSCOPY LYMPH NODE EXC: CPT | Mod: ,,, | Performed by: THORACIC SURGERY (CARDIOTHORACIC VASCULAR SURGERY)

## 2019-01-16 PROCEDURE — 32666 THORACOSCOPY W/WEDGE RESECT: CPT | Mod: RT,,, | Performed by: THORACIC SURGERY (CARDIOTHORACIC VASCULAR SURGERY)

## 2019-01-16 PROCEDURE — 94761 N-INVAS EAR/PLS OXIMETRY MLT: CPT

## 2019-01-16 PROCEDURE — 37000008 HC ANESTHESIA 1ST 15 MINUTES: Performed by: THORACIC SURGERY (CARDIOTHORACIC VASCULAR SURGERY)

## 2019-01-16 PROCEDURE — 88305 TISSUE SPECIMEN TO PATHOLOGY - SURGERY: ICD-10-PCS | Mod: 26,,, | Performed by: PATHOLOGY

## 2019-01-16 PROCEDURE — 20600001 HC STEP DOWN PRIVATE ROOM

## 2019-01-16 PROCEDURE — 36000711: Performed by: THORACIC SURGERY (CARDIOTHORACIC VASCULAR SURGERY)

## 2019-01-16 PROCEDURE — 88305 TISSUE EXAM BY PATHOLOGIST: CPT | Performed by: PATHOLOGY

## 2019-01-16 PROCEDURE — 88307 TISSUE SPECIMEN TO PATHOLOGY - SURGERY: ICD-10-PCS | Mod: 26,,, | Performed by: PATHOLOGY

## 2019-01-16 PROCEDURE — 88307 TISSUE EXAM BY PATHOLOGIST: CPT | Mod: 26,,, | Performed by: PATHOLOGY

## 2019-01-16 PROCEDURE — 27201040 HC RC 50 FILTER

## 2019-01-16 RX ORDER — ACETAMINOPHEN 500 MG
500 TABLET ORAL EVERY 6 HOURS
Status: DISCONTINUED | OUTPATIENT
Start: 2019-01-16 | End: 2019-01-17 | Stop reason: HOSPADM

## 2019-01-16 RX ORDER — DEXTROSE MONOHYDRATE, SODIUM CHLORIDE, AND POTASSIUM CHLORIDE 50; 1.49; 4.5 G/1000ML; G/1000ML; G/1000ML
INJECTION, SOLUTION INTRAVENOUS CONTINUOUS
Status: DISCONTINUED | OUTPATIENT
Start: 2019-01-16 | End: 2019-01-16

## 2019-01-16 RX ORDER — ACETAMINOPHEN 10 MG/ML
1000 INJECTION, SOLUTION INTRAVENOUS EVERY 8 HOURS
Status: DISCONTINUED | OUTPATIENT
Start: 2019-01-16 | End: 2019-01-16 | Stop reason: ALTCHOICE

## 2019-01-16 RX ORDER — LIDOCAINE HYDROCHLORIDE 10 MG/ML
1 INJECTION, SOLUTION EPIDURAL; INFILTRATION; INTRACAUDAL; PERINEURAL ONCE
Status: DISCONTINUED | OUTPATIENT
Start: 2019-01-16 | End: 2019-01-16

## 2019-01-16 RX ORDER — FENTANYL CITRATE 50 UG/ML
INJECTION, SOLUTION INTRAMUSCULAR; INTRAVENOUS
Status: DISCONTINUED | OUTPATIENT
Start: 2019-01-16 | End: 2019-01-16

## 2019-01-16 RX ORDER — HYDROMORPHONE HYDROCHLORIDE 1 MG/ML
0.5 INJECTION, SOLUTION INTRAMUSCULAR; INTRAVENOUS; SUBCUTANEOUS ONCE
Status: COMPLETED | OUTPATIENT
Start: 2019-01-16 | End: 2019-01-16

## 2019-01-16 RX ORDER — ROSUVASTATIN CALCIUM 20 MG/1
20 TABLET, COATED ORAL DAILY
Status: DISCONTINUED | OUTPATIENT
Start: 2019-01-17 | End: 2019-01-17 | Stop reason: HOSPADM

## 2019-01-16 RX ORDER — PHENYLEPHRINE HYDROCHLORIDE 10 MG/ML
INJECTION INTRAVENOUS
Status: DISCONTINUED | OUTPATIENT
Start: 2019-01-16 | End: 2019-01-16

## 2019-01-16 RX ORDER — KETAMINE HCL IN 0.9 % NACL 50 MG/5 ML
SYRINGE (ML) INTRAVENOUS
Status: DISCONTINUED | OUTPATIENT
Start: 2019-01-16 | End: 2019-01-16

## 2019-01-16 RX ORDER — ROCURONIUM BROMIDE 10 MG/ML
INJECTION, SOLUTION INTRAVENOUS
Status: DISCONTINUED | OUTPATIENT
Start: 2019-01-16 | End: 2019-01-16

## 2019-01-16 RX ORDER — ENOXAPARIN SODIUM 100 MG/ML
30 INJECTION SUBCUTANEOUS DAILY
Status: DISCONTINUED | OUTPATIENT
Start: 2019-01-17 | End: 2019-01-17 | Stop reason: HOSPADM

## 2019-01-16 RX ORDER — HYDROMORPHONE HYDROCHLORIDE 1 MG/ML
0.5 INJECTION, SOLUTION INTRAMUSCULAR; INTRAVENOUS; SUBCUTANEOUS EVERY 4 HOURS PRN
Status: DISCONTINUED | OUTPATIENT
Start: 2019-01-16 | End: 2019-01-17

## 2019-01-16 RX ORDER — FENTANYL CITRATE 50 UG/ML
25 INJECTION, SOLUTION INTRAMUSCULAR; INTRAVENOUS EVERY 5 MIN PRN
Status: COMPLETED | OUTPATIENT
Start: 2019-01-16 | End: 2019-01-16

## 2019-01-16 RX ORDER — OXYCODONE HYDROCHLORIDE 5 MG/1
TABLET ORAL
Status: COMPLETED
Start: 2019-01-16 | End: 2019-01-16

## 2019-01-16 RX ORDER — SODIUM CHLORIDE 0.9 % (FLUSH) 0.9 %
3 SYRINGE (ML) INJECTION
Status: DISCONTINUED | OUTPATIENT
Start: 2019-01-16 | End: 2019-01-17 | Stop reason: HOSPADM

## 2019-01-16 RX ORDER — PROPOFOL 10 MG/ML
VIAL (ML) INTRAVENOUS
Status: DISCONTINUED | OUTPATIENT
Start: 2019-01-16 | End: 2019-01-16

## 2019-01-16 RX ORDER — MIDAZOLAM HYDROCHLORIDE 1 MG/ML
INJECTION, SOLUTION INTRAMUSCULAR; INTRAVENOUS
Status: DISCONTINUED | OUTPATIENT
Start: 2019-01-16 | End: 2019-01-16

## 2019-01-16 RX ORDER — GLYCOPYRROLATE 0.2 MG/ML
INJECTION INTRAMUSCULAR; INTRAVENOUS
Status: DISCONTINUED | OUTPATIENT
Start: 2019-01-16 | End: 2019-01-16

## 2019-01-16 RX ORDER — ACETAMINOPHEN 10 MG/ML
INJECTION, SOLUTION INTRAVENOUS
Status: DISCONTINUED | OUTPATIENT
Start: 2019-01-16 | End: 2019-01-16

## 2019-01-16 RX ORDER — MORPHINE SULFATE 2 MG/ML
2 INJECTION, SOLUTION INTRAMUSCULAR; INTRAVENOUS EVERY 4 HOURS PRN
Status: DISCONTINUED | OUTPATIENT
Start: 2019-01-16 | End: 2019-01-17

## 2019-01-16 RX ORDER — HYDROMORPHONE HYDROCHLORIDE 1 MG/ML
0.2 INJECTION, SOLUTION INTRAMUSCULAR; INTRAVENOUS; SUBCUTANEOUS EVERY 5 MIN PRN
Status: DISCONTINUED | OUTPATIENT
Start: 2019-01-16 | End: 2019-01-16 | Stop reason: HOSPADM

## 2019-01-16 RX ORDER — ACETAMINOPHEN 500 MG
500 TABLET ORAL EVERY 6 HOURS
Status: DISCONTINUED | OUTPATIENT
Start: 2019-01-17 | End: 2019-01-16

## 2019-01-16 RX ORDER — ASPIRIN 81 MG/1
81 TABLET ORAL DAILY
Status: DISCONTINUED | OUTPATIENT
Start: 2019-01-17 | End: 2019-01-17 | Stop reason: HOSPADM

## 2019-01-16 RX ORDER — IPRATROPIUM BROMIDE AND ALBUTEROL SULFATE 2.5; .5 MG/3ML; MG/3ML
3 SOLUTION RESPIRATORY (INHALATION) EVERY 4 HOURS
Status: DISCONTINUED | OUTPATIENT
Start: 2019-01-16 | End: 2019-01-17 | Stop reason: HOSPADM

## 2019-01-16 RX ORDER — SODIUM CHLORIDE 9 MG/ML
INJECTION, SOLUTION INTRAVENOUS CONTINUOUS
Status: DISCONTINUED | OUTPATIENT
Start: 2019-01-16 | End: 2019-01-16

## 2019-01-16 RX ORDER — ESMOLOL HYDROCHLORIDE 10 MG/ML
INJECTION INTRAVENOUS
Status: DISCONTINUED | OUTPATIENT
Start: 2019-01-16 | End: 2019-01-16

## 2019-01-16 RX ORDER — METOPROLOL SUCCINATE 50 MG/1
50 TABLET, EXTENDED RELEASE ORAL DAILY
Status: DISCONTINUED | OUTPATIENT
Start: 2019-01-17 | End: 2019-01-17 | Stop reason: HOSPADM

## 2019-01-16 RX ORDER — ONDANSETRON 8 MG/1
8 TABLET, ORALLY DISINTEGRATING ORAL EVERY 8 HOURS PRN
Status: DISCONTINUED | OUTPATIENT
Start: 2019-01-16 | End: 2019-01-17 | Stop reason: HOSPADM

## 2019-01-16 RX ORDER — NEOSTIGMINE METHYLSULFATE 1 MG/ML
INJECTION, SOLUTION INTRAVENOUS
Status: DISCONTINUED | OUTPATIENT
Start: 2019-01-16 | End: 2019-01-16

## 2019-01-16 RX ORDER — LIDOCAINE HCL/PF 100 MG/5ML
SYRINGE (ML) INTRAVENOUS
Status: DISCONTINUED | OUTPATIENT
Start: 2019-01-16 | End: 2019-01-16

## 2019-01-16 RX ORDER — ENOXAPARIN SODIUM 100 MG/ML
30 INJECTION SUBCUTANEOUS EVERY 24 HOURS
Status: DISCONTINUED | OUTPATIENT
Start: 2019-01-16 | End: 2019-01-16

## 2019-01-16 RX ORDER — SODIUM CHLORIDE 0.9 % (FLUSH) 0.9 %
3 SYRINGE (ML) INJECTION
Status: DISCONTINUED | OUTPATIENT
Start: 2019-01-16 | End: 2019-01-16

## 2019-01-16 RX ORDER — ONDANSETRON 2 MG/ML
INJECTION INTRAMUSCULAR; INTRAVENOUS
Status: DISCONTINUED | OUTPATIENT
Start: 2019-01-16 | End: 2019-01-16

## 2019-01-16 RX ORDER — OXYCODONE HYDROCHLORIDE 10 MG/1
10 TABLET ORAL EVERY 4 HOURS PRN
Status: DISCONTINUED | OUTPATIENT
Start: 2019-01-16 | End: 2019-01-17 | Stop reason: HOSPADM

## 2019-01-16 RX ORDER — OXYCODONE HYDROCHLORIDE 5 MG/1
5 TABLET ORAL EVERY 4 HOURS PRN
Status: DISCONTINUED | OUTPATIENT
Start: 2019-01-16 | End: 2019-01-16 | Stop reason: SDUPTHER

## 2019-01-16 RX ORDER — AMLODIPINE BESYLATE 10 MG/1
10 TABLET ORAL DAILY
Status: DISCONTINUED | OUTPATIENT
Start: 2019-01-17 | End: 2019-01-17 | Stop reason: HOSPADM

## 2019-01-16 RX ORDER — SERTRALINE HYDROCHLORIDE 50 MG/1
50 TABLET, FILM COATED ORAL NIGHTLY
Status: DISCONTINUED | OUTPATIENT
Start: 2019-01-16 | End: 2019-01-17 | Stop reason: HOSPADM

## 2019-01-16 RX ORDER — CEFAZOLIN SODIUM 1 G/3ML
2 INJECTION, POWDER, FOR SOLUTION INTRAMUSCULAR; INTRAVENOUS
Status: COMPLETED | OUTPATIENT
Start: 2019-01-16 | End: 2019-01-16

## 2019-01-16 RX ORDER — DEXTROSE MONOHYDRATE, SODIUM CHLORIDE, AND POTASSIUM CHLORIDE 50; 1.49; 4.5 G/1000ML; G/1000ML; G/1000ML
INJECTION, SOLUTION INTRAVENOUS
Status: COMPLETED
Start: 2019-01-16 | End: 2019-01-16

## 2019-01-16 RX ORDER — LIDOCAINE HYDROCHLORIDE 10 MG/ML
1 INJECTION, SOLUTION EPIDURAL; INFILTRATION; INTRACAUDAL; PERINEURAL ONCE
Status: COMPLETED | OUTPATIENT
Start: 2019-01-16 | End: 2019-01-16

## 2019-01-16 RX ORDER — OXYCODONE HYDROCHLORIDE 5 MG/1
5 TABLET ORAL EVERY 4 HOURS PRN
Status: DISCONTINUED | OUTPATIENT
Start: 2019-01-16 | End: 2019-01-16

## 2019-01-16 RX ORDER — OXYCODONE HYDROCHLORIDE 5 MG/1
5 TABLET ORAL EVERY 4 HOURS PRN
Status: DISCONTINUED | OUTPATIENT
Start: 2019-01-16 | End: 2019-01-17 | Stop reason: HOSPADM

## 2019-01-16 RX ADMIN — HYDROMORPHONE HYDROCHLORIDE 0.5 MG: 1 INJECTION, SOLUTION INTRAMUSCULAR; INTRAVENOUS; SUBCUTANEOUS at 09:01

## 2019-01-16 RX ADMIN — FENTANYL CITRATE 25 MCG: 50 INJECTION INTRAMUSCULAR; INTRAVENOUS at 11:01

## 2019-01-16 RX ADMIN — ESMOLOL HYDROCHLORIDE 20 MG: 10 INJECTION INTRAVENOUS at 08:01

## 2019-01-16 RX ADMIN — MIDAZOLAM HYDROCHLORIDE 2 MG: 1 INJECTION, SOLUTION INTRAMUSCULAR; INTRAVENOUS at 07:01

## 2019-01-16 RX ADMIN — SODIUM CHLORIDE, SODIUM GLUCONATE, SODIUM ACETATE, POTASSIUM CHLORIDE, MAGNESIUM CHLORIDE, SODIUM PHOSPHATE, DIBASIC, AND POTASSIUM PHOSPHATE: .53; .5; .37; .037; .03; .012; .00082 INJECTION, SOLUTION INTRAVENOUS at 08:01

## 2019-01-16 RX ADMIN — FENTANYL CITRATE 50 MCG: 50 INJECTION, SOLUTION INTRAMUSCULAR; INTRAVENOUS at 11:01

## 2019-01-16 RX ADMIN — PROPOFOL 30 MG: 10 INJECTION, EMULSION INTRAVENOUS at 09:01

## 2019-01-16 RX ADMIN — IPRATROPIUM BROMIDE AND ALBUTEROL SULFATE 3 ML: .5; 3 SOLUTION RESPIRATORY (INHALATION) at 11:01

## 2019-01-16 RX ADMIN — FENTANYL CITRATE 50 MCG: 50 INJECTION, SOLUTION INTRAMUSCULAR; INTRAVENOUS at 09:01

## 2019-01-16 RX ADMIN — CEFAZOLIN 1 G: 330 INJECTION, POWDER, FOR SOLUTION INTRAMUSCULAR; INTRAVENOUS at 09:01

## 2019-01-16 RX ADMIN — SERTRALINE HYDROCHLORIDE 50 MG: 50 TABLET ORAL at 09:01

## 2019-01-16 RX ADMIN — ESMOLOL HYDROCHLORIDE 30 MG: 10 INJECTION INTRAVENOUS at 08:01

## 2019-01-16 RX ADMIN — HYDROMORPHONE HYDROCHLORIDE 0.2 MG: 1 INJECTION, SOLUTION INTRAMUSCULAR; INTRAVENOUS; SUBCUTANEOUS at 11:01

## 2019-01-16 RX ADMIN — PROPOFOL 50 MG: 10 INJECTION, EMULSION INTRAVENOUS at 08:01

## 2019-01-16 RX ADMIN — OXYCODONE HYDROCHLORIDE 10 MG: 10 TABLET ORAL at 04:01

## 2019-01-16 RX ADMIN — LIDOCAINE HYDROCHLORIDE 40 MG: 20 INJECTION, SOLUTION INTRAVENOUS at 08:01

## 2019-01-16 RX ADMIN — FENTANYL CITRATE 50 MCG: 50 INJECTION, SOLUTION INTRAMUSCULAR; INTRAVENOUS at 08:01

## 2019-01-16 RX ADMIN — ESMOLOL HYDROCHLORIDE 10 MG: 10 INJECTION INTRAVENOUS at 09:01

## 2019-01-16 RX ADMIN — IPRATROPIUM BROMIDE AND ALBUTEROL SULFATE 3 ML: .5; 3 SOLUTION RESPIRATORY (INHALATION) at 07:01

## 2019-01-16 RX ADMIN — OXYCODONE HYDROCHLORIDE 5 MG: 5 TABLET ORAL at 11:01

## 2019-01-16 RX ADMIN — ONDANSETRON 4 MG: 2 INJECTION INTRAMUSCULAR; INTRAVENOUS at 10:01

## 2019-01-16 RX ADMIN — Medication 20 MG: at 09:01

## 2019-01-16 RX ADMIN — DEXTROSE MONOHYDRATE, SODIUM CHLORIDE, AND POTASSIUM CHLORIDE 1000 ML: 50; 1.49; 4.5 INJECTION, SOLUTION INTRAVENOUS at 11:01

## 2019-01-16 RX ADMIN — Medication 2 MG: at 02:01

## 2019-01-16 RX ADMIN — HYDROMORPHONE HYDROCHLORIDE 0.2 MG: 1 INJECTION, SOLUTION INTRAMUSCULAR; INTRAVENOUS; SUBCUTANEOUS at 12:01

## 2019-01-16 RX ADMIN — ROCURONIUM BROMIDE 10 MG: 10 INJECTION, SOLUTION INTRAVENOUS at 10:01

## 2019-01-16 RX ADMIN — IPRATROPIUM BROMIDE AND ALBUTEROL SULFATE 3 ML: .5; 3 SOLUTION RESPIRATORY (INHALATION) at 12:01

## 2019-01-16 RX ADMIN — FENTANYL CITRATE 100 MCG: 50 INJECTION, SOLUTION INTRAMUSCULAR; INTRAVENOUS at 08:01

## 2019-01-16 RX ADMIN — PROPOFOL 80 MG: 10 INJECTION, EMULSION INTRAVENOUS at 08:01

## 2019-01-16 RX ADMIN — HYDROMORPHONE HYDROCHLORIDE 0.5 MG: 1 INJECTION, SOLUTION INTRAMUSCULAR; INTRAVENOUS; SUBCUTANEOUS at 06:01

## 2019-01-16 RX ADMIN — ACETAMINOPHEN 1000 MG: 10 INJECTION, SOLUTION INTRAVENOUS at 09:01

## 2019-01-16 RX ADMIN — PHENYLEPHRINE HYDROCHLORIDE 50 MCG: 10 INJECTION INTRAVENOUS at 09:01

## 2019-01-16 RX ADMIN — ROCURONIUM BROMIDE 50 MG: 10 INJECTION, SOLUTION INTRAVENOUS at 08:01

## 2019-01-16 RX ADMIN — OXYCODONE HYDROCHLORIDE 10 MG: 10 TABLET ORAL at 07:01

## 2019-01-16 RX ADMIN — NEOSTIGMINE METHYLSULFATE 5 MG: 1 INJECTION INTRAVENOUS at 10:01

## 2019-01-16 RX ADMIN — POTASSIUM CHLORIDE, DEXTROSE MONOHYDRATE AND SODIUM CHLORIDE 1000 ML: 150; 5; 450 INJECTION, SOLUTION INTRAVENOUS at 11:01

## 2019-01-16 RX ADMIN — ACETAMINOPHEN 500 MG: 500 TABLET ORAL at 05:01

## 2019-01-16 RX ADMIN — GLYCOPYRROLATE 0.6 MG: 0.2 INJECTION, SOLUTION INTRAMUSCULAR; INTRAVENOUS at 10:01

## 2019-01-16 RX ADMIN — Medication 10 MG: at 10:01

## 2019-01-16 RX ADMIN — LIDOCAINE HYDROCHLORIDE 10 MG: 10 INJECTION, SOLUTION EPIDURAL; INFILTRATION; INTRACAUDAL; PERINEURAL at 06:01

## 2019-01-16 RX ADMIN — SODIUM CHLORIDE: 0.9 INJECTION, SOLUTION INTRAVENOUS at 06:01

## 2019-01-16 NOTE — NURSING TRANSFER
Nursing Transfer Note      1/16/2019     Transfer To: 1006    Transfer via bed    Transfer with chest tube     Transported by RN and pct    Medicines sent: [facility]     Chart send with patient: Yes    Notified: daughter via text

## 2019-01-16 NOTE — ANESTHESIA POSTPROCEDURE EVALUATION
Anesthesia Post Evaluation    Patient: Ariadna Villegas    Procedure(s) Performed: Procedure(s) (LRB):  VATS, WITH WEDGE RESECTION, LUNG (Right)  LYMPHADENECTOMY (Right)    Final Anesthesia Type: general  Patient location during evaluation: PACU  Patient participation: Yes- Able to Participate  Level of consciousness: awake and alert and oriented  Post-procedure vital signs: reviewed and stable  Pain management: adequate  Airway patency: patent  PONV status at discharge: No PONV  Anesthetic complications: no      Cardiovascular status: blood pressure returned to baseline, hemodynamically stable and stable  Respiratory status: unassisted, room air and spontaneous ventilation  Hydration status: euvolemic  Follow-up not needed.        Visit Vitals  BP (!) 142/64   Pulse 90   Temp 37.2 °C (98.9 °F) (Temporal)   Resp 18   Ht 5' (1.524 m)   Wt 38.1 kg (84 lb)   SpO2 98%   Breastfeeding? No   BMI 16.41 kg/m²       Pain/Toan Score: Pain Rating Prior to Med Admin: 10 (1/16/2019 12:45 PM)  Pain Rating Post Med Admin: 8 (1/16/2019 12:05 PM)  Toan Score: 9 (1/16/2019 12:15 PM)

## 2019-01-16 NOTE — ANESTHESIA PROCEDURE NOTES
Arterial    Diagnosis: Lung Mass    Patient location during procedure: done in OR  Procedure start time: 1/16/2019 8:36 AM  Timeout: 1/16/2019 8:36 AM  Procedure end time: 1/16/2019 8:49 AM  Staffing  Anesthesiologist: Heraclio Feng Jr., MD  Resident/CRNA: Rodríguez Aguilar MD  Performed: resident/CRNA and anesthesiologist   Anesthesiologist was present at the time of the procedure.  Preanesthetic Checklist  Completed: patient identified, site marked, surgical consent, pre-op evaluation, timeout performed, IV checked, risks and benefits discussed, monitors and equipment checked and anesthesia consent givenArterial  Skin Prep: chlorhexidine gluconate  Local Infiltration: none  Orientation: left  Location: radial  Catheter Size: 22 GInsertion Attempts: 3  Assessment  Dressing: secured with tape and tegaderm  Patient: Tolerated well

## 2019-01-16 NOTE — BRIEF OP NOTE
[facility]  Brief Operative Note    SUMMARY     Surgery Date: 1/16/2019     Surgeon(s) and Role:     * Celso Jewell MD - Primary     * Bienvenido Rosas MD - Fellow     * Tahir Saldaña MD - Resident - Assisting     * Darwin Palomares MD - Resident - Assisting    Pre-op Diagnosis:  Malignant neoplasm of lung, unspecified laterality, unspecified part of lung [C34.90]    Post-op Diagnosis:  Post-Op Diagnosis Codes:     * Malignant neoplasm of lung, unspecified laterality, unspecified part of lung [C34.90]    Procedure(s) (LRB):  1. Right VATS with RUL wedge resection  2. Right mediastinal lymphadenectomy  3. Intercostal nerve block    Anesthesia: General    Description of the findings of the procedure: Wedge resection of RUL tumor. Margins negative on frozen section. Levels 2R, 4R, and 7 lymphadenectomy. Exparel intercostal nerve block. 24 Fr Alban drain.    Estimated Blood Loss: 5 mL    Total IV Fluids: Per Anesthesia         Specimens:   Specimen (12h ago, onward)    Start     Ordered    01/16/19 0959  Specimen to Pathology - Surgery  Once     Comments:  1.  Right upper lobe Wedge--*check margins- Frozen2.  4R lymph node -perm3.  2R lymph node - perm4. Level 7 lymph node-perm     Start Status   01/16/19 0959 Collected (01/16/19 1028)       01/16/19 1027

## 2019-01-16 NOTE — TRANSFER OF CARE
Anesthesia Transfer of Care Note    Patient: Ariadna Villegas    Procedure(s) Performed: Procedure(s) (LRB):  VATS, WITH WEDGE RESECTION, LUNG (Right)  LYMPHADENECTOMY (Right)    Patient location: PACU    Anesthesia Type: general    Transport from OR: Transported from OR on 6-10 L/min O2 by face mask with adequate spontaneous ventilation    Post pain: adequate analgesia    Post assessment: no apparent anesthetic complications and tolerated procedure well    Post vital signs: stable    Level of consciousness: awake and alert    Nausea/Vomiting: no nausea/vomiting    Complications: none          Last vitals:   Visit Vitals  BP (!) 170/72 (BP Location: Left arm, Patient Position: Lying)   Pulse 82   Temp 37.2 °C (98.9 °F) (Temporal)   Resp 17   Ht 5' (1.524 m)   Wt 38.1 kg (84 lb)   SpO2 100%   Breastfeeding? No   BMI 16.41 kg/m²

## 2019-01-16 NOTE — INTERVAL H&P NOTE
The patient has been examined and the H&P has been reviewed:    I concur with the findings and changes have been noted since the H&P was written: PFTs with DLCO still less than 40%.  Patient does not want home oxygen.  Likely will be wedge resection rather than lobectomy.    Anesthesia/Surgery risks, benefits and alternative options discussed and understood by patient/family.      Active Hospital Problems    Diagnosis  POA    Squamous cell lung cancer [C34.90]  Yes      Resolved Hospital Problems   No resolved problems to display.     Bienvenido Rosas MD  Thoracic Surgery Resident, PGY7  General Thoracic Surgery  [facility]

## 2019-01-16 NOTE — PLAN OF CARE
Problem: Adult Inpatient Plan of Care  Goal: Plan of Care Review  Plan of care reviewed with patient who verbalized understanding.  AAO.  Ambulating with assistance.  CT to wall suction.  Pt with constant c/o pain. Pain mildly managed with PRN medication.  Using IS.  Call bell remains within reach.  Bed in lowest position.  Frequent rounds made for pt safety.  Patient remains free of any new or additional falls/injury at this time. VSS on ra, will continue to monitor.

## 2019-01-17 VITALS
TEMPERATURE: 99 F | SYSTOLIC BLOOD PRESSURE: 157 MMHG | OXYGEN SATURATION: 95 % | DIASTOLIC BLOOD PRESSURE: 70 MMHG | BODY MASS INDEX: 16.49 KG/M2 | RESPIRATION RATE: 13 BRPM | WEIGHT: 84 LBS | HEIGHT: 60 IN | HEART RATE: 100 BPM

## 2019-01-17 DIAGNOSIS — R91.1 PULMONARY NODULE: Primary | ICD-10-CM

## 2019-01-17 PROCEDURE — 94664 DEMO&/EVAL PT USE INHALER: CPT

## 2019-01-17 PROCEDURE — 25000003 PHARM REV CODE 250: Performed by: PHYSICIAN ASSISTANT

## 2019-01-17 PROCEDURE — 94761 N-INVAS EAR/PLS OXIMETRY MLT: CPT

## 2019-01-17 PROCEDURE — 63600175 PHARM REV CODE 636 W HCPCS: Performed by: STUDENT IN AN ORGANIZED HEALTH CARE EDUCATION/TRAINING PROGRAM

## 2019-01-17 PROCEDURE — 25000242 PHARM REV CODE 250 ALT 637 W/ HCPCS: Performed by: GENERAL PRACTICE

## 2019-01-17 PROCEDURE — 94640 AIRWAY INHALATION TREATMENT: CPT

## 2019-01-17 PROCEDURE — 63600175 PHARM REV CODE 636 W HCPCS: Performed by: PHYSICIAN ASSISTANT

## 2019-01-17 PROCEDURE — 25000003 PHARM REV CODE 250: Performed by: GENERAL PRACTICE

## 2019-01-17 PROCEDURE — 99900035 HC TECH TIME PER 15 MIN (STAT)

## 2019-01-17 RX ORDER — LIDOCAINE 50 MG/G
1 PATCH TOPICAL
Status: DISCONTINUED | OUTPATIENT
Start: 2019-01-17 | End: 2019-01-17 | Stop reason: HOSPADM

## 2019-01-17 RX ORDER — POLYETHYLENE GLYCOL 3350 17 G/17G
17 POWDER, FOR SOLUTION ORAL DAILY
Qty: 7 EACH | Refills: 0 | Status: SHIPPED | OUTPATIENT
Start: 2019-01-17 | End: 2019-01-24

## 2019-01-17 RX ORDER — OXYCODONE AND ACETAMINOPHEN 5; 325 MG/1; MG/1
1 TABLET ORAL EVERY 6 HOURS PRN
Qty: 30 TABLET | Refills: 0 | Status: SHIPPED | OUTPATIENT
Start: 2019-01-17 | End: 2019-01-17 | Stop reason: SDUPTHER

## 2019-01-17 RX ORDER — OXYCODONE AND ACETAMINOPHEN 5; 325 MG/1; MG/1
1 TABLET ORAL EVERY 6 HOURS PRN
Qty: 30 TABLET | Refills: 0 | Status: SHIPPED | OUTPATIENT
Start: 2019-01-17 | End: 2019-01-28

## 2019-01-17 RX ADMIN — IPRATROPIUM BROMIDE AND ALBUTEROL SULFATE 3 ML: .5; 3 SOLUTION RESPIRATORY (INHALATION) at 09:01

## 2019-01-17 RX ADMIN — AMLODIPINE BESYLATE 10 MG: 10 TABLET ORAL at 09:01

## 2019-01-17 RX ADMIN — ENOXAPARIN SODIUM 30 MG: 100 INJECTION SUBCUTANEOUS at 09:01

## 2019-01-17 RX ADMIN — OXYCODONE HYDROCHLORIDE 10 MG: 10 TABLET ORAL at 09:01

## 2019-01-17 RX ADMIN — OXYCODONE HYDROCHLORIDE 10 MG: 10 TABLET ORAL at 12:01

## 2019-01-17 RX ADMIN — ACETAMINOPHEN 500 MG: 500 TABLET ORAL at 12:01

## 2019-01-17 RX ADMIN — ACETAMINOPHEN 500 MG: 500 TABLET ORAL at 05:01

## 2019-01-17 RX ADMIN — ROSUVASTATIN CALCIUM 20 MG: 20 TABLET, FILM COATED ORAL at 09:01

## 2019-01-17 RX ADMIN — OXYCODONE HYDROCHLORIDE 10 MG: 10 TABLET ORAL at 05:01

## 2019-01-17 RX ADMIN — LIDOCAINE 1 PATCH: 50 PATCH TOPICAL at 08:01

## 2019-01-17 RX ADMIN — ASPIRIN 81 MG: 81 TABLET, COATED ORAL at 09:01

## 2019-01-17 RX ADMIN — METOPROLOL SUCCINATE 50 MG: 50 TABLET, EXTENDED RELEASE ORAL at 09:01

## 2019-01-17 RX ADMIN — IPRATROPIUM BROMIDE AND ALBUTEROL SULFATE 3 ML: .5; 3 SOLUTION RESPIRATORY (INHALATION) at 03:01

## 2019-01-17 RX ADMIN — HYDROMORPHONE HYDROCHLORIDE 0.5 MG: 1 INJECTION, SOLUTION INTRAMUSCULAR; INTRAVENOUS; SUBCUTANEOUS at 01:01

## 2019-01-17 NOTE — PROGRESS NOTES
Right pleural chest tube was removed without complication at approximately 08:30 am. Patient tolerated the procedure well. An occlusive dressing was placed. Will obtain a 2-hr post-pull CXR to evaluate for interval changes including pneumothorax and follow up result.    ANGELIQUE Palomares MD  General Surgery, PGY-1  Pager: [phone]

## 2019-01-17 NOTE — DISCHARGE INSTRUCTIONS
OK to shower 48 hours after surgery. Do not scrub incisions, but rather, let soapy water run over incisions. Do not submerge incisions in water (no baths, hot tubs, swimming pools).

## 2019-01-17 NOTE — PROGRESS NOTES
Pt DTV between 5938-5600. Pt has voided 100 cc spontaneously.    1800- bladder scan 684 cc.    1815 - Pt able to spontaneously void another 100 cc.   1820 - bladder scan 473 cc.  Pt denies pain/pressure or need to void again.  Dr. Feliciano notified of the above.  No new order at this time.  [facility] UOP/bladder scan.  Pt encouraged to increase PO intake at this time.

## 2019-01-17 NOTE — OP NOTE
Date of Procedure: 1/16/2019     Pre-operative Diagnosis: Non-Small Cell Lung Cancer, Right Upper Lobe; COPD     Post-operative Diagnosis: Same     Procedure(s): 1. Right Thoracoscopy with Therapeutic Upper Lobe Wedge Resection.  2. Right Thoracoscopy with Mediastinal Lymphadenectomy     Surgeon: Celso Jewell MD     Assistant(s): Bienvenido Rosas MD     Anesthesia: GETA     Findings: No evidence of extraparenchymal disease.  Margin negative on frozen section     Estimated Blood Loss: 10mL     Drains: 24 [place] Alban drain     Specimen(s): RUL wedge; Levels 2R, 4R and 7     Complications: None     Indications for Procedure: 74 yo female smoker with biopsy proven NSCLC of the right upper lobe, clinically stage I.  PFTs and functional status are prohibitive for lobectomy.  It was decided to proceed with sublobar resection.  Risks, benefits and possible outcomes were discussed in detail with the patient, and she was given the opportunity to ask questions and have those questions answered to his satisfaction.  She desires to proceed and signed consent.     Procedure in Detail: The patient was taken to the operating room and placed supine on the operating table.  Adequate general anesthesia was achieved.  Double lumen endotracheal tube was placed and its position and function were confirmed with bronchoscopy.  She was turned to the left lateral decubitus position, padded appropriately and secured.  Right chest was prepped and draped in standard sterile fashion.  Prophylactic intravenous antibiotics were administered.  Right lung was isolated.  Time-out was performed.  A 1.5cm incision was made in the 8th intercostal space in the posterior axillary line.  Port and camera were inserted.  There was no evidence of extraparenchymal disease.  A 3cm incision was made in the 5th intercostal space in the anterior axillary line.  Subcutaneous tissue was divided with electrocautery and the chest was entered.  Wound protector  was placed.  Nodule was palpated in the upper lobe.  Wedge resection was performed using an EndoGIA with purple loads.  The nodule was completely excised with a gross margin of more than one centimeters.  Frozen section confirmed a negative margin.  Mediastinal lymph node sampling was completed.  Then, 20mL of 1.3% Exparel was mixed with 10mL normal saline and used for a regional intercostal thoracic block; 3ml were injected into the intercostal spaces from T3-T10 under direct vision after aspiration to ensure extravascular injection.  Hemostasis was excellent.  A 24 English Alabn drain was passed through the camera port and directed posterior apically.  The lung was inflated under direct vision and filled the chest cavity nicely.  Tube was secured to the skin with Nurolon suture.  Utility incision was closed in layers with absorbable suture.  Steri-strips and sterile dressings were applied.  All sponge, needle and instrument counts were correct at the end of the case.  The patient tolerated the procedure well.  There were no immediate complications.  He was extubated in the operating room.     Disposition: PACU in stable condition

## 2019-01-17 NOTE — NURSING
Discharge instructions received, medications reconciled, patient escorted via w/c with transportation.

## 2019-01-17 NOTE — PLAN OF CARE
Problem: Adult Inpatient Plan of Care  Goal: Plan of Care Review  Outcome: Ongoing (interventions implemented as appropriate)  Pt AAOX4. Pain managed with PRN pain meds. Regular diet, no complaints of nausea or vomiting. Adequate urine output overnight. Right CT to suction, secure and patent. VS stable on 2L NC. Pt slept between care. SCD's in place. Bed low and locked, call bell within reach. Will continue to monitor.

## 2019-01-17 NOTE — DISCHARGE SUMMARY
[facility]  Thoracic Surgery  Discharge Summary    Patient Name: Ariadna Villegas  MRN: [id number]  Admission Date: 1/16/2019  Hospital Length of Stay: 1 days  Discharge Date and Time:  01/17/2019 12:27 PM  Attending Physician: Celso Jewell MD   Discharging Provider: Darwin Palomares MD  Primary Care Provider: Kirti Morris MD    HPI:   Patient is a 73 y.o. female with arthritis, COPD, HTN, osteoporosis, CKD presenting to thoracic clinic for follow-up for newly dx NSCLC (11/27/2018.) She was originally followed by Dr. Hager for a known RUL pulmonary nodule, noted since 11/2017 after presenting to the ED with bronchitis, due to repeat chest CT demonstrating an enlarging to 1.8 x 1.5 cm. Thus, a CT guided bx performed on 11/27/2018 with pathology concordant with squamous cell carcinoma in situ without invasion. She reports a 1 week period of weight loss (8 lb) but no shortness of breath, chest pain, hemoptysis, or continued weight loss. She remains active able to walk up a flight of stairs without stopping, METs > 4. She reports no history of chest surgeries or radiation, takes an aspirin 81 mg QD    PSH:  Hysterectomy, C/S x 3  Active tobacco use, currently in the process of quitting 57 years x < 1 PPD, retired x 19 years previously worked at [employer], as a [employer] employee, , no known exposure to asbestos.        Procedure(s) (LRB):  VATS, WITH WEDGE RESECTION, LUNG (Right)  LYMPHADENECTOMY (Right)      Hospital Course: Patient underwent right VATS with right upper lobe wedge resection and mediastinal lymphadenectomy on 1/16/19. She tolerated the procedure well and was transferred to the floor from PACU following her surgery. The night of POD 0, patient had poor pain control with PO PRN narcotics. She tolerated a regular diet and ambulated. Her right chest tube was removed on POD 1 and post-pull CXR showed no acute abnormalities. She was deemed safe for discharge home on  1/17/19.        Significant Diagnostic Studies: See chart review    Pending Diagnostic Studies:     None        Final Active Diagnoses:    Diagnosis Date Noted POA    PRINCIPAL PROBLEM:  Squamous cell lung cancer [C34.90] 01/16/2019 Yes      Problems Resolved During this Admission:      Discharged Condition: good    Disposition:     Follow Up:  Follow-up Information     Celso Jewell MD On 1/31/2019.    Specialty:  Cardiothoracic Surgery  Why:  2 week post-op follow-up with chest x-ray before  Contact information:  [address]  [facility] [address]  [phone]                 Patient Instructions:      Diet Adult Regular     No driving until:   Order Comments: Off narcotics     Notify your health care provider if you experience any of the following:  temperature >100.4     Notify your health care provider if you experience any of the following:  persistent nausea and vomiting or diarrhea     Notify your health care provider if you experience any of the following:  severe uncontrolled pain     Notify your health care provider if you experience any of the following:  redness, tenderness, or signs of infection (pain, swelling, redness, odor or green/yellow discharge around incision site)     Notify your health care provider if you experience any of the following:  difficulty breathing or increased cough     Notify your health care provider if you experience any of the following:  severe persistent headache     Notify your health care provider if you experience any of the following:  worsening rash     Notify your health care provider if you experience any of the following:  persistent dizziness, light-headedness, or visual disturbances     Notify your health care provider if you experience any of the following:  increased confusion or weakness     Activity as tolerated     Medications:  Reconciled Home Medications:      Medication List      START taking these medications    oxyCODONE-acetaminophen 5-325  mg per tablet  Commonly known as:  PERCOCET  Take 1 tablet by mouth every 6 (six) hours as needed.     polyethylene glycol 17 gram Pwpk  Commonly known as:  GLYCOLAX  Take 17 g by mouth once daily. for 7 days        CHANGE how you take these medications    rosuvastatin 20 MG tablet  Commonly known as:  CRESTOR  Take 1 tablet (20 mg total) by mouth once daily.  What changed:  when to take this     sertraline 50 MG tablet  Commonly known as:  ZOLOFT  TAKE 1 TABLET(50 MG) BY MOUTH EVERY DAY  What changed:    · how much to take  · how to take this  · when to take this  · additional instructions        CONTINUE taking these medications    amLODIPine 10 MG tablet  Commonly known as:  NORVASC  TAKE 1 TABLET(10 MG) BY MOUTH EVERY DAY     aspirin 81 MG EC tablet  Commonly known as:  ECOTRIN  Take 1 tablet (81 mg total) by mouth once daily.     losartan-hydrochlorothiazide 100-25 mg 100-25 mg per tablet  Commonly known as:  HYZAAR  TAKE 1 TABLET BY MOUTH EVERY DAY     metoprolol succinate 50 MG 24 hr tablet  Commonly known as:  TOPROL-XL  TAKE 3 TABLETS(150 MG) BY MOUTH EVERY EVENING     oxyCODONE 5 MG immediate release tablet  Commonly known as:  ROXICODONE  Take 5 mg by mouth every 6 (six) hours as needed.     prochlorperazine 10 MG tablet  Commonly known as:  COMPAZINE  TAKE 1 TABLET BY MOUTH EVERY 6 HOURS AS NEEDED     temazepam 7.5 MG Cap  Commonly known as:  RESTORIL  TAKE 2 CAPSULES(15 MG) BY MOUTH EVERY EVENING     tiZANidine 4 MG tablet  Commonly known as:  ZANAFLEX  Take 4 mg by mouth nightly as needed.            Darwin Palomares MD  Thoracic Surgery  [facility]

## 2019-01-17 NOTE — PLAN OF CARE
01/17/19 1228   Final Note   Assessment Type Final Discharge Note   Anticipated Discharge Disposition Home   What phone number can be called within the next 1-3 days to see how you are doing after discharge? [phone]   Hospital Follow Up  Appt(s) scheduled? Yes   Discharge plans and expectations educations in teach back method with documentation complete? Yes   Right Care Referral Info   Post Acute Recommendation No Care     Pt is ready for discharge. Home w/no needs. Follow up appts scheduled as follows:    Future Appointments   Date Time Provider Department Center   1/31/2019  9:15 AM Celso Jewell MD [facility]OR[facility]   2/7/2019  1:20 PM Jair Smiley MD [facility] CARDIO [facility] [place], [place]

## 2019-01-17 NOTE — PROGRESS NOTES
[facility]  Thoracic Surgery  Progress Note    Subjective:     History of Present Illness:  Patient is a 73 y.o. female with arthritis, COPD, HTN, osteoporosis, CKD presenting to thoracic clinic for follow-up for newly dx NSCLC (11/27/2018.) She was originally followed by Dr. Hager for a known RUL pulmonary nodule, noted since 11/2017 after presenting to the ED with bronchitis, due to repeat chest CT demonstrating an enlarging to 1.8 x 1.5 cm. Thus, a CT guided bx performed on 11/27/2018 with pathology concordant with squamous cell carcinoma in situ without invasion. She reports a 1 week period of weight loss (8 lb) but no shortness of breath, chest pain, hemoptysis, or continued weight loss. She remains active able to walk up a flight of stairs without stopping, METs > 4. She reports no history of chest surgeries or radiation, takes an aspirin 81 mg QD    PSH:  Hysterectomy, C/S x 3  Active tobacco use, currently in the process of quitting 57 years x < 1 PPD, retired x 19 years previously worked at [employer], as a [employer] employee, , no known exposure to asbestos.        Post-Op Info:  Procedure(s) (LRB):  VATS, WITH WEDGE RESECTION, LUNG (Right)  LYMPHADENECTOMY (Right)   1 Day Post-Op     Interval History: NAEON. Poor pain control with PO PRN narcotics overnight. On 2LNC with saturations nearly 100%. Voiding spontaneously but not completely emptying bladder. Denies pelvic pain or pressure. Tolerating regular diet.     Medications:  Continuous Infusions:  Scheduled Meds:   acetaminophen  500 mg Oral Q6H    albuterol-ipratropium  3 mL Nebulization Q4H    amLODIPine  10 mg Oral Daily    aspirin  81 mg Oral Daily    enoxaparin  30 mg Subcutaneous Daily    lidocaine  1 patch Transdermal Q24H    metoprolol succinate  50 mg Oral Daily    rosuvastatin  20 mg Oral Daily    sertraline  50 mg Oral QHS     PRN Meds:ondansetron, oxyCODONE, oxyCODONE, sodium chloride 0.9%      Review of patient's allergies indicates:   Allergen Reactions    Lunesta [eszopiclone] Other (See Comments)     Sleep walking     Objective:     Vital Signs (Most Recent):  Temp: 97.9 °F (36.6 °C) (01/17/19 0401)  Pulse: 110 (01/17/19 0401)  Resp: 17 (01/17/19 0401)  BP: 138/63 (01/17/19 0401)  SpO2: 98 % (01/17/19 0401) Vital Signs (24h Range):  Temp:  [96.3 °F (35.7 °C)-98.9 °F (37.2 °C)] 97.9 °F (36.6 °C)  Pulse:  [] 110  Resp:  [14-22] 17  SpO2:  [91 %-100 %] 98 %  BP: (138-170)/(63-80) 138/63     Intake/Output - Last 3 Shifts       01/15 0700 - 01/16 0659 01/16 0700 - 01/17 0659 01/17 0700 - 01/18 0659    P.O.  1250     I.V. (mL/kg)  275 (7.2)     Total Intake(mL/kg)  1525 (40)     Urine (mL/kg/hr)  900 (1)     Emesis/NG output  0     Other  0     Stool  0     Blood  0     Chest Tube  220     Total Output  1120     Net  +405            Stool Occurrence  0 x           SpO2: 98 %  O2 Device (Oxygen Therapy): nasal cannula    Physical Exam   Constitutional: She is oriented to person, place, and time. She appears well-developed and well-nourished.   HENT:   Mouth/Throat: Oropharynx is clear and moist.   Eyes: Pupils are equal, round, and reactive to light.   Neck: Normal range of motion. Neck supple.   Cardiovascular: Normal rate, regular rhythm, normal heart sounds and intact distal pulses.   Pulmonary/Chest: Effort normal and breath sounds normal. She has no wheezes.   Right chest tube with ss output, no air leak.    Abdominal: Soft. Bowel sounds are normal. She exhibits no distension. There is no tenderness.   Musculoskeletal: She exhibits no edema.   Lymphadenopathy:     She has no cervical adenopathy.   Neurological: She is alert and oriented to person, place, and time.   Skin: Skin is warm.   Psychiatric: She has a normal mood and affect.       Significant Labs:  CBC: No results for input(s): WBC, RBC, HGB, HCT, PLT, MCV, MCH, MCHC in the last 48 hours.  CMP: No results for input(s): GLU, CALCIUM,  ALBUMIN, PROT, NA, K, CO2, CL, BUN, CREATININE, ALKPHOS, ALT, AST, BILITOT in the last 48 hours.    Significant Diagnostics:  CXR: I have reviewed all pertinent results/findings within the past 24 hours    VTE Risk Mitigation (From admission, onward)        Ordered     enoxaparin injection 30 mg  Daily      01/16/19 1450     IP VTE HIGH RISK PATIENT  Once      01/16/19 1057     Place BERTO hose  Until discontinued      01/16/19 0619     Place sequential compression device  Until discontinued      01/16/19 0619        Assessment/Plan:     * Squamous cell lung cancer    73 year old female POD1 s/p right VATS for sublobar resection and MLND    - Pull chest tube this morning. Post-pull CXR.   - Continue PRN PO pain medications. Add Lidoderm patch.   - Wean NC O2 for saturations above 88%.  - Pulmonary toilet. Ambulate to halls.   - Monitor UOP.    Dispo- Home this afternoon or tomorrow morning.         JARETH Garcia  Thoracic Surgery  [facility]

## 2019-01-17 NOTE — ASSESSMENT & PLAN NOTE
73 year old female POD1 s/p right VATS for sublobar resection and MLND    - Pull chest tube this morning. Post-pull CXR.   - Continue PRN PO pain medications. Add Lidoderm patch.   - Wean NC O2 for saturations above 88%.  - Pulmonary toilet. Ambulate to halls.   - Monitor UOP.    Dispo- Home this afternoon or tomorrow morning.

## 2019-01-17 NOTE — SUBJECTIVE & OBJECTIVE
Interval History: NAEON. Poor pain control with PO PRN narcotics overnight. On 2LNC with saturations nearly 100%. Voiding spontaneously but not completely emptying bladder. Denies pelvic pain or pressure. Tolerating regular diet.     Medications:  Continuous Infusions:  Scheduled Meds:   acetaminophen  500 mg Oral Q6H    albuterol-ipratropium  3 mL Nebulization Q4H    amLODIPine  10 mg Oral Daily    aspirin  81 mg Oral Daily    enoxaparin  30 mg Subcutaneous Daily    lidocaine  1 patch Transdermal Q24H    metoprolol succinate  50 mg Oral Daily    rosuvastatin  20 mg Oral Daily    sertraline  50 mg Oral QHS     PRN Meds:ondansetron, oxyCODONE, oxyCODONE, sodium chloride 0.9%     Review of patient's allergies indicates:   Allergen Reactions    Lunesta [eszopiclone] Other (See Comments)     Sleep walking     Objective:     Vital Signs (Most Recent):  Temp: 97.9 °F (36.6 °C) (01/17/19 0401)  Pulse: 110 (01/17/19 0401)  Resp: 17 (01/17/19 0401)  BP: 138/63 (01/17/19 0401)  SpO2: 98 % (01/17/19 0401) Vital Signs (24h Range):  Temp:  [96.3 °F (35.7 °C)-98.9 °F (37.2 °C)] 97.9 °F (36.6 °C)  Pulse:  [] 110  Resp:  [14-22] 17  SpO2:  [91 %-100 %] 98 %  BP: (138-170)/(63-80) 138/63     Intake/Output - Last 3 Shifts       01/15 0700 - 01/16 0659 01/16 0700 - 01/17 0659 01/17 0700 - 01/18 0659    P.O.  1250     I.V. (mL/kg)  275 (7.2)     Total Intake(mL/kg)  1525 (40)     Urine (mL/kg/hr)  900 (1)     Emesis/NG output  0     Other  0     Stool  0     Blood  0     Chest Tube  220     Total Output  1120     Net  +405            Stool Occurrence  0 x           SpO2: 98 %  O2 Device (Oxygen Therapy): nasal cannula    Physical Exam   Constitutional: She is oriented to person, place, and time. She appears well-developed and well-nourished.   HENT:   Mouth/Throat: Oropharynx is clear and moist.   Eyes: Pupils are equal, round, and reactive to light.   Neck: Normal range of motion. Neck supple.   Cardiovascular: Normal  rate, regular rhythm, normal heart sounds and intact distal pulses.   Pulmonary/Chest: Effort normal and breath sounds normal. She has no wheezes.   Right chest tube with ss output, no air leak.    Abdominal: Soft. Bowel sounds are normal. She exhibits no distension. There is no tenderness.   Musculoskeletal: She exhibits no edema.   Lymphadenopathy:     She has no cervical adenopathy.   Neurological: She is alert and oriented to person, place, and time.   Skin: Skin is warm.   Psychiatric: She has a normal mood and affect.       Significant Labs:  CBC: No results for input(s): WBC, RBC, HGB, HCT, PLT, MCV, MCH, MCHC in the last 48 hours.  CMP: No results for input(s): GLU, CALCIUM, ALBUMIN, PROT, NA, K, CO2, CL, BUN, CREATININE, ALKPHOS, ALT, AST, BILITOT in the last 48 hours.    Significant Diagnostics:  CXR: I have reviewed all pertinent results/findings within the past 24 hours    VTE Risk Mitigation (From admission, onward)        Ordered     enoxaparin injection 30 mg  Daily      01/16/19 1450     IP VTE HIGH RISK PATIENT  Once      01/16/19 1057     Place BERTO hose  Until discontinued      01/16/19 0619     Place sequential compression device  Until discontinued      01/16/19 0619

## 2019-01-18 ENCOUNTER — TELEPHONE (OUTPATIENT)
Dept: CARDIOTHORACIC SURGERY | Facility: CLINIC | Age: 74
End: 2019-01-18

## 2019-01-18 LAB
BLD PROD TYP BPU: NORMAL
BLD PROD TYP BPU: NORMAL
BLOOD UNIT EXPIRATION DATE: NORMAL
BLOOD UNIT EXPIRATION DATE: NORMAL
BLOOD UNIT TYPE CODE: 5100
BLOOD UNIT TYPE CODE: 5100
BLOOD UNIT TYPE: NORMAL
BLOOD UNIT TYPE: NORMAL
CODING SYSTEM: NORMAL
CODING SYSTEM: NORMAL
DISPENSE STATUS: NORMAL
DISPENSE STATUS: NORMAL
TRANS ERYTHROCYTES VOL PATIENT: NORMAL ML
TRANS ERYTHROCYTES VOL PATIENT: NORMAL ML

## 2019-01-18 NOTE — TELEPHONE ENCOUNTER
Spoke with patient's daughter.  States that her Mom is resting.  Patient is having breakthrough pain.  Suggested trying Ibuprofen or Tylenol for breakthrough pain.  Instructed not to exceed 3000  mg per day. Patient's daughter states that she is eating, walking and using the bathroom.   Follow-up discussed.

## 2019-01-21 DIAGNOSIS — G47.00 INSOMNIA, UNSPECIFIED TYPE: ICD-10-CM

## 2019-01-21 RX ORDER — TEMAZEPAM 7.5 MG/1
CAPSULE ORAL
Qty: 60 CAPSULE | Refills: 0 | Status: SHIPPED | OUTPATIENT
Start: 2019-01-21 | End: 2019-02-19 | Stop reason: SDUPTHER

## 2019-01-25 ENCOUNTER — TELEPHONE (OUTPATIENT)
Dept: FAMILY MEDICINE | Facility: CLINIC | Age: 74
End: 2019-01-25

## 2019-01-25 NOTE — PHYSICIAN QUERY
PT Name: Ariadna Villegas  MR #: [id number]    Physician Query Form - Pathology Findings Clarification     CDS/: Madi Wood Jr, RN              Contact information:[email or web address]  This form is a permanent document in the medical record.     Query Date: January 25, 2019      By submitting this query, we are merely seeking further clarification of documentation.  Please utilize your independent clinical judgment when addressing the question(s) below.      The medical record contains the following:     Findings Supporting Clinical Information Location in Medical Record   Squamous Carcinoma   In Situ Of the Right Upper Lobe of the Lung Part 1  Container Label: Clinic Number/AP Number: [id number], and right upper lobe wedge  Received fresh for frozen section is a 23 g, 10.5 x 4.2 x 3 cm lung wedge.    FINAL PATHOLOGIC DIAGNOSIS  1. 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 ONE 4R, 3. THREE 2R, AND 4. THREE LEVEL 7 LYMPH NODES:  LYMPH NODES WITH NO METASTATIC CARCINOMA IDENTIFIED    Tumor Focality : Single tumor  Histologic Type: Largely nonkeratinizing squamous carcinoma in situ  Visceral Pleura Invasion: Not identified  Lymphovascular Invasion: Not identified  Direct Invasion of Adjacent Structures: Not identified  Margins: No neoplasia identified in margins; 0.2 cm to the resection margin  Treatment Effect: No known presurgical therapy  Number of Lymph Nodes Examined: 7  Number of Lymph Nodes Involved: 0  Pathologic Stage Classification: pTis pN0 oMX 1/16 Surgical Pathology Report     Please document the clinical significance of the Pathologists findings of       Squamous Carcinoma   In Situ Of the Right Upper Lobe of the Lung       .    [X] I agree with the Pathology Findings   [   ] I do not agree with the Pathology  Findings   [   ] Other/Clarification of Findings:   [   ] Clinically Insignificant   [  ] Clinically Undetermined       Please document in your progress notes daily for the duration of treatment until resolved and include in your discharge summary.

## 2019-01-26 ENCOUNTER — NURSE TRIAGE (OUTPATIENT)
Dept: ADMINISTRATIVE | Facility: CLINIC | Age: 74
End: 2019-01-26

## 2019-01-26 NOTE — TELEPHONE ENCOUNTER
Reason for Disposition   [1] SEVERE post-op pain (e.g., excruciating, pain scale 8-10) AND [2] not controlled with pain medications    Protocols used: ST POST-OP SYMPTOMS AND [id number]    Pt states she had R lung resection on 1/16/18. Pt states she continues with pain. Pt denies worsening of pain, but states pain has continued. Pt states she has been taking aleve without relief. Pt states she finished her prescribed pain pills. Pt denies redness, drainage, swelling, worsening SOB, or fever. Pt rates pain 7-8/10. Dr. Anderson called and notified of pt complaint. Dr. Avila states pt can take OTC tylenol per bottle instructions. Dr. Avila states If pain is not controlled then pt should be seen in ED or PCP office if open today. Pt called back, notified of Dr. Avila's instructions and pt verbalizes understanding. Pt advised per protocol and pt verbalizes understanding.

## 2019-01-28 ENCOUNTER — HOSPITAL ENCOUNTER (OUTPATIENT)
Dept: RADIOLOGY | Facility: HOSPITAL | Age: 74
Discharge: HOME | End: 2019-01-28
Attending: THORACIC SURGERY (CARDIOTHORACIC VASCULAR SURGERY)
Payer: MEDICARE

## 2019-01-28 ENCOUNTER — OFFICE VISIT (OUTPATIENT)
Dept: CARDIOTHORACIC SURGERY | Facility: CLINIC | Age: 74
End: 2019-01-28
Payer: MEDICARE

## 2019-01-28 VITALS
OXYGEN SATURATION: 97 % | WEIGHT: 89.31 LBS | SYSTOLIC BLOOD PRESSURE: 132 MMHG | HEART RATE: 70 BPM | HEIGHT: 60 IN | DIASTOLIC BLOOD PRESSURE: 65 MMHG | BODY MASS INDEX: 17.53 KG/M2

## 2019-01-28 DIAGNOSIS — R91.1 PULMONARY NODULE: ICD-10-CM

## 2019-01-28 DIAGNOSIS — D09.9 SQUAMOUS CELL CARCINOMA IN SITU: Primary | ICD-10-CM

## 2019-01-28 PROCEDURE — 99499 RISK ADDL DX/OHS AUDIT: ICD-10-PCS | Mod: S$GLB,,, | Performed by: THORACIC SURGERY (CARDIOTHORACIC VASCULAR SURGERY)

## 2019-01-28 PROCEDURE — 99999 PR PBB SHADOW E&M-EST. PATIENT-LVL IV: ICD-10-PCS | Mod: PBBFAC,,, | Performed by: THORACIC SURGERY (CARDIOTHORACIC VASCULAR SURGERY)

## 2019-01-28 PROCEDURE — 71046 X-RAY EXAM CHEST 2 VIEWS: CPT | Mod: TC,FY

## 2019-01-28 PROCEDURE — 99024 PR POST-OP FOLLOW-UP VISIT: ICD-10-PCS | Mod: S$GLB,,, | Performed by: THORACIC SURGERY (CARDIOTHORACIC VASCULAR SURGERY)

## 2019-01-28 PROCEDURE — 99499 UNLISTED E&M SERVICE: CPT | Mod: S$GLB,,, | Performed by: THORACIC SURGERY (CARDIOTHORACIC VASCULAR SURGERY)

## 2019-01-28 PROCEDURE — 99999 PR PBB SHADOW E&M-EST. PATIENT-LVL IV: CPT | Mod: PBBFAC,,, | Performed by: THORACIC SURGERY (CARDIOTHORACIC VASCULAR SURGERY)

## 2019-01-28 PROCEDURE — 71046 X-RAY EXAM CHEST 2 VIEWS: CPT | Mod: 26,,, | Performed by: RADIOLOGY

## 2019-01-28 PROCEDURE — 71046 XR CHEST PA AND LATERAL: ICD-10-PCS | Mod: 26,,, | Performed by: RADIOLOGY

## 2019-01-28 PROCEDURE — 99024 POSTOP FOLLOW-UP VISIT: CPT | Mod: S$GLB,,, | Performed by: THORACIC SURGERY (CARDIOTHORACIC VASCULAR SURGERY)

## 2019-01-28 RX ORDER — OXYCODONE AND ACETAMINOPHEN 5; 325 MG/1; MG/1
1 TABLET ORAL
Qty: 28 TABLET | Refills: 0 | Status: SHIPPED | OUTPATIENT
Start: 2019-01-28 | End: 2019-05-23 | Stop reason: SDUPTHER

## 2019-01-28 RX ORDER — GABAPENTIN 300 MG/1
300 CAPSULE ORAL 3 TIMES DAILY
Qty: 90 CAPSULE | Refills: 11 | Status: SHIPPED | OUTPATIENT
Start: 2019-01-28 | End: 2019-08-15

## 2019-01-28 NOTE — PROGRESS NOTES
Subjective:       Patient ID: Ariadna Villegas is a 73 y.o. female.    Chief Complaint: Post-op Evaluation    Diagnosis:  Squamous Cell Carcinoma in situ    Pre-operative therapy: none    Procedure(s) and date(s): 1/16/19: right VATS upper lobe wedge resection, MLND    Pathology: 1.2 cm squamous cell carcinoma in situ, Levels 2R,4R, 7 = negative, no VPI, no LVI, margins 0.2cm parenchymal margin, pTisN0     Post-operative therapy: surveillance     HPI   Patient is a 73 y.o. female with arthritis, COPD, HTN, osteoporosis, CKD presenting for 2 week follow-up from right VATS upper lobe wedge resection. She reports paresthesias in dermatomal distribution to right chest wall. Out of percocet. Infrequent cough. No longer taking restoril since starting pain meds. Scheduled for PCP visit in the near future. Tolerating diet. Bowel and bladder function normal. Denies fever, chills, SOB, nausea, vomiting, changes in bowel or bladder function.     Review of Systems   Constitutional: Negative for chills and fever.   HENT: Negative for congestion.    Eyes: Negative for pain.   Respiratory: Positive for chest tightness (in dermatomal distribution ). Negative for cough.    Cardiovascular: Negative for chest pain and palpitations.   Gastrointestinal: Negative for abdominal pain, nausea and vomiting.   Genitourinary: Negative for difficulty urinating.   Skin: Negative for color change and rash.   Neurological: Negative for syncope and headaches.   Hematological: Negative for adenopathy.   Psychiatric/Behavioral: Negative for confusion.         Objective:       Vitals:    01/28/19 1415   BP: 132/65   Pulse: 70   SpO2: 97%   Weight: 40.5 kg (89 lb 4.6 oz)   Height: 5' (1.524 m)   PainSc: 0-No pain       Physical Exam   Constitutional: She is oriented to person, place, and time. She appears well-developed and well-nourished.   HENT:   Head: Normocephalic and atraumatic.   Eyes: EOM are normal.   Neck: Normal range of motion. Neck  supple. No tracheal deviation present.   Cardiovascular: Normal rate, regular rhythm and normal heart sounds.   Pulmonary/Chest: Effort normal and breath sounds normal. She has no wheezes.   Abdominal: Soft.   Musculoskeletal: Normal range of motion.   Neurological: She is alert and oriented to person, place, and time.   Skin: Skin is warm and dry.   Psychiatric: She has a normal mood and affect. Thought content normal.   Vitals reviewed.        CXR 1/28/19:  Postoperative changes compatible with history of right-sided wedge resection.  Improved aeration at both lung bases with interval resolution of right-sided subcutaneous emphysema.  Some irregular opacities persist at the right upper lung zone, likely representing parenchymal scarring.  It is difficult to completely exclude residual tumor or nodule in this region.    Assessment:       Patient is a 73 y.o. female with arthritis, COPD, HTN, osteoporosis, CKD presenting for 2 week follow-up from right VATS upper lobe wedge resection. Pathology reviewed.     Plan:       RTC in 6 months with chest CT.   1 week Rx for percocet refilled. Rx for gabapentin sent to pharmacy. Start with 300mg qHS then gradually increase to TID if needed.   Distress Screening Results: Psychosocial Distress screening score of Distress Score: 0 noted and reviewed. No intervention indicated.     ATTENDING ATTESTATION:    I evaluated the patient and I agree with the assessment and plan.  Doing well, other than some neuropathic pain.  Rx for Gabapentin.  CXR clear, incisions healing well.  Path as above.  No indication for adjuvant therapy or further surgery.  RTC in 6 months with chest CT.

## 2019-01-30 ENCOUNTER — PATIENT MESSAGE (OUTPATIENT)
Dept: HEMATOLOGY/ONCOLOGY | Facility: CLINIC | Age: 74
End: 2019-01-30

## 2019-02-01 ENCOUNTER — TELEPHONE (OUTPATIENT)
Dept: FAMILY MEDICINE | Facility: CLINIC | Age: 74
End: 2019-02-01

## 2019-02-01 NOTE — TELEPHONE ENCOUNTER
I would recommend she notify her GI doctor, the doctor that did the colonoscopy    Kirti Morris MD

## 2019-02-01 NOTE — TELEPHONE ENCOUNTER
Patient reports that she had an colonoscopy in October 2018. Then recently had an lung procedure 2 weeks ago. After patient had her colonoscopy,  She started not feeling when she defecates on herself. It only happened once since the colonoscopy. Two weeks later, it started happening again. She has to wear pads no matter where is goes b/c she doesn't know when it happens. Please advise.

## 2019-02-01 NOTE — TELEPHONE ENCOUNTER
----- Message from Shilpa Tyson sent at 2/1/2019 11:50 AM CST -----  Contact: Self/ [phone]  cell [phone]  Patient would like staff to give her a call.  She had a colonoscopy the end of 2018, and she's starting to have problems.  Thank you.

## 2019-02-19 ENCOUNTER — OFFICE VISIT (OUTPATIENT)
Dept: FAMILY MEDICINE | Facility: CLINIC | Age: 74
End: 2019-02-19
Payer: MEDICARE

## 2019-02-19 VITALS
BODY MASS INDEX: 17.25 KG/M2 | RESPIRATION RATE: 18 BRPM | WEIGHT: 87.88 LBS | SYSTOLIC BLOOD PRESSURE: 100 MMHG | DIASTOLIC BLOOD PRESSURE: 52 MMHG | HEIGHT: 60 IN | TEMPERATURE: 98 F | HEART RATE: 56 BPM

## 2019-02-19 DIAGNOSIS — J44.9 CHRONIC OBSTRUCTIVE PULMONARY DISEASE, UNSPECIFIED COPD TYPE: Chronic | ICD-10-CM

## 2019-02-19 DIAGNOSIS — C34.90 SQUAMOUS CELL CARCINOMA OF LUNG, UNSPECIFIED LATERALITY: Primary | ICD-10-CM

## 2019-02-19 DIAGNOSIS — I73.9 PVD (PERIPHERAL VASCULAR DISEASE): ICD-10-CM

## 2019-02-19 DIAGNOSIS — G47.00 INSOMNIA, UNSPECIFIED TYPE: ICD-10-CM

## 2019-02-19 DIAGNOSIS — F13.20 SEDATIVE HYPNOTIC OR ANXIOLYTIC DEPENDENCE: ICD-10-CM

## 2019-02-19 PROBLEM — Z72.0 TOBACCO ABUSE: Status: RESOLVED | Noted: 2018-11-07 | Resolved: 2019-02-19

## 2019-02-19 PROCEDURE — 3078F PR MOST RECENT DIASTOLIC BLOOD PRESSURE < 80 MM HG: ICD-10-PCS | Mod: CPTII,S$GLB,, | Performed by: FAMILY MEDICINE

## 2019-02-19 PROCEDURE — 99499 RISK ADDL DX/OHS AUDIT: ICD-10-PCS | Mod: S$GLB,,, | Performed by: FAMILY MEDICINE

## 2019-02-19 PROCEDURE — 99499 UNLISTED E&M SERVICE: CPT | Mod: S$GLB,,, | Performed by: FAMILY MEDICINE

## 2019-02-19 PROCEDURE — 1101F PT FALLS ASSESS-DOCD LE1/YR: CPT | Mod: CPTII,S$GLB,, | Performed by: FAMILY MEDICINE

## 2019-02-19 PROCEDURE — 3078F DIAST BP <80 MM HG: CPT | Mod: CPTII,S$GLB,, | Performed by: FAMILY MEDICINE

## 2019-02-19 PROCEDURE — 3074F PR MOST RECENT SYSTOLIC BLOOD PRESSURE < 130 MM HG: ICD-10-PCS | Mod: CPTII,S$GLB,, | Performed by: FAMILY MEDICINE

## 2019-02-19 PROCEDURE — 99999 PR PBB SHADOW E&M-EST. PATIENT-LVL IV: CPT | Mod: PBBFAC,,, | Performed by: FAMILY MEDICINE

## 2019-02-19 PROCEDURE — 99999 PR PBB SHADOW E&M-EST. PATIENT-LVL IV: ICD-10-PCS | Mod: PBBFAC,,, | Performed by: FAMILY MEDICINE

## 2019-02-19 PROCEDURE — 99214 PR OFFICE/OUTPT VISIT, EST, LEVL IV, 30-39 MIN: ICD-10-PCS | Mod: S$GLB,,, | Performed by: FAMILY MEDICINE

## 2019-02-19 PROCEDURE — 3074F SYST BP LT 130 MM HG: CPT | Mod: CPTII,S$GLB,, | Performed by: FAMILY MEDICINE

## 2019-02-19 PROCEDURE — 99214 OFFICE O/P EST MOD 30 MIN: CPT | Mod: S$GLB,,, | Performed by: FAMILY MEDICINE

## 2019-02-19 PROCEDURE — 1101F PR PT FALLS ASSESS DOC 0-1 FALLS W/OUT INJ PAST YR: ICD-10-PCS | Mod: CPTII,S$GLB,, | Performed by: FAMILY MEDICINE

## 2019-02-19 RX ORDER — OXYCODONE HYDROCHLORIDE 5 MG/1
TABLET ORAL
Refills: 0 | COMMUNITY
Start: 2019-02-08 | End: 2019-02-19

## 2019-02-19 RX ORDER — TEMAZEPAM 7.5 MG/1
15 CAPSULE ORAL NIGHTLY PRN
Qty: 60 CAPSULE | Refills: 2 | Status: SHIPPED | OUTPATIENT
Start: 2019-02-19 | End: 2019-05-13

## 2019-02-19 NOTE — PROGRESS NOTES
Chief Complaint   Patient presents with    lung surgery    Follow-up       HPI  Ariadna Villegas is a 74 y.o. female with multiple medical diagnoses as listed in the medical history and problem list that presents for follow-up for post op VATS upper lob wedge resection. Per oncology she needs to f/u in 6 mos.     She is having pain in her right chest since her surgery. Pain is improving some with neurontin but this is mild. She was informed that she had nerve inflammation from the surgery.    She has not been able to get her restoril due to being on narcotic pain medication.    Currently she has some concerns about an abnormal CT scan of her head which showed a brain mass.    PAST MEDICAL HISTORY:  Past Medical History:   Diagnosis Date    Arthritis     neck and back    COPD (chronic obstructive pulmonary disease)     Malignant hypertension     Non-small cell lung cancer 2018    Osteoporosis, postmenopausal     Tobacco use disorder        PAST SURGICAL HISTORY:  Past Surgical History:   Procedure Laterality Date    Biopsy-Lung N/A 2018    Performed by [facility] at [facility] OR 2ND FLR     x3      HYSTERECTOMY      LYMPHADENECTOMY Right 2019    Performed by Celso Jewell [facility] at [facility]    PARTIAL HYSTERECTOMY      , after her third     SALPINGOOPHORECTOMY      , for a ovarian cyst    VATS, WITH WEDGE RESECTION, LUNG Right 2019    Performed by Celso Jewell MD at [facility] OR [address] [place] [address]       SOCIAL HISTORY:  Social History     Socioeconomic History    Marital status:      Spouse name: Not on file    Number of children: Not on file    Years of education: Not on file    Highest education level: Not on file   Social Needs    Financial resource strain: Not on file    Food insecurity - worry: Not on file    Food insecurity - inability: Not on file    Transportation needs - medical: Not on file    Transportation needs -  non-medical: Not on file   Occupational History    Occupation: Retired   Tobacco Use    Smoking status: Former Smoker     Packs/day: 0.15     Years: 60.00     Pack years: 9.00     Types: Cigarettes    Smokeless tobacco: Never Used    Tobacco comment: quit x 2-3 wks. ago   Substance and Sexual Activity    Alcohol use: Yes     Comment: socially    Drug use: No    Sexual activity: No     Partners: Male     Comment:    Other Topics Concern    Not on file   Social History Narrative    Not on file       FAMILY HISTORY:  Family History   Problem Relation Age of Onset    Heart disease Mother     Diabetes Mother     Stroke Father     Heart disease Sister     Anesthesia problems Neg Hx        ALLERGIES AND MEDICATIONS: updated and reviewed.  Review of patient's allergies indicates:   Allergen Reactions    Lunesta [eszopiclone] Other (See Comments)     Sleep walking     Current Outpatient Medications   Medication Sig Dispense Refill    aspirin (ECOTRIN) 81 MG EC tablet Take 1 tablet (81 mg total) by mouth once daily.      gabapentin (NEURONTIN) 300 MG capsule Take 1 capsule (300 mg total) by mouth 3 (three) times daily. Start by taking one capsule at night and slowly increasing to 3 times daily. 90 capsule 11    losartan-hydrochlorothiazide 100-25 mg (HYZAAR) 100-25 mg per tablet TAKE 1 TABLET BY MOUTH EVERY DAY 90 tablet 0    metoprolol succinate (TOPROL-XL) 50 MG 24 hr tablet TAKE 3 TABLETS(150 MG) BY MOUTH EVERY EVENING 270 tablet 0    oxyCODONE-acetaminophen (PERCOCET) 5-325 mg per tablet Take 1 tablet by mouth every 4 to 6 hours as needed for Pain (Pain related to surgery for cancer). 28 tablet 0    rosuvastatin (CRESTOR) 20 MG tablet Take 1 tablet (20 mg total) by mouth once daily. (Patient taking differently: Take 20 mg by mouth every morning. ) 30 tablet 11    tiZANidine (ZANAFLEX) 4 MG tablet Take 4 mg by mouth nightly as needed.      prochlorperazine (COMPAZINE) 10 MG tablet TAKE 1 TABLET  BY MOUTH EVERY 6 HOURS AS NEEDED 337 tablet 1    sertraline (ZOLOFT) 50 MG tablet TAKE 1 TABLET(50 MG) BY MOUTH EVERY DAY (Patient taking differently: Take 50 mg by mouth every evening. TAKE 1 TABLET(50 MG) BY MOUTH EVERY DAY) 90 tablet 1    temazepam (RESTORIL) 7.5 MG Cap Take 2 capsules (15 mg total) by mouth nightly as needed. 60 capsule 2     Current Facility-Administered Medications   Medication Dose Route Frequency Provider Last Rate Last Dose    cyanocobalamin injection 100 mcg  100 mcg Intramuscular Q30 Days MENDEL Wolf MD   100 mcg at 05/08/17 1509       ROS  Review of Systems   Constitutional: Negative for chills, diaphoresis, fatigue, fever and unexpected weight change.   HENT: Negative for rhinorrhea, sinus pressure, sore throat and tinnitus.    Eyes: Negative for photophobia and visual disturbance.   Respiratory: Negative for cough, shortness of breath and wheezing.    Cardiovascular: Negative for chest pain and palpitations.   Gastrointestinal: Negative for abdominal pain, blood in stool, constipation, diarrhea, nausea and vomiting.   Genitourinary: Negative for dysuria, flank pain, frequency and vaginal discharge.   Musculoskeletal: Positive for arthralgias. Negative for joint swelling.   Skin: Negative for rash.   Neurological: Negative for speech difficulty, weakness, light-headedness and headaches.   Psychiatric/Behavioral: Negative for behavioral problems and dysphoric mood.       Physical Exam  Vitals:    02/19/19 1441   BP: (!) 100/52   Pulse: (!) 56   Resp: 18   Temp: 98.2 °F (36.8 °C)   TempSrc: Oral   Weight: 39.8 kg (87 lb 13.7 oz)   Height: 5' (1.524 m)    Body mass index is 17.16 kg/m².  Weight: 39.8 kg (87 lb 13.7 oz)   Height: 5' (152.4 cm)     Physical Exam   Constitutional: She is oriented to person, place, and time. She appears well-developed and well-nourished. No distress.   Eyes: EOM are normal.   Neck: Neck supple.   Cardiovascular: Normal rate and regular rhythm.  Exam reveals no gallop and no friction rub.   No murmur heard.  Pulmonary/Chest: Effort normal and breath sounds normal. No respiratory distress. She has no wheezes. She has no rales.   Lymphadenopathy:     She has no cervical adenopathy.   Neurological: She is alert and oriented to person, place, and time.   Skin: Skin is warm and dry. No rash noted.   Psychiatric: She has a normal mood and affect. Her behavior is normal.   Nursing note and vitals reviewed.      Health Maintenance       Date Due Completion Date    Lipid Panel 07/26/2019 7/26/2018    High Dose Statin 02/19/2020 2/19/2019    Mammogram 04/06/2020 4/6/2018    Override on 3/28/2017: Declined    Override on 3/25/2014: Declined (not at this present time)    Override on 5/14/2012: Done    DEXA SCAN 07/26/2021 7/26/2018    Override on 3/25/2014: Declined (not at this present time)    Override on 6/12/2012: Done    Colonoscopy 10/30/2021 10/30/2018    Override on 1/1/2008: Done    Override on 5/14/2006: Done    TETANUS VACCINE 08/24/2026 8/24/2016 (Declined)    Override on 8/24/2016: Declined            ASSESSMENT     1. Squamous cell carcinoma of lung, unspecified laterality    2. Sedative hypnotic or anxiolytic dependence    3. PVD (peripheral vascular disease)    4. Chronic obstructive pulmonary disease, unspecified COPD type    5. Insomnia, unspecified type        PLAN:     Problem List Items Addressed This Visit        Psychiatric    Sedative hypnotic or anxiolytic dependence (Chronic)  -stable       Pulmonary    COPD (chronic obstructive pulmonary disease) (Chronic)    Overview     Mild per pft.  +emphysematous changes on ct.            Oncology    Squamous cell lung cancer - Primary  -s/p VATS thoracotomy, on neurontin for pain       Other    Insomnia  -will refill her restoril if her insurance will cover this  -she is instructed not to take her medication with her pain medicine    Relevant Medications    temazepam (RESTORIL) 7.5 MG Cap      Other  Visit Diagnoses     PVD (peripheral vascular disease)    -on statin              Kirti Morris MD  02/21/2019 3:19 PM        Follow-up in about 3 months (around 5/19/2019) for Follow up.

## 2019-02-20 ENCOUNTER — TELEPHONE (OUTPATIENT)
Dept: FAMILY MEDICINE | Facility: CLINIC | Age: 74
End: 2019-02-20

## 2019-02-20 NOTE — TELEPHONE ENCOUNTER
Spoke with patient and discussed recommendations. She would like to defer neurosurgery consult at this time as she has other appointments. We will discuss this again at her next visit.    Kirti Morris MD

## 2019-02-20 NOTE — TELEPHONE ENCOUNTER
----- Message from Vicki Bedoya MD sent at 2/19/2019  3:46 PM CST -----  Regarding: RE: CT scan of head  Hi,    Dr. Jewell and I discussed it and we were not overly concerned with a metastatic lesion. She cannot undergo an MRI w/contrast or CT w/contrast. I think a referral to Neurosurgery ( Dr. Haney) for a second opinion is reasonable. Thanks,   ----- Message -----  From: Kirti Morris MD  Sent: 2/19/2019   3:24 PM  To: Vicki Bedoya MD  Subject: CT scan of head                                  Hi Dr. Bedoya,    I'm seeing Ariadna in clinic and she expressed some concern about her CT scan showing a lesion in her brain. I reviewed the report and it states that it could be ischemia but mets can't be excluded however it seems like her PET was negative and she had a successful VATs.    Would you recommend getting an MRI brain to further define the lesion on the CT scan    Thanks,    Kirti Morris MD

## 2019-02-22 ENCOUNTER — OFFICE VISIT (OUTPATIENT)
Dept: CARDIOLOGY | Facility: CLINIC | Age: 74
End: 2019-02-22
Payer: MEDICARE

## 2019-02-22 VITALS
OXYGEN SATURATION: 98 % | BODY MASS INDEX: 16.64 KG/M2 | WEIGHT: 85.19 LBS | SYSTOLIC BLOOD PRESSURE: 120 MMHG | RESPIRATION RATE: 16 BRPM | HEART RATE: 75 BPM | DIASTOLIC BLOOD PRESSURE: 62 MMHG

## 2019-02-22 DIAGNOSIS — E78.2 MIXED HYPERLIPIDEMIA: ICD-10-CM

## 2019-02-22 DIAGNOSIS — Z72.0 TOBACCO ABUSE: ICD-10-CM

## 2019-02-22 DIAGNOSIS — I70.1 RENAL ARTERY STENOSIS: ICD-10-CM

## 2019-02-22 DIAGNOSIS — I10 ESSENTIAL HYPERTENSION: ICD-10-CM

## 2019-02-22 DIAGNOSIS — R06.02 SHORTNESS OF BREATH: Primary | ICD-10-CM

## 2019-02-22 DIAGNOSIS — I73.9 PVD (PERIPHERAL VASCULAR DISEASE): ICD-10-CM

## 2019-02-22 DIAGNOSIS — J43.1 PANLOBULAR EMPHYSEMA: ICD-10-CM

## 2019-02-22 PROCEDURE — 3078F DIAST BP <80 MM HG: CPT | Mod: CPTII,S$GLB,, | Performed by: INTERNAL MEDICINE

## 2019-02-22 PROCEDURE — 99999 PR PBB SHADOW E&M-EST. PATIENT-LVL III: ICD-10-PCS | Mod: PBBFAC,,, | Performed by: INTERNAL MEDICINE

## 2019-02-22 PROCEDURE — 99214 OFFICE O/P EST MOD 30 MIN: CPT | Mod: S$GLB,,, | Performed by: INTERNAL MEDICINE

## 2019-02-22 PROCEDURE — 1101F PT FALLS ASSESS-DOCD LE1/YR: CPT | Mod: CPTII,S$GLB,, | Performed by: INTERNAL MEDICINE

## 2019-02-22 PROCEDURE — 3074F PR MOST RECENT SYSTOLIC BLOOD PRESSURE < 130 MM HG: ICD-10-PCS | Mod: CPTII,S$GLB,, | Performed by: INTERNAL MEDICINE

## 2019-02-22 PROCEDURE — 99214 PR OFFICE/OUTPT VISIT, EST, LEVL IV, 30-39 MIN: ICD-10-PCS | Mod: S$GLB,,, | Performed by: INTERNAL MEDICINE

## 2019-02-22 PROCEDURE — 3074F SYST BP LT 130 MM HG: CPT | Mod: CPTII,S$GLB,, | Performed by: INTERNAL MEDICINE

## 2019-02-22 PROCEDURE — 99999 PR PBB SHADOW E&M-EST. PATIENT-LVL III: CPT | Mod: PBBFAC,,, | Performed by: INTERNAL MEDICINE

## 2019-02-22 PROCEDURE — 3078F PR MOST RECENT DIASTOLIC BLOOD PRESSURE < 80 MM HG: ICD-10-PCS | Mod: CPTII,S$GLB,, | Performed by: INTERNAL MEDICINE

## 2019-02-22 PROCEDURE — 1101F PR PT FALLS ASSESS DOC 0-1 FALLS W/OUT INJ PAST YR: ICD-10-PCS | Mod: CPTII,S$GLB,, | Performed by: INTERNAL MEDICINE

## 2019-02-22 NOTE — PROGRESS NOTES
Subjective:    Patient ID:  Ariadna Villegas is a 74 y.o. female who presents for follow-up of Follow-up (6mo )      HPI   previous history:  Her follow-up chest pain, known PVD and hypertension.  She is here for follow-up of diagnostic testing as below.  She says her symptoms have not changed and are stable.  She had extensive testing which was unchanged from previous including left renal artery plaque but otherwise normal cardiac testing including echo and stress.  She had a CT scan done to follow-up and says that her pulmonologist has retired needs a new 1.  She denies any PND, orthopnea or lower extremity edema. She has not experienced any dizziness to the point of presyncope or syncope.    Today:  Here for follow-up chest pain, known PVD and hypertension.  She recently had vats to evaluate for squamous cell lung cancer.  She had VATS and wedge resection.  Currently she feels a little bit better now that the pain is resolved.  She denies any worsening cardiopulmonary complaints.  She did stop smoking.      Review of Systems   Constitution: Negative.   HENT: Negative.    Eyes: Negative.    Cardiovascular: Positive for chest pain and dyspnea on exertion. Negative for irregular heartbeat, leg swelling, near-syncope, orthopnea, palpitations, paroxysmal nocturnal dyspnea and syncope.   Respiratory: Positive for sleep disturbances due to breathing. Negative for shortness of breath.    Skin: Negative.    Musculoskeletal: Negative.    Gastrointestinal: Negative for abdominal pain, constipation and diarrhea.   Genitourinary: Negative for dysuria.   Neurological: Negative.    Psychiatric/Behavioral: The patient has insomnia.         Objective:    Physical Exam   Constitutional: She is oriented to person, place, and time. She appears well-developed and well-nourished.   HENT:   Head: Normocephalic and atraumatic.   Eyes: Conjunctivae and EOM are normal. Pupils are equal, round, and reactive to light.   Neck: Normal range  of motion. Neck supple. No thyromegaly present.   Cardiovascular: Normal rate and regular rhythm.   No murmur heard.  Pulmonary/Chest: Effort normal and breath sounds normal. No respiratory distress.   Abdominal: Soft. Bowel sounds are normal.   Musculoskeletal: She exhibits no edema.   Neurological: She is alert and oriented to person, place, and time.   Skin: Skin is warm and dry.   Psychiatric: She has a normal mood and affect. Her behavior is normal.       echocardiogram:  8-18  CONCLUSIONS     1 - Normal left ventricular systolic function (EF 60-65%).     2 - No wall motion abnormalities.      NST:  Impression: NORMAL MYOCARDIAL PERFUSION  1. The perfusion scan is free of evidence for myocardial ischemia or injury.   2. Resting wall motion is physiologic. There was abnormal septal motion possibly due to a conduction delay or post surgical changes.   3. Visually estimated LV function is normal.   4. The ventricular volumes are normal at rest and stress.   5. The extracardiac distribution of radioactivity is normal.   6. When compared to the previous study from 11/30/2016, abnormal septal motion now noted, otherwise normal perfusion/EF.    Renal artery ultrasound:  8-18  There are findings concerning for left renal artery stenosis at the origin of the left renal artery.    Ultrasound carotid:  CONCLUSIONS   There is 20 - 39% right Internal Carotid stenosis.  There is 40 - 49% left Internal Carotid stenosis.    LDL- 144   7-18    CT chest:  Irregular soft tissue opacity right upper lobe does demonstrate some increased soft tissue component on today's study measuring at least 1.8 x 1.5 cm.  Pulmonary consultation and consideration for biopsy and/or PET scan suggested.    4 mm right lower lobe nodule less conspicuous on today's study.    Emphysematous changes noted.    Aortic annulus and coronary artery calcifications again noted.    Additional findings as above.    Assessment:       1. Shortness of breath    2.  Essential hypertension    3. Tobacco abuse    4. PVD (peripheral vascular disease)    5. Renal artery stenosis    6. Panlobular emphysema    7. Mixed hyperlipidemia         Plan:       -continue medical therapy  -Continue follow symptoms mainly, likely blockage and if any significant progression consider catheterization  -SBP stable, serum creatinine mildly elevated but prefers conservative therapy (* with coral data currently would not stent renal artery)  -Counseled on tobacco cessation, now quit post VATS  -check lipids at next visit  -follow-up with thoracic surgery     RTC 6 months

## 2019-04-08 DIAGNOSIS — I10 ESSENTIAL HYPERTENSION: Chronic | ICD-10-CM

## 2019-04-08 RX ORDER — LOSARTAN POTASSIUM AND HYDROCHLOROTHIAZIDE 25; 100 MG/1; MG/1
1 TABLET ORAL DAILY
Qty: 14 TABLET | Refills: 0 | Status: SHIPPED | OUTPATIENT
Start: 2019-04-08 | End: 2019-04-08 | Stop reason: SDUPTHER

## 2019-04-08 RX ORDER — METOPROLOL SUCCINATE 50 MG/1
50 TABLET, EXTENDED RELEASE ORAL DAILY
Qty: 14 TABLET | Refills: 0 | Status: SHIPPED | OUTPATIENT
Start: 2019-04-08 | End: 2019-07-25 | Stop reason: SDUPTHER

## 2019-04-08 RX ORDER — LOSARTAN POTASSIUM AND HYDROCHLOROTHIAZIDE 25; 100 MG/1; MG/1
1 TABLET ORAL DAILY
Qty: 14 TABLET | Refills: 0 | Status: SHIPPED | OUTPATIENT
Start: 2019-04-08 | End: 2019-07-25 | Stop reason: SDUPTHER

## 2019-04-08 RX ORDER — METOPROLOL SUCCINATE 50 MG/1
50 TABLET, EXTENDED RELEASE ORAL DAILY
Qty: 42 TABLET | Refills: 0 | Status: SHIPPED | OUTPATIENT
Start: 2019-04-08 | End: 2019-04-08 | Stop reason: SDUPTHER

## 2019-04-08 RX ORDER — METOPROLOL SUCCINATE 50 MG/1
TABLET, EXTENDED RELEASE ORAL
Qty: 270 TABLET | Refills: 0 | Status: SHIPPED | OUTPATIENT
Start: 2019-04-08 | End: 2019-04-08 | Stop reason: SDUPTHER

## 2019-04-08 RX ORDER — LOSARTAN POTASSIUM AND HYDROCHLOROTHIAZIDE 25; 100 MG/1; MG/1
TABLET ORAL
Qty: 90 TABLET | Refills: 0 | Status: SHIPPED | OUTPATIENT
Start: 2019-04-08 | End: 2019-04-08 | Stop reason: SDUPTHER

## 2019-04-08 NOTE — TELEPHONE ENCOUNTER
----- Message from Kali Jules sent at 4/8/2019  3:12 PM CDT -----  Contact: Self: [phone]  Medication refill: Prior Auth needed    Patient is currently out of town visiting her daughter, she left her medication at home and would need at least a two week supply to hold her over till she comes home. Please call patient with any questions.      #losartan-hydrochlorothiazide 100-25 mg (HYZAAR) 100-25 mg per tablet#  #metoprolol succinate (TOPROL-XL) 50 MG 24 hr tablet    [facility] - [place] - [address] [place] [facility] [place] [address]  [address] [address]  Phone: [phone] Fax: [phone]    Thank you

## 2019-05-13 ENCOUNTER — HOSPITAL ENCOUNTER (OUTPATIENT)
Dept: RADIOLOGY | Facility: HOSPITAL | Age: 74
Discharge: HOME | End: 2019-05-13
Attending: FAMILY MEDICINE
Payer: MEDICARE

## 2019-05-13 ENCOUNTER — OFFICE VISIT (OUTPATIENT)
Dept: FAMILY MEDICINE | Facility: CLINIC | Age: 74
End: 2019-05-13
Payer: MEDICARE

## 2019-05-13 VITALS
OXYGEN SATURATION: 95 % | DIASTOLIC BLOOD PRESSURE: 60 MMHG | HEIGHT: 60 IN | SYSTOLIC BLOOD PRESSURE: 100 MMHG | HEART RATE: 73 BPM | BODY MASS INDEX: 17.62 KG/M2 | TEMPERATURE: 100 F | WEIGHT: 89.75 LBS

## 2019-05-13 DIAGNOSIS — M25.551 RIGHT HIP PAIN: ICD-10-CM

## 2019-05-13 DIAGNOSIS — F13.20 SEDATIVE HYPNOTIC OR ANXIOLYTIC DEPENDENCE: Chronic | ICD-10-CM

## 2019-05-13 DIAGNOSIS — M54.31 RIGHT SCIATIC NERVE PAIN: ICD-10-CM

## 2019-05-13 DIAGNOSIS — I70.1 RENAL ARTERY STENOSIS: Chronic | ICD-10-CM

## 2019-05-13 DIAGNOSIS — J43.2 CENTRILOBULAR EMPHYSEMA: Chronic | ICD-10-CM

## 2019-05-13 DIAGNOSIS — M81.0 OSTEOPOROSIS, UNSPECIFIED OSTEOPOROSIS TYPE, UNSPECIFIED PATHOLOGICAL FRACTURE PRESENCE: Chronic | ICD-10-CM

## 2019-05-13 DIAGNOSIS — J44.9 CHRONIC OBSTRUCTIVE PULMONARY DISEASE, UNSPECIFIED COPD TYPE: Chronic | ICD-10-CM

## 2019-05-13 DIAGNOSIS — R10.9 RIGHT FLANK PAIN: Primary | ICD-10-CM

## 2019-05-13 DIAGNOSIS — I10 ESSENTIAL HYPERTENSION: Chronic | ICD-10-CM

## 2019-05-13 DIAGNOSIS — E78.2 MIXED HYPERLIPIDEMIA: Chronic | ICD-10-CM

## 2019-05-13 PROCEDURE — 1101F PR PT FALLS ASSESS DOC 0-1 FALLS W/OUT INJ PAST YR: ICD-10-PCS | Mod: CPTII,S$GLB,, | Performed by: FAMILY MEDICINE

## 2019-05-13 PROCEDURE — 99499 RISK ADDL DX/OHS AUDIT: ICD-10-PCS | Mod: S$GLB,,, | Performed by: FAMILY MEDICINE

## 2019-05-13 PROCEDURE — 1101F PT FALLS ASSESS-DOCD LE1/YR: CPT | Mod: CPTII,S$GLB,, | Performed by: FAMILY MEDICINE

## 2019-05-13 PROCEDURE — 3074F PR MOST RECENT SYSTOLIC BLOOD PRESSURE < 130 MM HG: ICD-10-PCS | Mod: CPTII,S$GLB,, | Performed by: FAMILY MEDICINE

## 2019-05-13 PROCEDURE — 3078F PR MOST RECENT DIASTOLIC BLOOD PRESSURE < 80 MM HG: ICD-10-PCS | Mod: CPTII,S$GLB,, | Performed by: FAMILY MEDICINE

## 2019-05-13 PROCEDURE — 73502 X-RAY EXAM HIP UNI 2-3 VIEWS: CPT | Mod: TC,FY,PO,RT

## 2019-05-13 PROCEDURE — 73502 X-RAY EXAM HIP UNI 2-3 VIEWS: CPT | Mod: 26,RT,, | Performed by: RADIOLOGY

## 2019-05-13 PROCEDURE — 96372 THER/PROPH/DIAG INJ SC/IM: CPT | Mod: S$GLB,,, | Performed by: FAMILY MEDICINE

## 2019-05-13 PROCEDURE — 73502 XR HIP 2 VIEW RIGHT: ICD-10-PCS | Mod: 26,RT,, | Performed by: RADIOLOGY

## 2019-05-13 PROCEDURE — 99999 PR PBB SHADOW E&M-EST. PATIENT-LVL III: CPT | Mod: PBBFAC,,, | Performed by: FAMILY MEDICINE

## 2019-05-13 PROCEDURE — 99214 PR OFFICE/OUTPT VISIT, EST, LEVL IV, 30-39 MIN: ICD-10-PCS | Mod: 25,S$GLB,, | Performed by: FAMILY MEDICINE

## 2019-05-13 PROCEDURE — 96372 PR INJECTION,THERAP/PROPH/DIAG2ST, IM OR SUBCUT: ICD-10-PCS | Mod: S$GLB,,, | Performed by: FAMILY MEDICINE

## 2019-05-13 PROCEDURE — 3078F DIAST BP <80 MM HG: CPT | Mod: CPTII,S$GLB,, | Performed by: FAMILY MEDICINE

## 2019-05-13 PROCEDURE — 3074F SYST BP LT 130 MM HG: CPT | Mod: CPTII,S$GLB,, | Performed by: FAMILY MEDICINE

## 2019-05-13 PROCEDURE — 99999 PR PBB SHADOW E&M-EST. PATIENT-LVL III: ICD-10-PCS | Mod: PBBFAC,,, | Performed by: FAMILY MEDICINE

## 2019-05-13 PROCEDURE — 99499 UNLISTED E&M SERVICE: CPT | Mod: S$GLB,,, | Performed by: FAMILY MEDICINE

## 2019-05-13 PROCEDURE — 99214 OFFICE O/P EST MOD 30 MIN: CPT | Mod: 25,S$GLB,, | Performed by: FAMILY MEDICINE

## 2019-05-13 RX ORDER — DEXAMETHASONE SODIUM PHOSPHATE 4 MG/ML
8 INJECTION, SOLUTION INTRA-ARTICULAR; INTRALESIONAL; INTRAMUSCULAR; INTRAVENOUS; SOFT TISSUE ONCE
Status: COMPLETED | OUTPATIENT
Start: 2019-05-13 | End: 2019-05-13

## 2019-05-13 RX ADMIN — DEXAMETHASONE SODIUM PHOSPHATE 8 MG: 4 INJECTION, SOLUTION INTRA-ARTICULAR; INTRALESIONAL; INTRAMUSCULAR; INTRAVENOUS; SOFT TISSUE at 02:05

## 2019-05-13 NOTE — PROGRESS NOTES
No fracture or dislocation  Fecal material noted on x-ray; I recommend increasing fiber intake and drinking at least 64 ounces of water / day.

## 2019-05-13 NOTE — PROGRESS NOTES
Routine Office Visit    Patient Name: Ariadna Villegas    : 1945  MRN: [id number]    Subjective:  Ariadna is a 74 y.o. female who presents today for     1. Right sided hip / butt pain - started yesterday. Pain starts on her back and side and radiates down her leg. Pain is worse with movement. Pt is not able to stand / walk or sit without pain. No falls / injuries. No problems with urination. Pain is described as an aching pain. No radiculopathy. Regular bowel movements. No problems with urination. She is under pain management and does not feel that her current oxycodone is helpful for pain.     Review of Systems   Constitutional: Negative for chills and fever.   HENT: Negative for congestion.    Eyes: Negative for blurred vision.   Respiratory: Negative for cough.    Cardiovascular: Negative for chest pain.   Gastrointestinal: Negative for abdominal pain, constipation, diarrhea, heartburn, nausea and vomiting.   Genitourinary: Negative for dysuria.   Musculoskeletal: Negative for myalgias.   Skin: Negative for itching and rash.   Neurological: Negative for dizziness and headaches.   Psychiatric/Behavioral: Negative for depression.       Active Problem List  Patient Active Problem List   Diagnosis    Renal artery stenosis    DDD (degenerative disc disease), lumbar    Cervicalgia    Insomnia    Mastoid pain    Osteoporosis    Hyperlipidemia    Thoracolumbar back pain    Generalized osteoarthrosis, unspecified site    BPV (benign positional vertigo)    Hypovitaminosis D    CKD (chronic kidney disease) stage 3, GFR 30-59 ml/min    Chest pain    Essential hypertension    COPD (chronic obstructive pulmonary disease)    Fatigue    Sedative hypnotic or anxiolytic dependence    B12 deficiency    Pulmonary nodule    Centrilobular emphysema    Lung mass    Non-small cell lung cancer    Squamous cell lung cancer       Past Surgical History  Past Surgical History:   Procedure Laterality Date     Biopsy-Lung N/A 2018    Performed by [facility] at [facility] OR 2ND FLR     x3      HYSTERECTOMY      LUNG CANCER SURGERY Right 2019    LYMPHADENECTOMY Right 2019    [facility]    PARTIAL HYSTERECTOMY      , after her third     SALPINGOOPHORECTOMY      , for a ovarian cyst    VATS, WITH WEDGE RESECTION, LUNG Right 2019    [facility]       Family History  Family History   Problem Relation Age of Onset    Heart disease Mother     Diabetes Mother     Stroke Father     Heart disease Sister     Anesthesia problems Neg Hx        Social History  Social History     Socioeconomic History    Marital status:      Spouse name: Not on file    Number of children: Not on file    Years of education: Not on file    Highest education level: Not on file   Occupational History    Occupation: Retired   Social Needs    Financial resource strain: Not on file    Food insecurity:     Worry: Not on file     Inability: Not on file    Transportation needs:     Medical: Not on file     Non-medical: Not on file   Tobacco Use    Smoking status: Former Smoker     Packs/day: 0.15     Years: 60.00     Pack years: 9.00     Types: Cigarettes    Smokeless tobacco: Never Used    Tobacco comment: quit x 2-3 wks. ago   Substance and Sexual Activity    Alcohol use: Yes     Comment: socially    Drug use: No    Sexual activity: Never     Partners: Male     Comment:    Lifestyle    Physical activity:     Days per week: Not on file     Minutes per session: Not on file    Stress: Not on file   Relationships    Social connections:     Talks on phone: Not on file     Gets together: Not on file     Attends Orthodox service: Not on file     Active member of club or organization: Not on file     Attends meetings of clubs or organizations: Not on file     Relationship status: Not on file    Other Topics Concern    Not on file   Social History Narrative    Not on file       Medications and Allergies  Reviewed and updated.   Current Outpatient Medications   Medication Sig    aspirin (ECOTRIN) 81 MG EC tablet Take 1 tablet (81 mg total) by mouth once daily.    gabapentin (NEURONTIN) 300 MG capsule Take 1 capsule (300 mg total) by mouth 3 (three) times daily. Start by taking one capsule at night and slowly increasing to 3 times daily.    losartan-hydrochlorothiazide 100-25 mg (HYZAAR) 100-25 mg per tablet Take 1 tablet by mouth once daily.    metoprolol succinate (TOPROL-XL) 50 MG 24 hr tablet Take 1 tablet (50 mg total) by mouth once daily.    oxyCODONE-acetaminophen (PERCOCET) 5-325 mg per tablet Take 1 tablet by mouth every 4 to 6 hours as needed for Pain (Pain related to surgery for cancer).    prochlorperazine (COMPAZINE) 10 MG tablet TAKE 1 TABLET BY MOUTH EVERY 6 HOURS AS NEEDED    rosuvastatin (CRESTOR) 20 MG tablet Take 1 tablet (20 mg total) by mouth once daily. (Patient taking differently: Take 20 mg by mouth every morning. )    sertraline (ZOLOFT) 50 MG tablet TAKE 1 TABLET(50 MG) BY MOUTH EVERY DAY (Patient taking differently: Take 50 mg by mouth every evening. TAKE 1 TABLET(50 MG) BY MOUTH EVERY DAY)     Current Facility-Administered Medications   Medication    cyanocobalamin injection 100 mcg       Physical Exam  /60 (BP Location: Right arm, Patient Position: Sitting, BP Method: Medium (Manual))   Pulse 73   Temp 99.5 °F (37.5 °C) (Oral)   Ht 5' (1.524 m)   Wt 40.7 kg (89 lb 11.6 oz)   SpO2 95%   BMI 17.52 kg/m²   Physical Exam   Constitutional: She is oriented to person, place, and time. She appears well-developed and well-nourished.   HENT:   Head: Normocephalic and atraumatic.   Eyes: Pupils are equal, round, and reactive to light. Conjunctivae and EOM are normal.   Neck: Normal range of motion. Neck supple.   Cardiovascular: Normal rate, regular rhythm and normal  heart sounds. Exam reveals no gallop and no friction rub.   No murmur heard.  Pulmonary/Chest: Breath sounds normal. No respiratory distress.   Abdominal: Soft. Bowel sounds are normal. She exhibits no distension. There is no tenderness.   Musculoskeletal: Normal range of motion.   Lymphadenopathy:     She has no cervical adenopathy.   Neurological: She is alert and oriented to person, place, and time.   Skin: Skin is warm.   Psychiatric: She has a normal mood and affect.         Assessment/Plan:  Ariadna Villegas is a 74 y.o. female who presents today for :    Problem List Items Addressed This Visit        Psychiatric    Sedative hypnotic or anxiolytic dependence (Chronic)  Noted in chart         Pulmonary    Centrilobular emphysema (Chronic)    COPD (chronic obstructive pulmonary disease) (Chronic)    Overview     Mild per pft.  +emphysematous changes on ct.    Noted in chart  Continue current regimen             Cardiac/Vascular    Essential hypertension (Chronic)  The current medical regimen is effective;  continue present plan and medications.      Hyperlipidemia (Chronic)  The current medical regimen is effective;  continue present plan and medications.      Renal artery stenosis (Chronic)  Noted in chart          Orthopedic    Osteoporosis (Chronic)  Noted in chart        Other Visit Diagnoses     Right flank pain    -  Primary    Relevant Orders    Urinalysis (Completed)    Urine culture    Right hip pain        Relevant Orders    X-Ray Hip 2 View Right (Completed)    Right sciatic nerve pain        Relevant Medications    dexamethasone injection 8 mg (Completed)  Suspect sciatic nerve pain as cause of pain although pt does have hx of cancer and pain may be from bone pain; may need follow-up with heme/onc   Recommend stretching for sciatic nerve  F/u with x-ray and UA  Pt has pain medication; continue prn                 Follow up if symptoms worsen or fail to improve.

## 2019-05-17 ENCOUNTER — HOSPITAL ENCOUNTER (OUTPATIENT)
Dept: RADIOLOGY | Facility: HOSPITAL | Age: 74
Discharge: HOME | End: 2019-05-17
Attending: INTERNAL MEDICINE
Payer: MEDICARE

## 2019-05-17 ENCOUNTER — OFFICE VISIT (OUTPATIENT)
Dept: FAMILY MEDICINE | Facility: CLINIC | Age: 74
End: 2019-05-17
Payer: MEDICARE

## 2019-05-17 VITALS
TEMPERATURE: 98 F | HEIGHT: 60 IN | BODY MASS INDEX: 17.28 KG/M2 | WEIGHT: 88 LBS | SYSTOLIC BLOOD PRESSURE: 118 MMHG | DIASTOLIC BLOOD PRESSURE: 60 MMHG | HEART RATE: 84 BPM | OXYGEN SATURATION: 97 %

## 2019-05-17 DIAGNOSIS — M54.50 LOW BACK PAIN, NON-SPECIFIC: ICD-10-CM

## 2019-05-17 DIAGNOSIS — M54.41 ACUTE RIGHT-SIDED LOW BACK PAIN WITH RIGHT-SIDED SCIATICA: Primary | ICD-10-CM

## 2019-05-17 DIAGNOSIS — C34.91 NON-SMALL CELL CANCER OF RIGHT LUNG: ICD-10-CM

## 2019-05-17 PROCEDURE — 99214 PR OFFICE/OUTPT VISIT, EST, LEVL IV, 30-39 MIN: ICD-10-PCS | Mod: S$GLB,,, | Performed by: INTERNAL MEDICINE

## 2019-05-17 PROCEDURE — 72100 XR LUMBAR SPINE AP AND LATERAL: ICD-10-PCS | Mod: 26,,, | Performed by: RADIOLOGY

## 2019-05-17 PROCEDURE — 99214 OFFICE O/P EST MOD 30 MIN: CPT | Mod: S$GLB,,, | Performed by: INTERNAL MEDICINE

## 2019-05-17 PROCEDURE — 1101F PR PT FALLS ASSESS DOC 0-1 FALLS W/OUT INJ PAST YR: ICD-10-PCS | Mod: CPTII,S$GLB,, | Performed by: INTERNAL MEDICINE

## 2019-05-17 PROCEDURE — 3078F PR MOST RECENT DIASTOLIC BLOOD PRESSURE < 80 MM HG: ICD-10-PCS | Mod: CPTII,S$GLB,, | Performed by: INTERNAL MEDICINE

## 2019-05-17 PROCEDURE — 99999 PR PBB SHADOW E&M-EST. PATIENT-LVL IV: ICD-10-PCS | Mod: PBBFAC,,, | Performed by: INTERNAL MEDICINE

## 2019-05-17 PROCEDURE — 99999 PR PBB SHADOW E&M-EST. PATIENT-LVL IV: CPT | Mod: PBBFAC,,, | Performed by: INTERNAL MEDICINE

## 2019-05-17 PROCEDURE — 72100 X-RAY EXAM L-S SPINE 2/3 VWS: CPT | Mod: 26,,, | Performed by: RADIOLOGY

## 2019-05-17 PROCEDURE — 3074F SYST BP LT 130 MM HG: CPT | Mod: CPTII,S$GLB,, | Performed by: INTERNAL MEDICINE

## 2019-05-17 PROCEDURE — 3078F DIAST BP <80 MM HG: CPT | Mod: CPTII,S$GLB,, | Performed by: INTERNAL MEDICINE

## 2019-05-17 PROCEDURE — 72100 X-RAY EXAM L-S SPINE 2/3 VWS: CPT | Mod: TC,FY,PO

## 2019-05-17 PROCEDURE — 3074F PR MOST RECENT SYSTOLIC BLOOD PRESSURE < 130 MM HG: ICD-10-PCS | Mod: CPTII,S$GLB,, | Performed by: INTERNAL MEDICINE

## 2019-05-17 PROCEDURE — 1101F PT FALLS ASSESS-DOCD LE1/YR: CPT | Mod: CPTII,S$GLB,, | Performed by: INTERNAL MEDICINE

## 2019-05-17 RX ORDER — OXYCODONE HYDROCHLORIDE 5 MG/1
TABLET ORAL
Refills: 0 | COMMUNITY
Start: 2019-05-03 | End: 2019-08-15

## 2019-05-17 RX ORDER — METHYLPREDNISOLONE 4 MG/1
TABLET ORAL
Qty: 1 PACKAGE | Refills: 0 | Status: SHIPPED | OUTPATIENT
Start: 2019-05-17 | End: 2019-05-27

## 2019-05-17 NOTE — PROGRESS NOTES
This note was created by combination of typed  and Dragon dictation.  Transcription errors may be present.  If there are any questions, please contact me.    Assessment / Plan:   Acute right-sided low back pain with right-sided sciatica  Low back pain, non-specific  Non-small cell cancer of right lung  -on sits in Sunday, worsening, has had recent evaluation, x-ray of the hip without pathology, did not really respond to injection of steroid but I will try her with the oral steroid as I think it will last longer.  No evidence of rash IE does not appear to be shingles, she has had shingles before and does not feel like it.  She had a urine sample which was unremarkable and she is not having any flank tenderness today.  She sees Dr. Cazares for pain management, I have asked her to contact his office for more acute follow-up, if this does not improve consider MRI lumbar spine.  With history of lung cancer, checking x-ray of the lumbar spine rule out lytic lesions, rule out compression fracture  -     methylPREDNISolone (MEDROL DOSEPACK) 4 mg tablet; use as directed  Dispense: 1 Package; Refill: 0  -     X-Ray Lumbar Spine Ap And Lateral; Future; Expected date: 05/17/2019    There are no discontinued medications.    meds sent this encounter:  Medications Ordered This Encounter   Medications    methylPREDNISolone (MEDROL DOSEPACK) 4 mg tablet     Sig: use as directed     Dispense:  1 Package     Refill:  0       Follow Up: No follow-ups on file.      Subjective:     Chief Complaint   Patient presents with    Flank Pain     right side    Back Pain     lower       HPI  Ariadna is a 74 y.o. female, last appointment with this clinic was 5/13/2019.    No LMP recorded. Patient has had a hysterectomy.    Seen on Tue, XR hip showed constipation.  Hydrating without relief.  Pain is worsening. Pin in the right outer hip and the lower back as well. It's a constant pain, hard to get out of bed. Normal urination normal BM  without pain. No blood.  No fever no chills.     Denies abd pain.    Was given steroid injection at OV last time pt notes no relief with this.    Has chronic back pain-pointing towards the upper lumbar/lower thoracic back. This is different than her current back pain.  Chronic narcotic therapy.  Dr. Cazares.    Her current symptoms initially started on the left side and eventually migrated to the rigth side and the back.      Sx started on Sunday.  No visible rash.  She has had shingles before and feels like this is very different from previous episode.    Patient Care Team:  Kirti Morris MD as PCP - General (Internal Medicine)  Jair Smiley MD as Consulting Physician (INTERVENTIONAL CARDIOLOGY)    Patient Active Problem List    Diagnosis Date Noted    Squamous cell lung cancer 01/16/2019    Non-small cell lung cancer 12/26/2018    Lung mass 11/27/2018    Centrilobular emphysema     Pulmonary nodule 12/08/2017     rul nodule noted since 12/2017.  Gradually enlarged with ct 9/18.        B12 deficiency 08/30/2016    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    Fatigue 05/12/2015    Chest pain 01/12/2015    Essential hypertension 01/12/2015    COPD (chronic obstructive pulmonary disease) 01/12/2015     Mild per pft.  +emphysematous changes on ct.      CKD (chronic kidney disease) stage 3, GFR 30-59 ml/min 11/11/2014    Hypovitaminosis D 02/27/2014    BPV (benign positional vertigo) 02/18/2014    Thoracolumbar back pain 11/16/2012    Osteoporosis 11/05/2012    Hyperlipidemia 11/05/2012    DDD (degenerative disc disease), lumbar 10/30/2012    Cervicalgia 10/30/2012    Insomnia 10/30/2012    Mastoid pain 10/30/2012    Renal artery stenosis 08/20/2012    Generalized osteoarthrosis, unspecified site 05/21/2012       PAST MEDICAL HISTORY:  Past Medical History:   Diagnosis Date    Arthritis     neck and back    COPD (chronic obstructive pulmonary disease)     Malignant hypertension      Non-small cell lung cancer 2018    Osteoporosis, postmenopausal     Tobacco use disorder        PAST SURGICAL HISTORY:  Past Surgical History:   Procedure Laterality Date    Biopsy-Lung N/A 2018    Performed by [facility] at [facility] OR [address] [place] [address]     x3      HYSTERECTOMY      LUNG CANCER SURGERY Right 2019    LYMPHADENECTOMY Right 2019    Performed by Celso Jewell MD at [facility] OR [address] [place] [address]    PARTIAL HYSTERECTOMY      , after her third     SALPINGOOPHORECTOMY      , for a ovarian cyst    VATS, WITH WEDGE RESECTION, LUNG Right 2019    Performed by Cel[facility] Jewell [facility] at [facility] OR [address] [place] [address]       SOCIAL HISTORY:  Social History     Socioeconomic History    Marital status:      Spouse name: Not on file    Number of children: Not on file    Years of education: Not on file    Highest education level: Not on file   Occupational History    Occupation: Retired   Social Needs    Financial resource strain: Not on file    Food insecurity:     Worry: Not on file     Inability: Not on file    Transportation needs:     Medical: Not on file     Non-medical: Not on file   Tobacco Use    Smoking status: Former Smoker     Packs/day: 0.15     Years: 60.00     Pack years: 9.00     Types: Cigarettes    Smokeless tobacco: Never Used    Tobacco comment: quit x 2-3 wks. ago   Substance and Sexual Activity    Alcohol use: Yes     Comment: socially    Drug use: No    Sexual activity: Never     Partners: Male     Comment:    Lifestyle    Physical activity:     Days per week: Not on file     Minutes per session: Not on file    Stress: Not on file   Relationships    Social connections:     Talks on phone: Not on file     Gets together: Not on file     Attends Quaker service: Not on file     Active member of club or organization: Not on file     Attends meetings of clubs or organizations: Not on file     Relationship status: Not on file    Other Topics Concern    Not on file   Social History Narrative    Not on file        ALLERGIES AND MEDICATIONS: updated and reviewed.  Review of patient's allergies indicates:   Allergen Reactions    Lunesta [eszopiclone] Other (See Comments)     Sleep walking     Current Outpatient Medications   Medication Sig Dispense Refill    aspirin (ECOTRIN) 81 MG EC tablet Take 1 tablet (81 mg total) by mouth once daily.      gabapentin (NEURONTIN) 300 MG capsule Take 1 capsule (300 mg total) by mouth 3 (three) times daily. Start by taking one capsule at night and slowly increasing to 3 times daily. 90 capsule 11    losartan-hydrochlorothiazide 100-25 mg (HYZAAR) 100-25 mg per tablet Take 1 tablet by mouth once daily. 14 tablet 0    metoprolol succinate (TOPROL-XL) 50 MG 24 hr tablet Take 1 tablet (50 mg total) by mouth once daily. 14 tablet 0    oxyCODONE-acetaminophen (PERCOCET) 5-325 mg per tablet Take 1 tablet by mouth every 4 to 6 hours as needed for Pain (Pain related to surgery for cancer). 28 tablet 0    prochlorperazine (COMPAZINE) 10 MG tablet TAKE 1 TABLET BY MOUTH EVERY 6 HOURS AS NEEDED 337 tablet 1    rosuvastatin (CRESTOR) 20 MG tablet Take 1 tablet (20 mg total) by mouth once daily. (Patient taking differently: Take 20 mg by mouth every morning. ) 30 tablet 11    sertraline (ZOLOFT) 50 MG tablet TAKE 1 TABLET(50 MG) BY MOUTH EVERY DAY (Patient taking differently: Take 50 mg by mouth every evening. TAKE 1 TABLET(50 MG) BY MOUTH EVERY DAY) 90 tablet 1    oxyCODONE (ROXICODONE) 5 MG immediate release tablet TK 1 T PO Q 6 H PRN  0     Current Facility-Administered Medications   Medication Dose Route Frequency Provider Last Rate Last Dose    cyanocobalamin injection 100 mcg  100 mcg Intramuscular Q30 Days MENDEL Wolf MD   100 mcg at 05/08/17 1509       Review of Systems   All other systems reviewed and are negative.      Objective:   Physical Exam   Vitals:    05/17/19 0858   BP: 118/60    BP Location: Left arm   Patient Position: Sitting   BP Method: Small (Manual)   Pulse: 84   Temp: 98 °F (36.7 °C)   TempSrc: Oral   SpO2: 97%   Weight: 39.9 kg (88 lb)   Height: 5' (1.524 m)    Body mass index is 17.19 kg/m².  Weight: 39.9 kg (88 lb)   Height: 5' (152.4 cm)     Physical Exam   Constitutional: She is oriented to person, place, and time. She appears well-developed and well-nourished. She appears distressed.   Uncomfortable   HENT:   Head: Normocephalic and atraumatic.   Eyes: No scleral icterus.   Pulmonary/Chest: Effort normal.   Abdominal: Soft. She exhibits no distension and no mass. There is no tenderness. There is no rebound and no guarding.   Musculoskeletal:   The thoracic spine is unremarkable, the lumbar spine is actually unremarkable, some mild tenderness on the right lateral lower back without deformity or swelling.  She is more tender in the upper and mid buttock section, without deformity or asymmetry or induration.  Log rolling does not elicit pain, hip inversion and eversion elicits discomfort in the buttock area posteriorly.  Straight leg raise with pain in the buttock   Neurological: She is alert and oriented to person, place, and time.   Skin: Skin is warm and dry.   Psychiatric: She has a normal mood and affect. Her behavior is normal. Thought content normal.

## 2019-05-17 NOTE — PROGRESS NOTES
Spoke with mery daughter about findings - possible compression fracture.  She sees Dr. Cazares she should see him in follow up to address this consider MRI  Will send copy to Dr. Cazares

## 2019-05-20 ENCOUNTER — TELEPHONE (OUTPATIENT)
Dept: FAMILY MEDICINE | Facility: CLINIC | Age: 74
End: 2019-05-20

## 2019-05-20 DIAGNOSIS — M54.50 LOW BACK PAIN, NON-SPECIFIC: ICD-10-CM

## 2019-05-20 RX ORDER — TIZANIDINE 2 MG/1
TABLET ORAL
Qty: 540 TABLET | Refills: 0 | OUTPATIENT
Start: 2019-05-20

## 2019-05-20 RX ORDER — TIZANIDINE 2 MG/1
4 TABLET ORAL EVERY 8 HOURS PRN
Qty: 30 TABLET | Refills: 0 | Status: SHIPPED | OUTPATIENT
Start: 2019-05-20 | End: 2019-05-27

## 2019-05-20 NOTE — TELEPHONE ENCOUNTER
----- Message from Sandy Austin sent at 5/20/2019  8:52 AM CDT -----  Contact: self [phone]  .Type: Patient Call Back    Who called: self     What is the request in detail: Pt states that she was seen in the office twice for back pain & s she's still having a lot of back pain. She's also requesting orders for an MRI.    Would the patient rather a call back or a response via [facility]?  Call back     Best call back number: [phone]

## 2019-05-20 NOTE — TELEPHONE ENCOUNTER
Spoken to patient's daughter. Advised her that patient needs to have the MRI done first then follow up dr. pandya afterwards. [facility] so if patient wants to see othro then they can there,.

## 2019-05-20 NOTE — TELEPHONE ENCOUNTER
Patient notified of information below and informed that she will be contacted by the hospital to schedule an appointment for the MRI.  Verbalized understanding and requested a prescription for pain medication.  Informed that her PCP must order any controlled medications and that she is out of the office today so her request will likely not be addressed until tomorrow.  Verbalized understanding and asked if a non-narcotic medication can be ordered as she takes her current pain medication two at a time and they still do not help.  Please advise.

## 2019-05-22 ENCOUNTER — HOSPITAL ENCOUNTER (OUTPATIENT)
Dept: RADIOLOGY | Facility: HOSPITAL | Age: 74
Discharge: HOME | End: 2019-05-22
Attending: INTERNAL MEDICINE
Payer: MEDICARE

## 2019-05-22 DIAGNOSIS — M54.50 LOW BACK PAIN, NON-SPECIFIC: ICD-10-CM

## 2019-05-22 PROCEDURE — 72148 MRI LUMBAR SPINE W/O DYE: CPT | Mod: TC

## 2019-05-22 PROCEDURE — 72148 MRI LUMBAR SPINE WITHOUT CONTRAST: ICD-10-PCS | Mod: 26,,, | Performed by: RADIOLOGY

## 2019-05-22 PROCEDURE — 72148 MRI LUMBAR SPINE W/O DYE: CPT | Mod: 26,,, | Performed by: RADIOLOGY

## 2019-05-23 ENCOUNTER — HOSPITAL ENCOUNTER (EMERGENCY)
Facility: HOSPITAL | Age: 74
Discharge: HOME | End: 2019-05-23
Attending: EMERGENCY MEDICINE
Payer: MEDICARE

## 2019-05-23 ENCOUNTER — TELEPHONE (OUTPATIENT)
Dept: FAMILY MEDICINE | Facility: CLINIC | Age: 74
End: 2019-05-23

## 2019-05-23 VITALS
DIASTOLIC BLOOD PRESSURE: 65 MMHG | SYSTOLIC BLOOD PRESSURE: 168 MMHG | BODY MASS INDEX: 16.88 KG/M2 | TEMPERATURE: 98 F | HEIGHT: 60 IN | RESPIRATION RATE: 18 BRPM | HEART RATE: 84 BPM | WEIGHT: 86 LBS | OXYGEN SATURATION: 95 %

## 2019-05-23 DIAGNOSIS — S32.020A CLOSED COMPRESSION FRACTURE OF SECOND LUMBAR VERTEBRA, INITIAL ENCOUNTER: Primary | ICD-10-CM

## 2019-05-23 DIAGNOSIS — M54.32 SCIATICA, LEFT SIDE: ICD-10-CM

## 2019-05-23 DIAGNOSIS — D09.9 SQUAMOUS CELL CARCINOMA IN SITU: ICD-10-CM

## 2019-05-23 DIAGNOSIS — M62.838 MUSCLE SPASM: ICD-10-CM

## 2019-05-23 PROCEDURE — 25000003 PHARM REV CODE 250: Performed by: EMERGENCY MEDICINE

## 2019-05-23 PROCEDURE — 63600175 PHARM REV CODE 636 W HCPCS: Performed by: EMERGENCY MEDICINE

## 2019-05-23 PROCEDURE — 96372 THER/PROPH/DIAG INJ SC/IM: CPT

## 2019-05-23 PROCEDURE — 99284 EMERGENCY DEPT VISIT MOD MDM: CPT | Mod: 25

## 2019-05-23 RX ORDER — OXYCODONE AND ACETAMINOPHEN 5; 325 MG/1; MG/1
1 TABLET ORAL
Qty: 28 TABLET | Refills: 0 | Status: SHIPPED | OUTPATIENT
Start: 2019-05-23 | End: 2019-05-27

## 2019-05-23 RX ORDER — MORPHINE SULFATE 10 MG/ML
4 INJECTION INTRAMUSCULAR; INTRAVENOUS; SUBCUTANEOUS
Status: COMPLETED | OUTPATIENT
Start: 2019-05-23 | End: 2019-05-23

## 2019-05-23 RX ORDER — ONDANSETRON 4 MG/1
4 TABLET, ORALLY DISINTEGRATING ORAL
Status: COMPLETED | OUTPATIENT
Start: 2019-05-23 | End: 2019-05-23

## 2019-05-23 RX ORDER — MELOXICAM 7.5 MG/1
7.5 TABLET ORAL DAILY
Qty: 20 TABLET | Refills: 0 | Status: SHIPPED | OUTPATIENT
Start: 2019-05-23 | End: 2019-08-15

## 2019-05-23 RX ADMIN — ONDANSETRON 4 MG: 4 TABLET, ORALLY DISINTEGRATING ORAL at 12:05

## 2019-05-23 RX ADMIN — MORPHINE SULFATE 4 MG: 10 INJECTION INTRAVENOUS at 12:05

## 2019-05-23 NOTE — ED TRIAGE NOTES
Pt cc Back Pain Pt states she is having severe low back pain. Pt states has seen pcp twice this week and had MRI here about 1700 hrs 05/22. Pt states pcp gave her muscle relaxer but does not know the name of it and did not bring. Pt states it is not helping.

## 2019-05-23 NOTE — TELEPHONE ENCOUNTER
I spoke with the daughter about the results of the MRI.  She is inquiring as to whether she needs to keep the appointment for tomorrow  I spoke with Dr. Morris.  She would like for the patient to keep the appointment to review the results and also to to ensure proper follow-up.  Please call patient/daughter-keep the appointment for tomorrow

## 2019-05-23 NOTE — ED PROVIDER NOTES
Encounter Date: 2019    SCRIBE #1 NOTE: I, Patt Ngo, am scribing for, and in the presence of,  Dr. Del Cid. I have scribed the following portions of the note - Other sections scribed: HPI,ROS,PE.       History     Chief Complaint   Patient presents with    Back Pain     pt states she is having severe low back pain. \[facility] [place] [facility]\"" has seen pcp twice this week and had MRI here about 1700 hrs . \[facility] [place] [facility]\"" pcp gave her muscle relaxer but does not know the name of it and did not bring. \[facility] [place] [facility]\"" it is not helping     This patient is a 74 y.o. female with PMHx of osteoporosis, malignant HTN, arthritis, and Lung CA presenting to ED with complaints of low back pain x 3 days. Patient describes pain as grabbing. Patient reports having MRI at this [facility] on  @ 1730. MRI report showed mild L 2 compression fracture superior endplate.  No retropulsion.  No spinal cord impingement, masses, or infection.. Pt was seen by her PCP twice this week and was prescribed a muscle relaxer with no relief. Patient reports pain is continuously  worsening. Denies abdominal pain, nausea, vomiting, fever, CP, melena, numbness, tingling. Patient reports and involuntary BM that was 2 weeks ago. Denies taking any other medications for pain.      The history is provided by the patient. No  was used.     Review of patient's allergies indicates:   Allergen Reactions    Lunesta [eszopiclone] Other (See Comments)     Sleep walking     Past Medical History:   Diagnosis Date    Arthritis     neck and back    COPD (chronic obstructive pulmonary disease)     Malignant hypertension     Non-small cell lung cancer 2018    Osteoporosis, postmenopausal     Tobacco use disorder      Past Surgical History:   Procedure Laterality Date    Biopsy-Lung N/A 2018    Performed by [facility] at [facility] OR [facility]     x3      HYSTERECTOMY      LUNG CANCER SURGERY Right 2019    LYMPHADENECTOMY Right  2019    Performed by Cel[facility] Jewell [facility] at [facility] OR 2ND [facility]    PARTIAL HYSTERECTOMY      , after her third     SALPINGOOPHORECTOMY      , for a ovarian cyst    VATS, WITH WEDGE RESECTION, LUNG Right 2019    Performed by [facility]ell [facility] at [facility] OR 2ND [facility]     Family History   Problem Relation Age of Onset    Heart disease Mother     Diabetes Mother     Stroke Father     Heart disease Sister     Anesthesia problems Neg Hx      Social History     Tobacco Use    Smoking status: Former Smoker     Packs/day: 0.15     Years: 60.00     Pack years: 9.00     Types: Cigarettes    Smokeless tobacco: Never Used    Tobacco comment: quit x 2-3 wks. ago   Substance Use Topics    Alcohol use: Yes     Comment: socially    Drug use: No     Review of Systems   Constitutional: Negative for chills, diaphoresis, fatigue and fever.   HENT: Negative for congestion, sinus pain and sore throat.    Eyes: Negative for visual disturbance.   Respiratory: Negative for cough, shortness of breath, wheezing and stridor.    Cardiovascular: Negative for chest pain, palpitations and leg swelling.   Gastrointestinal: Negative for abdominal pain, diarrhea, nausea and vomiting.   Genitourinary: Negative for dysuria, flank pain and frequency.   Musculoskeletal: Positive for back pain (Lower). Negative for joint swelling.   Neurological: Negative for dizziness, seizures, syncope, facial asymmetry, speech difficulty, weakness, numbness and headaches.   Psychiatric/Behavioral: Negative for confusion.       Physical Exam     Initial Vitals [19 0011]   BP Pulse Resp Temp SpO2   (!) 181/78 84 20 98.1 °F (36.7 °C) 98 %      MAP       --         Physical Exam    Nursing note and vitals reviewed.  Constitutional: She appears well-developed and well-nourished. She is not diaphoretic. No distress.   Patient appears uncomfortable.   HENT:   Head: Normocephalic and atraumatic.   Nose: Nose normal.   Mouth/Throat:  Oropharynx is clear and moist.   Eyes: EOM are normal. Pupils are equal, round, and reactive to light. No scleral icterus.   Neck: Normal range of motion. Neck supple.   Cardiovascular: Normal rate, regular rhythm and normal heart sounds. Exam reveals no gallop and no friction rub.    No murmur heard.  Pulmonary/Chest: Breath sounds normal. No stridor. No respiratory distress. She has no wheezes. She has no rhonchi. She has no rales.   Abdominal: Soft. Bowel sounds are normal. There is no tenderness. There is no rebound and no guarding.   Musculoskeletal: Normal range of motion. She exhibits tenderness. She exhibits no edema.   Tender to Palpation lower lumbar spine and paraspinal muscles.  Muscle spasms to Lumbar and paraspinal region.  No spinal cord impingement or masses. Positive straight leg raise on left.  Negative straight leg raise on right.   Neurological: She is alert and oriented to person, place, and time. She has normal strength. No cranial nerve deficit or sensory deficit. GCS score is 15. GCS eye subscore is 4. GCS verbal subscore is 5. GCS motor subscore is 6.   Skin: Skin is warm and dry. Capillary refill takes less than 2 seconds.   Psychiatric: She has a normal mood and affect. Her behavior is normal. Judgment and thought content normal.         ED Course   Procedures  Labs Reviewed - No data to display       Imaging Results    None                     Scribe Attestation:   Scribe #1: I performed the above scribed service and the documentation accurately describes the services I performed. I attest to the accuracy of the note.               Clinical Impression:     1. Closed compression fracture of second lumbar vertebra, initial encounter    2. Muscle spasm    3. Sciatica, left side    4. Squamous cell carcinoma in situ            Disposition:   Disposition: Discharged  Condition: Stable                        Jose Roberto Del Cid MD  05/23/19 0057

## 2019-05-23 NOTE — TELEPHONE ENCOUNTER
How soon can we get her in for an OV to go over her MRI results- may use urgent slot    Kirti Morris MD

## 2019-05-24 ENCOUNTER — LAB VISIT (OUTPATIENT)
Dept: LAB | Facility: HOSPITAL | Age: 74
End: 2019-05-24
Attending: FAMILY MEDICINE
Payer: MEDICARE

## 2019-05-24 ENCOUNTER — OFFICE VISIT (OUTPATIENT)
Dept: FAMILY MEDICINE | Facility: CLINIC | Age: 74
End: 2019-05-24
Payer: MEDICARE

## 2019-05-24 VITALS
OXYGEN SATURATION: 99 % | TEMPERATURE: 98 F | DIASTOLIC BLOOD PRESSURE: 68 MMHG | RESPIRATION RATE: 16 BRPM | HEART RATE: 76 BPM | WEIGHT: 84.75 LBS | HEIGHT: 60 IN | SYSTOLIC BLOOD PRESSURE: 128 MMHG | BODY MASS INDEX: 16.64 KG/M2

## 2019-05-24 DIAGNOSIS — M54.50 THORACOLUMBAR BACK PAIN: ICD-10-CM

## 2019-05-24 DIAGNOSIS — M54.6 THORACOLUMBAR BACK PAIN: ICD-10-CM

## 2019-05-24 DIAGNOSIS — C34.91 NON-SMALL CELL CANCER OF RIGHT LUNG: Primary | ICD-10-CM

## 2019-05-24 DIAGNOSIS — R93.7 ABNORMAL MRI, SPINE: ICD-10-CM

## 2019-05-24 DIAGNOSIS — C34.91 NON-SMALL CELL CANCER OF RIGHT LUNG: ICD-10-CM

## 2019-05-24 LAB
ALBUMIN SERPL BCP-MCNC: 3.7 G/DL (ref 3.5–5.2)
ALP SERPL-CCNC: 149 U/L (ref 55–135)
ALT SERPL W/O P-5'-P-CCNC: 9 U/L (ref 10–44)
ANION GAP SERPL CALC-SCNC: 7 MMOL/L (ref 8–16)
AST SERPL-CCNC: 16 U/L (ref 10–40)
BASOPHILS # BLD AUTO: 0.05 K/UL (ref 0–0.2)
BASOPHILS NFR BLD: 0.5 % (ref 0–1.9)
BILIRUB SERPL-MCNC: 0.4 MG/DL (ref 0.1–1)
BUN SERPL-MCNC: 22 MG/DL (ref 8–23)
CALCIUM SERPL-MCNC: 11 MG/DL (ref 8.7–10.5)
CHLORIDE SERPL-SCNC: 99 MMOL/L (ref 95–110)
CO2 SERPL-SCNC: 26 MMOL/L (ref 23–29)
CREAT SERPL-MCNC: 1.1 MG/DL (ref 0.5–1.4)
DIFFERENTIAL METHOD: ABNORMAL
EOSINOPHIL # BLD AUTO: 0.1 K/UL (ref 0–0.5)
EOSINOPHIL NFR BLD: 0.9 % (ref 0–8)
ERYTHROCYTE [DISTWIDTH] IN BLOOD BY AUTOMATED COUNT: 14.2 % (ref 11.5–14.5)
EST. GFR  (AFRICAN AMERICAN): 56.8 ML/MIN/1.73 M^2
EST. GFR  (NON AFRICAN AMERICAN): 49.2 ML/MIN/1.73 M^2
GLUCOSE SERPL-MCNC: 105 MG/DL (ref 70–110)
HCT VFR BLD AUTO: 40.1 % (ref 37–48.5)
HGB BLD-MCNC: 12.9 G/DL (ref 12–16)
IMM GRANULOCYTES # BLD AUTO: 0.05 K/UL (ref 0–0.04)
IMM GRANULOCYTES NFR BLD AUTO: 0.5 % (ref 0–0.5)
LYMPHOCYTES # BLD AUTO: 2 K/UL (ref 1–4.8)
LYMPHOCYTES NFR BLD: 18.4 % (ref 18–48)
MCH RBC QN AUTO: 29.1 PG (ref 27–31)
MCHC RBC AUTO-ENTMCNC: 32.2 G/DL (ref 32–36)
MCV RBC AUTO: 90 FL (ref 82–98)
MONOCYTES # BLD AUTO: 0.7 K/UL (ref 0.3–1)
MONOCYTES NFR BLD: 6 % (ref 4–15)
NEUTROPHILS # BLD AUTO: 8 K/UL (ref 1.8–7.7)
NEUTROPHILS NFR BLD: 73.7 % (ref 38–73)
NRBC BLD-RTO: 0 /100 WBC
PLATELET # BLD AUTO: 374 K/UL (ref 150–350)
PMV BLD AUTO: 9.2 FL (ref 9.2–12.9)
POTASSIUM SERPL-SCNC: 5.5 MMOL/L (ref 3.5–5.1)
PROT SERPL-MCNC: 7.6 G/DL (ref 6–8.4)
RBC # BLD AUTO: 4.44 M/UL (ref 4–5.4)
SODIUM SERPL-SCNC: 132 MMOL/L (ref 136–145)
WBC # BLD AUTO: 10.87 K/UL (ref 3.9–12.7)

## 2019-05-24 PROCEDURE — 36415 COLL VENOUS BLD VENIPUNCTURE: CPT | Mod: PO

## 2019-05-24 PROCEDURE — 1101F PT FALLS ASSESS-DOCD LE1/YR: CPT | Mod: CPTII,S$GLB,, | Performed by: FAMILY MEDICINE

## 2019-05-24 PROCEDURE — 99999 PR PBB SHADOW E&M-EST. PATIENT-LVL V: ICD-10-PCS | Mod: PBBFAC,,, | Performed by: FAMILY MEDICINE

## 2019-05-24 PROCEDURE — 99214 OFFICE O/P EST MOD 30 MIN: CPT | Mod: S$GLB,,, | Performed by: FAMILY MEDICINE

## 2019-05-24 PROCEDURE — 80053 COMPREHEN METABOLIC PANEL: CPT

## 2019-05-24 PROCEDURE — 85025 COMPLETE CBC W/AUTO DIFF WBC: CPT

## 2019-05-24 PROCEDURE — 99214 PR OFFICE/OUTPT VISIT, EST, LEVL IV, 30-39 MIN: ICD-10-PCS | Mod: S$GLB,,, | Performed by: FAMILY MEDICINE

## 2019-05-24 PROCEDURE — 99999 PR PBB SHADOW E&M-EST. PATIENT-LVL V: CPT | Mod: PBBFAC,,, | Performed by: FAMILY MEDICINE

## 2019-05-24 PROCEDURE — 3078F DIAST BP <80 MM HG: CPT | Mod: CPTII,S$GLB,, | Performed by: FAMILY MEDICINE

## 2019-05-24 PROCEDURE — 3078F PR MOST RECENT DIASTOLIC BLOOD PRESSURE < 80 MM HG: ICD-10-PCS | Mod: CPTII,S$GLB,, | Performed by: FAMILY MEDICINE

## 2019-05-24 PROCEDURE — 3074F PR MOST RECENT SYSTOLIC BLOOD PRESSURE < 130 MM HG: ICD-10-PCS | Mod: CPTII,S$GLB,, | Performed by: FAMILY MEDICINE

## 2019-05-24 PROCEDURE — 3074F SYST BP LT 130 MM HG: CPT | Mod: CPTII,S$GLB,, | Performed by: FAMILY MEDICINE

## 2019-05-24 PROCEDURE — 1101F PR PT FALLS ASSESS DOC 0-1 FALLS W/OUT INJ PAST YR: ICD-10-PCS | Mod: CPTII,S$GLB,, | Performed by: FAMILY MEDICINE

## 2019-05-24 NOTE — PROGRESS NOTES
Chief Complaint   Patient presents with    Back Pain     lower       HPI  Ariadna Villegas is a 74 y.o. female with multiple medical diagnoses as listed in the medical history and problem list that presents for follow-up for low back pain    She has a hx of non small cell lung cancer that was treated with VATs resection    This has been worsening for the past two weeks, she has seen several of my partners and has had had an ER visit  for low back pain. She had an MRI that showed a compression fracture along with an area that is concerning for her cancer recurrence. She is in severe pain. She is under a pain contract w/ Dr. Cazares but has not notified him of the fracture. She is taking roxicodone and it is not helping.    PAST MEDICAL HISTORY:  Past Medical History:   Diagnosis Date    Arthritis     neck and back    COPD (chronic obstructive pulmonary disease)     Malignant hypertension     Non-small cell lung cancer 2018    Osteoporosis, postmenopausal     Tobacco use disorder        PAST SURGICAL HISTORY:  Past Surgical History:   Procedure Laterality Date    Biopsy-Lung N/A 2018    [facility]     x3      HYSTERECTOMY      LUNG CANCER SURGERY Right 2019    LYMPHADENECTOMY Right 2019    [facility]    PARTIAL HYSTERECTOMY      , after her third     SALPINGOOPHORECTOMY      , for a ovarian cyst    VATS, WITH WEDGE RESECTION, LUNG Right 2019    Performed by Celso Jewell MD at [facility] [address] [place] [address]       SOCIAL HISTORY:  Social History     Socioeconomic History    Marital status:      Spouse name: Not on file    Number of children: Not on file    Years of education: Not on file    Highest education level: Not on file   Occupational History    Occupation: Retired   Social Needs    Financial resource strain: Not on file    Food insecurity:     Worry:  Not on file     Inability: Not on file    Transportation needs:     Medical: Not on file     Non-medical: Not on file   Tobacco Use    Smoking status: Former Smoker     Packs/day: 0.15     Years: 60.00     Pack years: 9.00     Types: Cigarettes    Smokeless tobacco: Never Used    Tobacco comment: quit x 2-3 wks. ago   Substance and Sexual Activity    Alcohol use: Yes     Comment: socially    Drug use: No    Sexual activity: Never     Partners: Male     Comment:    Lifestyle    Physical activity:     Days per week: Not on file     Minutes per session: Not on file    Stress: Not on file   Relationships    Social connections:     Talks on phone: Not on file     Gets together: Not on file     Attends Scientologist service: Not on file     Active member of club or organization: Not on file     Attends meetings of clubs or organizations: Not on file     Relationship status: Not on file   Other Topics Concern    Not on file   Social History Narrative    Not on file       FAMILY HISTORY:  Family History   Problem Relation Age of Onset    Heart disease Mother     Diabetes Mother     Stroke Father     Heart disease Sister     Anesthesia problems Neg Hx        ALLERGIES AND MEDICATIONS: updated and reviewed.  Review of patient's allergies indicates:   Allergen Reactions    Lunesta [eszopiclone] Other (See Comments)     Sleep walking     Current Outpatient Medications   Medication Sig Dispense Refill    aspirin (ECOTRIN) 81 MG EC tablet Take 1 tablet (81 mg total) by mouth once daily.      methylPREDNISolone (MEDROL DOSEPACK) 4 mg tablet use as directed 1 Package 0    gabapentin (NEURONTIN) 300 MG capsule Take 1 capsule (300 mg total) by mouth 3 (three) times daily. Start by taking one capsule at night and slowly increasing to 3 times daily. 90 capsule 11    losartan-hydrochlorothiazide 100-25 mg (HYZAAR) 100-25 mg per tablet Take 1 tablet by mouth once daily. 14 tablet 0    meloxicam (MOBIC) 7.5 MG  tablet Take 1 tablet (7.5 mg total) by mouth once daily. 20 tablet 0    metoprolol succinate (TOPROL-XL) 50 MG 24 hr tablet Take 1 tablet (50 mg total) by mouth once daily. 14 tablet 0    oxyCODONE (ROXICODONE) 5 MG immediate release tablet TK 1 T PO Q 6 H PRN  0    oxyCODONE-acetaminophen (PERCOCET) 5-325 mg per tablet Take 1 tablet by mouth every 4 to 6 hours as needed for Pain (Pain related to surgery for cancer). 28 tablet 0    prochlorperazine (COMPAZINE) 10 MG tablet TAKE 1 TABLET BY MOUTH EVERY 6 HOURS AS NEEDED 337 tablet 1    rosuvastatin (CRESTOR) 20 MG tablet Take 1 tablet (20 mg total) by mouth once daily. (Patient taking differently: Take 20 mg by mouth every morning. ) 30 tablet 11    sertraline (ZOLOFT) 50 MG tablet TAKE 1 TABLET(50 MG) BY MOUTH EVERY DAY (Patient taking differently: Take 50 mg by mouth every evening. TAKE 1 TABLET(50 MG) BY MOUTH EVERY DAY) 90 tablet 1    tiZANidine (ZANAFLEX) 2 MG tablet Take 2 tablets (4 mg total) by mouth every 8 (eight) hours as needed. 30 tablet 0     Current Facility-Administered Medications   Medication Dose Route Frequency Provider Last Rate Last Dose    cyanocobalamin injection 100 mcg  100 mcg Intramuscular Q30 Days MENDEL Wolf MD   100 mcg at 05/08/17 1509       ROS  Review of Systems   Constitutional: Negative for chills, diaphoresis, fatigue, fever and unexpected weight change.   HENT: Negative for rhinorrhea, sinus pressure, sore throat and tinnitus.    Eyes: Negative for photophobia and visual disturbance.   Respiratory: Negative for cough, shortness of breath and wheezing.    Cardiovascular: Negative for chest pain and palpitations.   Gastrointestinal: Negative for abdominal pain, blood in stool, constipation, diarrhea, nausea and vomiting.   Genitourinary: Negative for dysuria, flank pain, frequency and vaginal discharge.   Musculoskeletal: Positive for arthralgias and back pain. Negative for joint swelling.   Skin: Negative for  rash.   Neurological: Negative for speech difficulty, weakness, light-headedness and headaches.   Psychiatric/Behavioral: Negative for behavioral problems and dysphoric mood.       Physical Exam  Vitals:    05/24/19 1211   BP: 128/68   BP Location: Right arm   Patient Position: Sitting   BP Method: Pediatric (Manual)   Pulse: 76   Resp: 16   Temp: 97.9 °F (36.6 °C)   TempSrc: Oral   SpO2: 99%   Weight: 38.5 kg (84 lb 12.3 oz)   Height: 5' (1.524 m)    Body mass index is 16.55 kg/m².  Weight: 38.5 kg (84 lb 12.3 oz)   Height: 5' (152.4 cm)     Physical Exam   Constitutional: She appears distressed.   She is shifting to find a comfortable position due to pain   HENT:   Head: Normocephalic and atraumatic.   Eyes: EOM are normal.   Neurological: She is alert.   Skin: Skin is warm and dry. No rash noted. No erythema.   Psychiatric: She has a normal mood and affect. Her behavior is normal.   Nursing note and vitals reviewed.      Health Maintenance       Date Due Completion Date    Shingles Vaccine (1 of 2) 01/30/1994 ---    Lipid Panel 07/26/2019 7/26/2018    Mammogram 04/06/2020 4/6/2018    Override on 3/28/2017: Declined    Override on 3/25/2014: Declined (not at this present time)    Override on 5/14/2012: Done    High Dose Statin 05/23/2020 5/23/2019    DEXA SCAN 07/26/2021 7/26/2018    Override on 3/25/2014: Declined (not at this present time)    Override on 6/12/2012: Done    Colonoscopy 10/30/2021 10/30/2018    Override on 1/1/2008: Done    Override on 5/14/2006: Done    TETANUS VACCINE 08/24/2026 8/24/2016 (Declined)    Override on 8/24/2016: Declined          Health maintenance reviewed and addressed as ordered      ASSESSMENT     1. Non-small cell cancer of right lung    2. Thoracolumbar back pain    3. Abnormal MRI, spine        PLAN:     Problem List Items Addressed This Visit        Oncology    Non-small cell lung cancer - Primary  -check renal function as she may need CT  -urgent onc referral to  re-establish as she may need PET CT    Relevant Orders    Ambulatory referral to Hematology / Oncology    CBC auto differential    Comprehensive metabolic panel       Orthopedic    Thoracolumbar back pain  -this may be cancer related pain  -until then she should contact Dr. Cazares regarding her fracture and current pain needs as her pharmacy will not fill the medication unless he prescribes it    Relevant Orders    Ambulatory referral to Hematology / Oncology      Other Visit Diagnoses     Abnormal MRI, spine      -compression fracture present, but also hypolucency concerning for cancer    . Mild acute/subacute L2 vertebral body superior endplate compression fracture.  Minimal retropulsion is seen into the anterior spinal canal at this level with no evidence of spinal canal stenosis.  2. Small indeterminate T1 hypointense focus within the L2 vertebral body.  Although this does not appear to be resulting in a pathologic fracture, potential metastatic lesion not excluded given patient's malignancy history.  Future post-contrast MR follow-up may be obtained versus evaluation on future PET/CT.  No hypermetabolic lesion was seen in this region on previous PET/CT from December 2018.  This report was flagged in Epic as abnormal.    Relevant Orders    Ambulatory referral to Hematology / Oncology        She has been scheduled to see oncology Monday w/ Dr. Gonzalo Morris MD  05/24/2019 12:32 PM        Follow up in about 6 weeks (around 7/5/2019) for Follow up.

## 2019-05-27 ENCOUNTER — INITIAL CONSULT (OUTPATIENT)
Dept: HEMATOLOGY/ONCOLOGY | Facility: CLINIC | Age: 74
End: 2019-05-27
Payer: MEDICARE

## 2019-05-27 ENCOUNTER — PATIENT MESSAGE (OUTPATIENT)
Dept: FAMILY MEDICINE | Facility: CLINIC | Age: 74
End: 2019-05-27

## 2019-05-27 ENCOUNTER — LAB VISIT (OUTPATIENT)
Dept: LAB | Facility: HOSPITAL | Age: 74
End: 2019-05-27
Attending: FAMILY MEDICINE
Payer: MEDICARE

## 2019-05-27 VITALS
TEMPERATURE: 98 F | HEIGHT: 63 IN | OXYGEN SATURATION: 99 % | BODY MASS INDEX: 15.47 KG/M2 | DIASTOLIC BLOOD PRESSURE: 65 MMHG | WEIGHT: 87.31 LBS | HEART RATE: 68 BPM | SYSTOLIC BLOOD PRESSURE: 148 MMHG

## 2019-05-27 DIAGNOSIS — E83.52 HYPERCALCEMIA: ICD-10-CM

## 2019-05-27 DIAGNOSIS — R93.7 ABNORMAL MRI, LUMBAR SPINE: ICD-10-CM

## 2019-05-27 DIAGNOSIS — M89.8X9 BONE PAIN: Primary | ICD-10-CM

## 2019-05-27 DIAGNOSIS — C34.11 MALIGNANT NEOPLASM OF UPPER LOBE, RIGHT BRONCHUS OR LUNG: ICD-10-CM

## 2019-05-27 DIAGNOSIS — C34.91 MALIGNANT NEOPLASM OF RIGHT LUNG, UNSPECIFIED PART OF LUNG: ICD-10-CM

## 2019-05-27 LAB
ANION GAP SERPL CALC-SCNC: 7 MMOL/L (ref 8–16)
BUN SERPL-MCNC: 31 MG/DL (ref 8–23)
CALCIUM SERPL-MCNC: 9.8 MG/DL (ref 8.7–10.5)
CHLORIDE SERPL-SCNC: 104 MMOL/L (ref 95–110)
CO2 SERPL-SCNC: 25 MMOL/L (ref 23–29)
CREAT SERPL-MCNC: 1.4 MG/DL (ref 0.5–1.4)
EST. GFR  (AFRICAN AMERICAN): 42 ML/MIN/1.73 M^2
EST. GFR  (NON AFRICAN AMERICAN): 37 ML/MIN/1.73 M^2
GLUCOSE SERPL-MCNC: 101 MG/DL (ref 70–110)
POTASSIUM SERPL-SCNC: 4.3 MMOL/L (ref 3.5–5.1)
SODIUM SERPL-SCNC: 136 MMOL/L (ref 136–145)

## 2019-05-27 PROCEDURE — 99999 PR PBB SHADOW E&M-EST. PATIENT-LVL III: ICD-10-PCS | Mod: PBBFAC,,, | Performed by: INTERNAL MEDICINE

## 2019-05-27 PROCEDURE — 80048 BASIC METABOLIC PNL TOTAL CA: CPT

## 2019-05-27 PROCEDURE — 3077F SYST BP >= 140 MM HG: CPT | Mod: CPTII,S$GLB,, | Performed by: INTERNAL MEDICINE

## 2019-05-27 PROCEDURE — 36415 COLL VENOUS BLD VENIPUNCTURE: CPT

## 2019-05-27 PROCEDURE — 3077F PR MOST RECENT SYSTOLIC BLOOD PRESSURE >= 140 MM HG: ICD-10-PCS | Mod: CPTII,S$GLB,, | Performed by: INTERNAL MEDICINE

## 2019-05-27 PROCEDURE — 99215 PR OFFICE/OUTPT VISIT, EST, LEVL V, 40-54 MIN: ICD-10-PCS | Mod: S$GLB,,, | Performed by: INTERNAL MEDICINE

## 2019-05-27 PROCEDURE — 1101F PT FALLS ASSESS-DOCD LE1/YR: CPT | Mod: CPTII,S$GLB,, | Performed by: INTERNAL MEDICINE

## 2019-05-27 PROCEDURE — 3078F PR MOST RECENT DIASTOLIC BLOOD PRESSURE < 80 MM HG: ICD-10-PCS | Mod: CPTII,S$GLB,, | Performed by: INTERNAL MEDICINE

## 2019-05-27 PROCEDURE — 1101F PR PT FALLS ASSESS DOC 0-1 FALLS W/OUT INJ PAST YR: ICD-10-PCS | Mod: CPTII,S$GLB,, | Performed by: INTERNAL MEDICINE

## 2019-05-27 PROCEDURE — 99215 OFFICE O/P EST HI 40 MIN: CPT | Mod: S$GLB,,, | Performed by: INTERNAL MEDICINE

## 2019-05-27 PROCEDURE — 3078F DIAST BP <80 MM HG: CPT | Mod: CPTII,S$GLB,, | Performed by: INTERNAL MEDICINE

## 2019-05-27 PROCEDURE — 99999 PR PBB SHADOW E&M-EST. PATIENT-LVL III: CPT | Mod: PBBFAC,,, | Performed by: INTERNAL MEDICINE

## 2019-05-27 NOTE — PROGRESS NOTES
Chief Complaint :  Lung cancer    Hx of Present illness :  Patient is a 74 y.o. year old female who presents to the clinic today for  Oncology followup.. Initially had sx of bronchitis.  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 Patient has undergone Wedge resection.  Lymph nodes were negative for mets. Was having sx of back pain.  MRI       Allergies :    Review of patient's allergies indicates:   Allergen Reactions    Lunesta [eszopiclone] Other (See Comments)     Sleep walking       Occupation :  Cleaning Houses    Transfusion :  None    Menstrual & obstetric Hx :   3; para 3.  Age of menarche: 15  Age of first pregnancy: 20  Lactation history: No  Age of menopause:  Hysterectomy at age 31  HRT:  Short time    Present Meds :   Medication List with Changes/Refills   Current Medications    ASPIRIN (ECOTRIN) 81 MG EC TABLET    Take 1 tablet (81 mg total) by mouth once daily.    GABAPENTIN (NEURONTIN) 300 MG CAPSULE    Take 1 capsule (300 mg total) by mouth 3 (three) times daily. Start by taking one capsule at night and slowly increasing to 3 times daily.    LOSARTAN-HYDROCHLOROTHIAZIDE 100-25 MG (HYZAAR) 100-25 MG PER TABLET    Take 1 tablet by mouth once daily.    MELOXICAM (MOBIC) 7.5 MG TABLET    Take 1 tablet (7.5 mg total) by mouth once daily.    METHYLPREDNISOLONE (MEDROL DOSEPACK) 4 MG TABLET    use as directed    METOPROLOL SUCCINATE (TOPROL-XL) 50 MG 24 HR TABLET    Take 1 tablet (50 mg total) by mouth once daily.    OXYCODONE (ROXICODONE) 5 MG IMMEDIATE RELEASE TABLET    TK 1 T PO Q 6 H PRN    OXYCODONE-ACETAMINOPHEN (PERCOCET) 5-325 MG PER TABLET    Take 1 tablet by mouth every 4 to 6 hours as needed for Pain (Pain related to surgery for cancer).    PROCHLORPERAZINE (COMPAZINE) 10 MG TABLET    TAKE 1 TABLET BY MOUTH EVERY 6 HOURS AS NEEDED    ROSUVASTATIN (CRESTOR) 20 MG TABLET    Take 1 tablet (20 mg total) by mouth once  daily.    SERTRALINE (ZOLOFT) 50 MG TABLET    TAKE 1 TABLET(50 MG) BY MOUTH EVERY DAY    TIZANIDINE (ZANAFLEX) 2 MG TABLET    Take 2 tablets (4 mg total) by mouth every 8 (eight) hours as needed.       Past Medical Hx :  No hx of DVT or PE..  No hx of DM, PUD, Hepatitis, Yhyroid dysfunction, CVA , seizure disorder.    Past Medical Hx :  Past Medical History:   Diagnosis Date    Arthritis     neck and back    COPD (chronic obstructive pulmonary disease)     Malignant hypertension     Non-small cell lung cancer 12/26/2018    Osteoporosis, postmenopausal     Tobacco use disorder        Travel Hx :  N/A    Immunization :  Immunization History   Administered Date(s) Administered    Pneumococcal Conjugate - 13 Valent 11/14/2016    Pneumococcal Polysaccharide - 23 Valent 10/14/2013       Family Hx :  Family History   Problem Relation Age of Onset    Heart disease Mother     Diabetes Mother     Stroke Father     Heart disease Sister     Anesthesia problems Neg Hx        Social Hx :  Social History     Socioeconomic History    Marital status:      Spouse name: Not on file    Number of children: Not on file    Years of education: Not on file    Highest education level: Not on file   Occupational History    Occupation: Retired   Social Needs    Financial resource strain: Not on file    Food insecurity:     Worry: Not on file     Inability: Not on file    Transportation needs:     Medical: Not on file     Non-medical: Not on file   Tobacco Use    Smoking status: Former Smoker     Packs/day: 0.15     Years: 60.00     Pack years: 9.00     Types: Cigarettes    Smokeless tobacco: Never Used    Tobacco comment: quit x 2-3 wks. ago   Substance and Sexual Activity    Alcohol use: Yes     Comment: socially    Drug use: No    Sexual activity: Never     Partners: Male     Comment:    Lifestyle    Physical activity:     Days per week: Not on file     Minutes per session: Not on file    Stress: Not  on file   Relationships    Social connections:     Talks on phone: Not on file     Gets together: Not on file     Attends Adventism service: Not on file     Active member of club or organization: Not on file     Attends meetings of clubs or organizations: Not on file     Relationship status: Not on file   Other Topics Concern    Not on file   Social History Narrative    Not on file       Surgery : C.Section x 3; Hysterectomy ; Bilateral cataract surgery.  Colonoscopy 2018.     Symptoms :    Review of Systems   Constitutional: Positive for malaise/fatigue and weight loss (fluctuates.). Negative for chills, diaphoresis and fever.   HENT: Negative for congestion, hearing loss, nosebleeds, sore throat and tinnitus.    Eyes: Negative for blurred vision, double vision and photophobia.   Respiratory: Positive for cough. Negative for shortness of breath.    Cardiovascular: Negative for chest pain, palpitations, orthopnea, claudication and leg swelling.   Gastrointestinal: Negative for abdominal pain, blood in stool, constipation, diarrhea, heartburn, nausea and vomiting.   Genitourinary: Negative for dysuria, flank pain, frequency, hematuria and urgency.   Musculoskeletal: Positive for back pain. Negative for falls, joint pain, myalgias and neck pain.   Skin: Negative for itching and rash.   Neurological: Positive for sensory change. Negative for dizziness, tingling, tremors and headaches.   Endo/Heme/Allergies: Negative for environmental allergies. Does not bruise/bleed easily.   Psychiatric/Behavioral: Negative for depression. The patient is not nervous/anxious and does not have insomnia.        Physical Exam :  Daughter, and daughter in law present.   Physical Exam   Constitutional: She is oriented to person, place, and time and well-developed, well-nourished, and in no distress. Vital signs are normal. No distress.   HENT:   Head: Normocephalic and atraumatic.   Right Ear: External ear normal.   Left Ear: External  ear normal.   Nose: Nose normal.   Mouth/Throat: Oropharynx is clear and moist. No oropharyngeal exudate.   Eyes: Conjunctivae, EOM and lids are normal. Lids are everted and swept, no foreign bodies found. Right eye exhibits no discharge. Left eye exhibits no discharge. No scleral icterus.   Neck: Trachea normal, normal range of motion, full passive range of motion without pain and phonation normal. Neck supple. Normal carotid pulses, no hepatojugular reflux and no JVD present. No tracheal tenderness present. Carotid bruit is not present. No tracheal deviation present. No thyroid mass and no thyromegaly present.   Cardiovascular: Normal rate, regular rhythm, normal heart sounds, intact distal pulses and normal pulses. PMI is not displaced. Exam reveals no gallop and no friction rub.   No murmur heard.  Pulmonary/Chest: Effort normal and breath sounds normal. No stridor. No apnea. No respiratory distress. She has no wheezes. She has no rales. She exhibits no tenderness.   Abdominal: Soft. Normal appearance. She exhibits no distension, no ascites and no mass. There is no tenderness. There is no rebound, no guarding and no CVA tenderness. No hernia.   Musculoskeletal: Normal range of motion. She exhibits no edema, tenderness or deformity.   Lymphadenopathy:        Head (right side): No submental, no submandibular, no tonsillar, no preauricular, no posterior auricular and no occipital adenopathy present.        Head (left side): No submental, no submandibular, no tonsillar, no preauricular, no posterior auricular and no occipital adenopathy present.     She has no cervical adenopathy.     She has no axillary adenopathy.        Right: No inguinal, no supraclavicular and no epitrochlear adenopathy present.        Left: No inguinal, no supraclavicular and no epitrochlear adenopathy present.   Neurological: She is alert and oriented to person, place, and time. She has normal sensation, normal strength, normal reflexes and  intact cranial nerves. She displays normal reflexes. No cranial nerve deficit. She exhibits normal muscle tone. Gait normal. Coordination normal. GCS score is 15.   Skin: Skin is warm, dry and intact. No rash noted. She is not diaphoretic. No cyanosis or erythema. No pallor. Nails show no clubbing.   Psychiatric: Mood, memory, affect and judgment normal.   Nursing note and vitals reviewed.        Labs & Imaging :  MRI Lumbar spine : mild acute Subacute Fx L2.  Small hypodense focus L2. 05/24/19 :   05/24/19 : K 5.5; NFBS 99; Ca 11.0; bili 0.4.  .  Hgb 12.9; hct 40.1; MCV 90; Platelets 374,000 ANC 8,000  Patient had squamous Cell carcinoma in situ with clear margins    Dx :  Squamous Cell carcinoma in situ of right upper lpbe.  PTis,pN0.   Mildly elevated Serum calcium.     Assessment & Plan:  Reviewed with patient and family members.  Unusual to get Bone mets from in situ cancer.  Not on calcium supplements. Drink plenty of fluids. Will get  PET/CT and re evaluate.  Serum calcium today

## 2019-05-27 NOTE — LETTER
May 27, 2019      Kirti Morris MD  [address]  [place] [address]           [facility]  [address] [facility] [address]  [place] [address]  Phone: [phone]          Patient: Ariadna Villegas   MR Number: [id number]   YOB: 1945   Date of Visit: 5/27/2019       Dear Dr. Kirti Morris:    Thank you for referring Ariadna Villegas to me for evaluation. Attached you will find relevant portions of my assessment and plan of care.    If you have questions, please do not hesitate to call me. I look forward to following Ariadna Villegas along with you.    Sincerely,    Raz Song MD    Enclosure  CC:  No Recipients    If you would like to receive this communication electronically, please contact [email or web address] or [phone] to request more information on [employer] access.    For providers and/or their staff who would like to refer a patient to [facility], please contact us through our one-stop-shop provider referral line, [facility], at [phone].    If you feel you have received this communication in error or would no longer like to receive these types of communications, please e-mail [email or web address]

## 2019-06-01 ENCOUNTER — HOSPITAL ENCOUNTER (OUTPATIENT)
Dept: RADIOLOGY | Facility: HOSPITAL | Age: 74
Discharge: HOME | End: 2019-06-01
Attending: INTERNAL MEDICINE
Payer: MEDICARE

## 2019-06-01 DIAGNOSIS — C34.91 MALIGNANT NEOPLASM OF RIGHT LUNG, UNSPECIFIED PART OF LUNG: ICD-10-CM

## 2019-06-01 DIAGNOSIS — M89.8X9 BONE PAIN: ICD-10-CM

## 2019-06-01 DIAGNOSIS — R93.7 ABNORMAL MRI, LUMBAR SPINE: ICD-10-CM

## 2019-06-01 DIAGNOSIS — E83.52 HYPERCALCEMIA: ICD-10-CM

## 2019-06-01 DIAGNOSIS — C34.11 MALIGNANT NEOPLASM OF UPPER LOBE, RIGHT BRONCHUS OR LUNG: ICD-10-CM

## 2019-06-01 PROCEDURE — A9552 F18 FDG: HCPCS

## 2019-06-01 PROCEDURE — 78815 PET IMAGE W/CT SKULL-THIGH: CPT | Mod: TC,PS

## 2019-06-01 PROCEDURE — 78815 PET IMAGE W/CT SKULL-THIGH: CPT | Mod: 26,PS,, | Performed by: RADIOLOGY

## 2019-06-01 PROCEDURE — 78815 NM PET CT ROUTINE: ICD-10-PCS | Mod: 26,PS,, | Performed by: RADIOLOGY

## 2019-06-03 LAB — POCT GLUCOSE: 70 MG/DL (ref 70–110)

## 2019-06-03 NOTE — PROGRESS NOTES
Chief Complaint :  Lung cancer    Hx of Present illness :  Patient is a 74 y.o. year old female who presents to the clinic today for  Oncology followup.. Initially had sx of bronchitis.  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 Patient has undergone Wedge resection.  Lymph nodes were negative for mets. Was having sx of back pain.  MRI   Spine showed small indeterminate focus of uptake in L2.     Allergies :    Review of patient's allergies indicates:   Allergen Reactions    Lunesta [eszopiclone] Other (See Comments)     Sleep walking       Occupation :  Cleaning Houses    Transfusion :  None    Menstrual & obstetric Hx :   3; para 3.  Age of menarche: 15  Age of first pregnancy: 20  Lactation history: No  Age of menopause:  Hysterectomy at age 31  HRT:  Short time    Present Meds :   Medication List with Changes/Refills   Current Medications    ASPIRIN (ECOTRIN) 81 MG EC TABLET    Take 1 tablet (81 mg total) by mouth once daily.    GABAPENTIN (NEURONTIN) 300 MG CAPSULE    Take 1 capsule (300 mg total) by mouth 3 (three) times daily. Start by taking one capsule at night and slowly increasing to 3 times daily.    LOSARTAN-HYDROCHLOROTHIAZIDE 100-25 MG (HYZAAR) 100-25 MG PER TABLET    Take 1 tablet by mouth once daily.    MELOXICAM (MOBIC) 7.5 MG TABLET    Take 1 tablet (7.5 mg total) by mouth once daily.    METOPROLOL SUCCINATE (TOPROL-XL) 50 MG 24 HR TABLET    Take 1 tablet (50 mg total) by mouth once daily.    OXYCODONE (ROXICODONE) 5 MG IMMEDIATE RELEASE TABLET    TK 1 T PO Q 6 H PRN    ROSUVASTATIN (CRESTOR) 20 MG TABLET    Take 1 tablet (20 mg total) by mouth once daily.       Past Medical Hx :  No hx of DVT or PE..  No hx of DM, PUD, Hepatitis, Yhyroid dysfunction, CVA , seizure disorder.    Past Medical Hx :  Past Medical History:   Diagnosis Date    Arthritis     neck and back    COPD (chronic obstructive pulmonary disease)      Malignant hypertension     Non-small cell lung cancer 12/26/2018    Osteoporosis, postmenopausal     Tobacco use disorder        Travel Hx :  N/A    Immunization :  Immunization History   Administered Date(s) Administered    Pneumococcal Conjugate - 13 Valent 11/14/2016    Pneumococcal Polysaccharide - 23 Valent 10/14/2013       Family Hx :  Family History   Problem Relation Age of Onset    Heart disease Mother     Diabetes Mother     Stroke Father     Heart disease Sister     Anesthesia problems Neg Hx        Social Hx :  Social History     Socioeconomic History    Marital status:      Spouse name: Not on file    Number of children: Not on file    Years of education: Not on file    Highest education level: Not on file   Occupational History    Occupation: Retired   Social Needs    Financial resource strain: Not on file    Food insecurity:     Worry: Not on file     Inability: Not on file    Transportation needs:     Medical: Not on file     Non-medical: Not on file   Tobacco Use    Smoking status: Former Smoker     Packs/day: 0.15     Years: 60.00     Pack years: 9.00     Types: Cigarettes    Smokeless tobacco: Never Used    Tobacco comment: quit x 2-3 wks. ago   Substance and Sexual Activity    Alcohol use: Yes     Comment: socially    Drug use: No    Sexual activity: Never     Partners: Male     Comment:    Lifestyle    Physical activity:     Days per week: Not on file     Minutes per session: Not on file    Stress: Not on file   Relationships    Social connections:     Talks on phone: Not on file     Gets together: Not on file     Attends Hinduism service: Not on file     Active member of club or organization: Not on file     Attends meetings of clubs or organizations: Not on file     Relationship status: Not on file   Other Topics Concern    Not on file   Social History Narrative    Not on file       Surgery : C.Section x 3; Hysterectomy ; Bilateral cataract surgery.   Colonoscopy 2018.     Symptoms :    Review of Systems   Constitutional: Positive for malaise/fatigue and weight loss (fluctuates.). Negative for chills, diaphoresis and fever.   HENT: Negative for congestion, hearing loss, nosebleeds, sore throat and tinnitus.    Eyes: Negative for blurred vision, double vision and photophobia.   Respiratory: Positive for cough. Negative for shortness of breath.    Cardiovascular: Negative for chest pain, palpitations, orthopnea, claudication and leg swelling.   Gastrointestinal: Negative for abdominal pain, blood in stool, constipation, diarrhea, heartburn, nausea and vomiting.   Genitourinary: Negative for dysuria, flank pain, frequency, hematuria and urgency.   Musculoskeletal: Positive for back pain. Negative for falls, joint pain, myalgias and neck pain.   Skin: Negative for itching and rash.   Neurological: Positive for sensory change. Negative for dizziness, tingling, tremors and headaches.   Endo/Heme/Allergies: Negative for environmental allergies. Does not bruise/bleed easily.   Psychiatric/Behavioral: Negative for depression. The patient is not nervous/anxious and does not have insomnia.        Physical Exam :  Daughter, and daughter in law present.   Physical Exam   Constitutional: She is oriented to person, place, and time and well-developed, well-nourished, and in no distress. Vital signs are normal. No distress.   HENT:   Head: Normocephalic and atraumatic.   Right Ear: External ear normal.   Left Ear: External ear normal.   Nose: Nose normal.   Mouth/Throat: Oropharynx is clear and moist. No oropharyngeal exudate.   Eyes: Conjunctivae, EOM and lids are normal. Lids are everted and swept, no foreign bodies found. Right eye exhibits no discharge. Left eye exhibits no discharge. No scleral icterus.   Neck: Trachea normal, normal range of motion, full passive range of motion without pain and phonation normal. Neck supple. Normal carotid pulses, no hepatojugular reflux  and no JVD present. No tracheal tenderness present. Carotid bruit is not present. No tracheal deviation present. No thyroid mass and no thyromegaly present.   Cardiovascular: Normal rate, regular rhythm, normal heart sounds, intact distal pulses and normal pulses. PMI is not displaced. Exam reveals no gallop and no friction rub.   No murmur heard.  Pulmonary/Chest: Effort normal and breath sounds normal. No stridor. No apnea. No respiratory distress. She has no wheezes. She has no rales. She exhibits no tenderness.   Abdominal: Soft. Normal appearance. She exhibits no distension, no ascites and no mass. There is no tenderness. There is no rebound, no guarding and no CVA tenderness. No hernia.   Musculoskeletal: Normal range of motion. She exhibits no edema, tenderness or deformity.   Lymphadenopathy:        Head (right side): No submental, no submandibular, no tonsillar, no preauricular, no posterior auricular and no occipital adenopathy present.        Head (left side): No submental, no submandibular, no tonsillar, no preauricular, no posterior auricular and no occipital adenopathy present.     She has no cervical adenopathy.     She has no axillary adenopathy.        Right: No inguinal, no supraclavicular and no epitrochlear adenopathy present.        Left: No inguinal, no supraclavicular and no epitrochlear adenopathy present.   Neurological: She is alert and oriented to person, place, and time. She has normal sensation, normal strength, normal reflexes and intact cranial nerves. No cranial nerve deficit. She exhibits normal muscle tone. Gait normal. Coordination normal. GCS score is 15.   Skin: Skin is warm, dry and intact. No rash noted. She is not diaphoretic. No cyanosis or erythema. No pallor. Nails show no clubbing.   Psychiatric: Mood, memory, affect and judgment normal.   Nursing note and vitals reviewed.        Labs & Imaging :  05/27/19  : serum calcium ; 9.8; eGFR 37. 06/01/19 :  PET/CT ; no  hypermetabolic activity to suggest progression or mets. Uptake l2; radiologist feels it is probably benign.  X ray Lumbar spine on 5/17/19 : Osteopenia. No lytc or blastic activity.    Patient had squamous Cell carcinoma in situ with clear margins    Dx :  Squamous Cell carcinoma in situ of right upper lobe.  PTis,pN0.       Assessment & Plan:  Reviewed with patient and family members.    No evidence of mets. Patient has osteopenia. Get Dental evaluation and schedulia prolia injection q 6 months.  Consent obtained.

## 2019-06-04 ENCOUNTER — OFFICE VISIT (OUTPATIENT)
Dept: HEMATOLOGY/ONCOLOGY | Facility: CLINIC | Age: 74
End: 2019-06-04
Payer: MEDICARE

## 2019-06-04 VITALS
HEIGHT: 60 IN | TEMPERATURE: 99 F | WEIGHT: 93.94 LBS | HEART RATE: 70 BPM | BODY MASS INDEX: 18.44 KG/M2 | SYSTOLIC BLOOD PRESSURE: 119 MMHG | OXYGEN SATURATION: 96 % | DIASTOLIC BLOOD PRESSURE: 56 MMHG

## 2019-06-04 DIAGNOSIS — M85.88 OSTEOPENIA OF LUMBAR SPINE: ICD-10-CM

## 2019-06-04 DIAGNOSIS — M89.8X9 BONE PAIN: Primary | ICD-10-CM

## 2019-06-04 DIAGNOSIS — R93.7 ABNORMAL MRI, LUMBAR SPINE: ICD-10-CM

## 2019-06-04 DIAGNOSIS — C34.91 MALIGNANT NEOPLASM OF RIGHT LUNG, UNSPECIFIED PART OF LUNG: ICD-10-CM

## 2019-06-04 PROCEDURE — 3078F PR MOST RECENT DIASTOLIC BLOOD PRESSURE < 80 MM HG: ICD-10-PCS | Mod: CPTII,S$GLB,, | Performed by: INTERNAL MEDICINE

## 2019-06-04 PROCEDURE — 99999 PR PBB SHADOW E&M-EST. PATIENT-LVL III: ICD-10-PCS | Mod: PBBFAC,,, | Performed by: INTERNAL MEDICINE

## 2019-06-04 PROCEDURE — 99213 PR OFFICE/OUTPT VISIT, EST, LEVL III, 20-29 MIN: ICD-10-PCS | Mod: S$GLB,,, | Performed by: INTERNAL MEDICINE

## 2019-06-04 PROCEDURE — 3074F SYST BP LT 130 MM HG: CPT | Mod: CPTII,S$GLB,, | Performed by: INTERNAL MEDICINE

## 2019-06-04 PROCEDURE — 99213 OFFICE O/P EST LOW 20 MIN: CPT | Mod: S$GLB,,, | Performed by: INTERNAL MEDICINE

## 2019-06-04 PROCEDURE — 3074F PR MOST RECENT SYSTOLIC BLOOD PRESSURE < 130 MM HG: ICD-10-PCS | Mod: CPTII,S$GLB,, | Performed by: INTERNAL MEDICINE

## 2019-06-04 PROCEDURE — 1101F PR PT FALLS ASSESS DOC 0-1 FALLS W/OUT INJ PAST YR: ICD-10-PCS | Mod: CPTII,S$GLB,, | Performed by: INTERNAL MEDICINE

## 2019-06-04 PROCEDURE — 99999 PR PBB SHADOW E&M-EST. PATIENT-LVL III: CPT | Mod: PBBFAC,,, | Performed by: INTERNAL MEDICINE

## 2019-06-04 PROCEDURE — 1101F PT FALLS ASSESS-DOCD LE1/YR: CPT | Mod: CPTII,S$GLB,, | Performed by: INTERNAL MEDICINE

## 2019-06-04 PROCEDURE — 3078F DIAST BP <80 MM HG: CPT | Mod: CPTII,S$GLB,, | Performed by: INTERNAL MEDICINE

## 2019-06-04 NOTE — Clinical Note
Dental referral before starting prolia. Placed Orders for Prolia therapy. CBC,CMP before next visit.

## 2019-06-05 ENCOUNTER — PATIENT MESSAGE (OUTPATIENT)
Dept: FAMILY MEDICINE | Facility: CLINIC | Age: 74
End: 2019-06-05

## 2019-06-06 ENCOUNTER — PATIENT MESSAGE (OUTPATIENT)
Dept: FAMILY MEDICINE | Facility: CLINIC | Age: 74
End: 2019-06-06

## 2019-06-06 DIAGNOSIS — S22.000A COMPRESSION FRACTURE OF BODY OF THORACIC VERTEBRA: Primary | ICD-10-CM

## 2019-06-12 ENCOUNTER — PATIENT MESSAGE (OUTPATIENT)
Dept: FAMILY MEDICINE | Facility: CLINIC | Age: 74
End: 2019-06-12

## 2019-06-12 NOTE — TELEPHONE ENCOUNTER
Gaby @ Dr. Oneal's office states that she received the referral but she needs the patient's contact number.  Contact numbers provided.

## 2019-06-20 ENCOUNTER — PATIENT OUTREACH (OUTPATIENT)
Dept: ADMINISTRATIVE | Facility: HOSPITAL | Age: 74
End: 2019-06-20

## 2019-06-21 ENCOUNTER — INFUSION (OUTPATIENT)
Dept: INFUSION THERAPY | Facility: HOSPITAL | Age: 74
End: 2019-06-21
Attending: INTERNAL MEDICINE
Payer: MEDICARE

## 2019-06-21 VITALS
TEMPERATURE: 98 F | DIASTOLIC BLOOD PRESSURE: 64 MMHG | HEART RATE: 63 BPM | OXYGEN SATURATION: 99 % | SYSTOLIC BLOOD PRESSURE: 151 MMHG | RESPIRATION RATE: 18 BRPM

## 2019-06-21 DIAGNOSIS — M85.88 OSTEOPENIA OF LUMBAR SPINE: Primary | ICD-10-CM

## 2019-06-21 PROCEDURE — 63600175 PHARM REV CODE 636 W HCPCS: Mod: JG | Performed by: INTERNAL MEDICINE

## 2019-06-21 PROCEDURE — 96372 THER/PROPH/DIAG INJ SC/IM: CPT

## 2019-06-21 RX ADMIN — DENOSUMAB 60 MG: 60 INJECTION SUBCUTANEOUS at 12:06

## 2019-06-21 NOTE — PLAN OF CARE
Problem: Adult Inpatient Plan of Care  Goal: Plan of Care Review  Outcome: Ongoing (interventions implemented as appropriate)  Indications and possible side effects of Prolia reviewed with patient. Verbalized understanding. Patient states she is taking Vitamin D as directed for bone health. Denies any recent jaw pain or extensive dental procedures within the last 3 months. Received Prolia. Tolerated well. VSS. Monitored patient 10 minutes after injection. No reactions noted during visit. Received discharge instructions and follow up appointments. Verbalized understanding and ambulated unassisted off unit.

## 2019-06-24 ENCOUNTER — PATIENT MESSAGE (OUTPATIENT)
Dept: CARDIOTHORACIC SURGERY | Facility: CLINIC | Age: 74
End: 2019-06-24

## 2019-07-25 DIAGNOSIS — I10 ESSENTIAL HYPERTENSION: Chronic | ICD-10-CM

## 2019-07-26 DIAGNOSIS — I10 ESSENTIAL HYPERTENSION: Chronic | ICD-10-CM

## 2019-07-26 RX ORDER — LOSARTAN POTASSIUM AND HYDROCHLOROTHIAZIDE 25; 100 MG/1; MG/1
1 TABLET ORAL DAILY
Qty: 14 TABLET | Refills: 1 | Status: SHIPPED | OUTPATIENT
Start: 2019-07-26 | End: 2019-07-26 | Stop reason: SDUPTHER

## 2019-07-26 RX ORDER — METOPROLOL SUCCINATE 50 MG/1
50 TABLET, EXTENDED RELEASE ORAL DAILY
Qty: 14 TABLET | Refills: 1 | Status: SHIPPED | OUTPATIENT
Start: 2019-07-26 | End: 2019-07-26 | Stop reason: SDUPTHER

## 2019-07-26 RX ORDER — LOSARTAN POTASSIUM AND HYDROCHLOROTHIAZIDE 25; 100 MG/1; MG/1
1 TABLET ORAL DAILY
Qty: 90 TABLET | Refills: 1 | Status: SHIPPED | OUTPATIENT
Start: 2019-07-26 | End: 2019-07-26

## 2019-07-26 RX ORDER — METOPROLOL SUCCINATE 50 MG/1
50 TABLET, EXTENDED RELEASE ORAL DAILY
Qty: 90 TABLET | Refills: 1 | Status: SHIPPED | OUTPATIENT
Start: 2019-07-26 | End: 2019-07-26

## 2019-07-26 RX ORDER — LOSARTAN POTASSIUM AND HYDROCHLOROTHIAZIDE 25; 100 MG/1; MG/1
TABLET ORAL
Qty: 90 TABLET | Refills: 1 | Status: SHIPPED | OUTPATIENT
Start: 2019-07-26 | End: 2019-09-16 | Stop reason: SDUPTHER

## 2019-07-26 RX ORDER — METOPROLOL SUCCINATE 50 MG/1
TABLET, EXTENDED RELEASE ORAL
Qty: 90 TABLET | Refills: 1 | Status: SHIPPED | OUTPATIENT
Start: 2019-07-26 | End: 2019-08-15

## 2019-07-29 ENCOUNTER — HOSPITAL ENCOUNTER (OUTPATIENT)
Dept: RADIOLOGY | Facility: HOSPITAL | Age: 74
Discharge: HOME | End: 2019-07-29
Attending: THORACIC SURGERY (CARDIOTHORACIC VASCULAR SURGERY)
Payer: MEDICARE

## 2019-07-29 ENCOUNTER — OFFICE VISIT (OUTPATIENT)
Dept: CARDIOTHORACIC SURGERY | Facility: CLINIC | Age: 74
End: 2019-07-29
Attending: THORACIC SURGERY (CARDIOTHORACIC VASCULAR SURGERY)
Payer: MEDICARE

## 2019-07-29 VITALS
BODY MASS INDEX: 17.96 KG/M2 | DIASTOLIC BLOOD PRESSURE: 48 MMHG | HEIGHT: 60 IN | OXYGEN SATURATION: 94 % | WEIGHT: 91.5 LBS | HEART RATE: 57 BPM | SYSTOLIC BLOOD PRESSURE: 97 MMHG

## 2019-07-29 DIAGNOSIS — D09.9 SQUAMOUS CELL CARCINOMA IN SITU: ICD-10-CM

## 2019-07-29 DIAGNOSIS — C34.91 SQUAMOUS CELL CARCINOMA OF RIGHT LUNG: Primary | ICD-10-CM

## 2019-07-29 PROCEDURE — 99499 RISK ADDL DX/OHS AUDIT: ICD-10-PCS | Mod: S$GLB,,, | Performed by: THORACIC SURGERY (CARDIOTHORACIC VASCULAR SURGERY)

## 2019-07-29 PROCEDURE — 99499 UNLISTED E&M SERVICE: CPT | Mod: S$GLB,,, | Performed by: THORACIC SURGERY (CARDIOTHORACIC VASCULAR SURGERY)

## 2019-07-29 PROCEDURE — 71250 CT THORAX DX C-: CPT | Mod: 26,,, | Performed by: RADIOLOGY

## 2019-07-29 PROCEDURE — 1101F PT FALLS ASSESS-DOCD LE1/YR: CPT | Mod: CPTII,S$GLB,, | Performed by: THORACIC SURGERY (CARDIOTHORACIC VASCULAR SURGERY)

## 2019-07-29 PROCEDURE — 99999 PR PBB SHADOW E&M-EST. PATIENT-LVL III: ICD-10-PCS | Mod: PBBFAC,,, | Performed by: THORACIC SURGERY (CARDIOTHORACIC VASCULAR SURGERY)

## 2019-07-29 PROCEDURE — 3074F SYST BP LT 130 MM HG: CPT | Mod: CPTII,S$GLB,, | Performed by: THORACIC SURGERY (CARDIOTHORACIC VASCULAR SURGERY)

## 2019-07-29 PROCEDURE — 3074F PR MOST RECENT SYSTOLIC BLOOD PRESSURE < 130 MM HG: ICD-10-PCS | Mod: CPTII,S$GLB,, | Performed by: THORACIC SURGERY (CARDIOTHORACIC VASCULAR SURGERY)

## 2019-07-29 PROCEDURE — 99999 PR PBB SHADOW E&M-EST. PATIENT-LVL III: CPT | Mod: PBBFAC,,, | Performed by: THORACIC SURGERY (CARDIOTHORACIC VASCULAR SURGERY)

## 2019-07-29 PROCEDURE — 99213 PR OFFICE/OUTPT VISIT, EST, LEVL III, 20-29 MIN: ICD-10-PCS | Mod: S$GLB,,, | Performed by: THORACIC SURGERY (CARDIOTHORACIC VASCULAR SURGERY)

## 2019-07-29 PROCEDURE — 3078F PR MOST RECENT DIASTOLIC BLOOD PRESSURE < 80 MM HG: ICD-10-PCS | Mod: CPTII,S$GLB,, | Performed by: THORACIC SURGERY (CARDIOTHORACIC VASCULAR SURGERY)

## 2019-07-29 PROCEDURE — 1101F PR PT FALLS ASSESS DOC 0-1 FALLS W/OUT INJ PAST YR: ICD-10-PCS | Mod: CPTII,S$GLB,, | Performed by: THORACIC SURGERY (CARDIOTHORACIC VASCULAR SURGERY)

## 2019-07-29 PROCEDURE — 3078F DIAST BP <80 MM HG: CPT | Mod: CPTII,S$GLB,, | Performed by: THORACIC SURGERY (CARDIOTHORACIC VASCULAR SURGERY)

## 2019-07-29 PROCEDURE — 71250 CT CHEST WITHOUT CONTRAST: ICD-10-PCS | Mod: 26,,, | Performed by: RADIOLOGY

## 2019-07-29 PROCEDURE — 71250 CT THORAX DX C-: CPT | Mod: TC

## 2019-07-29 PROCEDURE — 99213 OFFICE O/P EST LOW 20 MIN: CPT | Mod: S$GLB,,, | Performed by: THORACIC SURGERY (CARDIOTHORACIC VASCULAR SURGERY)

## 2019-07-29 NOTE — PROGRESS NOTES
Subjective:       Patient ID: Ariadna Villegas is a 74 y.o. female.    Chief Complaint: Follow-up    Diagnosis:  Squamous Cell Carcinoma in situ    Pre-operative therapy: none    Procedure(s) and date(s): 1/16/19: right VATS upper lobe wedge resection, MLND    Pathology: 1.2 cm squamous cell carcinoma in situ, Levels 2R,4R, 7 = negative, no VPI, no LVI, margins 0.2cm parenchymal margin, pTisN0     Post-operative therapy: Surveillance     HPI    74 y.o. female presents for 6 month follow up s/p right VATS upper lobe wedge resection of in situ squamous cell carcinoma. In May of this year, she presented to home oncologist with complaints of back pain. Xray showed L2 abnormality. Subsequent MRI results indeterminate. PET CT also ordered which showed increased uptake associated with new L2 compression fracture.  However, given the absence of other findings of metastatic disease in the soft tissues or bones, benign etiology is favored.  Denies fever, chills, SOB, nausea, vomiting, changes in bowel or bladder function.     Review of Systems   Constitutional: Negative for chills and fever.   HENT: Negative for congestion, trouble swallowing and voice change.    Eyes: Negative for pain.   Respiratory: Negative for cough, chest tightness, shortness of breath and wheezing.    Cardiovascular: Negative for chest pain and palpitations.   Gastrointestinal: Negative for abdominal pain, nausea and vomiting.   Genitourinary: Negative for difficulty urinating.   Skin: Negative for color change and rash.   Neurological: Negative for syncope and headaches.   Hematological: Negative for adenopathy.   Psychiatric/Behavioral: Negative for confusion.         Objective:       Vitals:    07/29/19 1331   BP: (!) 97/48   Pulse: (!) 57   SpO2: (!) 94%   Weight: 41.5 kg (91 lb 7.9 oz)   Height: 5' (1.524 m)   PainSc: 0-No pain       Physical Exam   Constitutional: She is oriented to person, place, and time. She appears well-developed and  well-nourished.   HENT:   Head: Normocephalic and atraumatic.   Eyes: EOM are normal.   Neck: Normal range of motion. Neck supple. No tracheal deviation present.   Cardiovascular: Normal rate, regular rhythm and normal heart sounds.   Pulmonary/Chest: Effort normal and breath sounds normal. She has no wheezes.   Abdominal: Soft.   Musculoskeletal: Normal range of motion.   Neurological: She is alert and oriented to person, place, and time.   Skin: Skin is warm and dry.   Psychiatric: She has a normal mood and affect. Thought content normal.   Vitals reviewed.        6/1/19- PET- 1.  No definite evidence of hypermetabolic tumor.  Specifically, there is no evidence of residual/recurrent squamous cell carcinoma of the right upper lobe status post wedge resection.  2..  Increased uptake associated with new L2 compression fracture.  Given the absence of other findings of metastatic disease in the soft tissues or bones, benign etiology is favored.    7/29/19- Chest CT- Stable postsurgical change of right upper lobe wedge resection.  No evidence for new or residual/recurrent disease. Severe centrilobular emphysema with scattered reticular and ground-glass opacities. Dense coronary atherosclerosis. Status post L2 vertebroplasty    Assessment:       74 y.o. female with arthritis, COPD, HTN, osteoporosis, CKD presents for 6 month follow-up from right VATS upper lobe wedge resection.    Plan:       Doing well.  CT shows no evidence of disease recurrence or metastasis.  RTC in 6 months with chest CT.

## 2019-07-30 PROBLEM — C34.91 SQUAMOUS CELL CARCINOMA OF RIGHT LUNG: Status: ACTIVE | Noted: 2019-01-16

## 2019-08-15 ENCOUNTER — OFFICE VISIT (OUTPATIENT)
Dept: FAMILY MEDICINE | Facility: CLINIC | Age: 74
End: 2019-08-15
Payer: MEDICARE

## 2019-08-15 VITALS
HEART RATE: 58 BPM | RESPIRATION RATE: 18 BRPM | WEIGHT: 92.94 LBS | BODY MASS INDEX: 18.25 KG/M2 | TEMPERATURE: 98 F | OXYGEN SATURATION: 99 % | SYSTOLIC BLOOD PRESSURE: 126 MMHG | HEIGHT: 60 IN | DIASTOLIC BLOOD PRESSURE: 58 MMHG

## 2019-08-15 DIAGNOSIS — S32.020D CLOSED COMPRESSION FRACTURE OF L2 LUMBAR VERTEBRA WITH ROUTINE HEALING, SUBSEQUENT ENCOUNTER: ICD-10-CM

## 2019-08-15 DIAGNOSIS — R07.81 RIB PAIN: ICD-10-CM

## 2019-08-15 DIAGNOSIS — G47.00 INSOMNIA, UNSPECIFIED TYPE: Primary | ICD-10-CM

## 2019-08-15 DIAGNOSIS — Z13.6 ENCOUNTER FOR LIPID SCREENING FOR CARDIOVASCULAR DISEASE: ICD-10-CM

## 2019-08-15 DIAGNOSIS — B35.1 NAIL FUNGUS: ICD-10-CM

## 2019-08-15 DIAGNOSIS — M85.88 OSTEOPENIA OF LUMBAR SPINE: ICD-10-CM

## 2019-08-15 DIAGNOSIS — Z13.220 ENCOUNTER FOR LIPID SCREENING FOR CARDIOVASCULAR DISEASE: ICD-10-CM

## 2019-08-15 DIAGNOSIS — M81.0 OSTEOPOROSIS, UNSPECIFIED OSTEOPOROSIS TYPE, UNSPECIFIED PATHOLOGICAL FRACTURE PRESENCE: Chronic | ICD-10-CM

## 2019-08-15 DIAGNOSIS — I10 ESSENTIAL HYPERTENSION: Chronic | ICD-10-CM

## 2019-08-15 DIAGNOSIS — M51.36 DDD (DEGENERATIVE DISC DISEASE), LUMBAR: ICD-10-CM

## 2019-08-15 PROCEDURE — 3078F DIAST BP <80 MM HG: CPT | Mod: CPTII,S$GLB,, | Performed by: FAMILY MEDICINE

## 2019-08-15 PROCEDURE — 1101F PR PT FALLS ASSESS DOC 0-1 FALLS W/OUT INJ PAST YR: ICD-10-PCS | Mod: CPTII,S$GLB,, | Performed by: FAMILY MEDICINE

## 2019-08-15 PROCEDURE — 99999 PR PBB SHADOW E&M-EST. PATIENT-LVL IV: ICD-10-PCS | Mod: PBBFAC,,, | Performed by: FAMILY MEDICINE

## 2019-08-15 PROCEDURE — 99214 OFFICE O/P EST MOD 30 MIN: CPT | Mod: S$GLB,,, | Performed by: FAMILY MEDICINE

## 2019-08-15 PROCEDURE — 3074F PR MOST RECENT SYSTOLIC BLOOD PRESSURE < 130 MM HG: ICD-10-PCS | Mod: CPTII,S$GLB,, | Performed by: FAMILY MEDICINE

## 2019-08-15 PROCEDURE — 3078F PR MOST RECENT DIASTOLIC BLOOD PRESSURE < 80 MM HG: ICD-10-PCS | Mod: CPTII,S$GLB,, | Performed by: FAMILY MEDICINE

## 2019-08-15 PROCEDURE — 99499 RISK ADDL DX/OHS AUDIT: ICD-10-PCS | Mod: S$GLB,,, | Performed by: FAMILY MEDICINE

## 2019-08-15 PROCEDURE — 3074F SYST BP LT 130 MM HG: CPT | Mod: CPTII,S$GLB,, | Performed by: FAMILY MEDICINE

## 2019-08-15 PROCEDURE — 99999 PR PBB SHADOW E&M-EST. PATIENT-LVL IV: CPT | Mod: PBBFAC,,, | Performed by: FAMILY MEDICINE

## 2019-08-15 PROCEDURE — 99214 PR OFFICE/OUTPT VISIT, EST, LEVL IV, 30-39 MIN: ICD-10-PCS | Mod: S$GLB,,, | Performed by: FAMILY MEDICINE

## 2019-08-15 PROCEDURE — 99499 UNLISTED E&M SERVICE: CPT | Mod: S$GLB,,, | Performed by: FAMILY MEDICINE

## 2019-08-15 PROCEDURE — 1101F PT FALLS ASSESS-DOCD LE1/YR: CPT | Mod: CPTII,S$GLB,, | Performed by: FAMILY MEDICINE

## 2019-08-15 RX ORDER — TERBINAFINE HYDROCHLORIDE 250 MG/1
250 TABLET ORAL DAILY
Qty: 90 TABLET | Refills: 1 | Status: SHIPPED | OUTPATIENT
Start: 2019-08-15 | End: 2019-09-14

## 2019-08-15 RX ORDER — TEMAZEPAM 7.5 MG/1
15 CAPSULE ORAL NIGHTLY PRN
Qty: 60 CAPSULE | Refills: 2 | Status: SHIPPED | OUTPATIENT
Start: 2019-08-15 | End: 2019-09-25

## 2019-08-15 NOTE — PROGRESS NOTES
Chief Complaint   Patient presents with    Back Pain    Insomnia    Nail Problem       HPI  Ariadna Villegas is a 74 y.o. female with multiple medical diagnoses as listed in the medical history and problem list that presents for follow-up for back pain, trouble sleeping and nail problem    Low back pain- she is s/p JAZMIN in her lower back with Dr. Cazares, she is not taking pain pills any more. She is having pain in her right rib that is not worse with taking a deep breath but is very painful    Insomnia- she still has trouble sleeping and would like to resume restoril.     PAST MEDICAL HISTORY:  Past Medical History:   Diagnosis Date    Arthritis     neck and back    COPD (chronic obstructive pulmonary disease)     Malignant hypertension     Non-small cell lung cancer 2018    Osteoporosis, postmenopausal     Tobacco use disorder        PAST SURGICAL HISTORY:  Past Surgical History:   Procedure Laterality Date    Biopsy-Lung N/A 2018    Performed by [facility] OR [address] [place] [address]     x3      HYSTERECTOMY      LUNG CANCER SURGERY Right 2019    LYMPHADENECTOMY Right 2019    [facility]    PARTIAL HYSTERECTOMY      , after her third     SALPINGOOPHORECTOMY      , for a ovarian cyst    VATS, WITH WEDGE RESECTION, LUNG Right 2019    Performed by Celso Jewell MD at [facility] OR [address] [place] [address]       SOCIAL HISTORY:  Social History     Socioeconomic History    Marital status:      Spouse name: Not on file    Number of children: Not on file    Years of education: Not on file    Highest education level: Not on file   Occupational History    Occupation: Retired   Social Needs    Financial resource strain: Not on file    Food insecurity:     Worry: Not on file     Inability: Not on file    Transportation needs:     Medical: Not on file     Non-medical: Not on file   Tobacco Use    Smoking status:  Former Smoker     Packs/day: 0.15     Years: 60.00     Pack years: 9.00     Types: Cigarettes    Smokeless tobacco: Never Used    Tobacco comment: quit x 2-3 wks. ago   Substance and Sexual Activity    Alcohol use: Yes     Comment: socially    Drug use: No    Sexual activity: Never     Partners: Male     Comment:    Lifestyle    Physical activity:     Days per week: Not on file     Minutes per session: Not on file    Stress: Not on file   Relationships    Social connections:     Talks on phone: Not on file     Gets together: Not on file     Attends Scientologist service: Not on file     Active member of club or organization: Not on file     Attends meetings of clubs or organizations: Not on file     Relationship status: Not on file   Other Topics Concern    Not on file   Social History Narrative    Not on file       FAMILY HISTORY:  Family History   Problem Relation Age of Onset    Heart disease Mother     Diabetes Mother     Stroke Father     Heart disease Sister     Anesthesia problems Neg Hx        ALLERGIES AND MEDICATIONS: updated and reviewed.  Review of patient's allergies indicates:   Allergen Reactions    Lunesta [eszopiclone] Other (See Comments)     Sleep walking     Current Outpatient Medications   Medication Sig Dispense Refill    aspirin (ECOTRIN) 81 MG EC tablet Take 1 tablet (81 mg total) by mouth once daily.      losartan-hydrochlorothiazide 100-25 mg (HYZAAR) 100-25 mg per tablet TAKE 1 TABLET BY MOUTH EVERY DAY 90 tablet 1    rosuvastatin (CRESTOR) 20 MG tablet Take 1 tablet (20 mg total) by mouth once daily. (Patient taking differently: Take 20 mg by mouth every morning. ) 30 tablet 11    temazepam (RESTORIL) 7.5 MG Cap Take 2 capsules (15 mg total) by mouth nightly as needed. 60 capsule 2    terbinafine HCl (LAMISIL) 250 mg tablet Take 1 tablet (250 mg total) by mouth once daily. 90 tablet 1     Current Facility-Administered Medications   Medication Dose Route Frequency  Provider Last Rate Last Dose    cyanocobalamin injection 100 mcg  100 mcg Intramuscular Q30 Days MENDEL Wolf MD   100 mcg at 05/08/17 1509       ROS  Review of Systems   Constitutional: Negative for chills, diaphoresis, fatigue, fever and unexpected weight change.   HENT: Negative for rhinorrhea, sinus pressure, sore throat and tinnitus.    Eyes: Negative for photophobia and visual disturbance.   Respiratory: Negative for cough, shortness of breath and wheezing.    Cardiovascular: Negative for chest pain and palpitations.   Gastrointestinal: Negative for abdominal pain, blood in stool, constipation, diarrhea, nausea and vomiting.   Genitourinary: Negative for dysuria, flank pain, frequency and vaginal discharge.   Musculoskeletal: Positive for arthralgias and back pain. Negative for joint swelling.   Skin: Negative for rash.   Neurological: Negative for speech difficulty, weakness, light-headedness and headaches.   Psychiatric/Behavioral: Negative for behavioral problems and dysphoric mood.       Physical Exam  Vitals:    08/15/19 1106   BP: (!) 126/58   Pulse: (!) 58   Resp: 18   Temp: 98 °F (36.7 °C)   TempSrc: Oral   SpO2: 99%   Weight: 42.1 kg (92 lb 14.8 oz)   Height: 5' (1.524 m)    Body mass index is 18.15 kg/m².  Weight: 42.1 kg (92 lb 14.8 oz)   Height: 5' (152.4 cm)     Physical Exam   Constitutional: She is oriented to person, place, and time. She appears well-developed and well-nourished. No distress.   HENT:   Head: Normocephalic and atraumatic.   Eyes: EOM are normal.   Neck: Neck supple.   Cardiovascular: Normal rate and regular rhythm. Exam reveals no gallop and no friction rub.   No murmur heard.  Pulmonary/Chest: Effort normal and breath sounds normal. No respiratory distress. She has no wheezes. She has no rales. She exhibits tenderness.       Lymphadenopathy:     She has no cervical adenopathy.   Neurological: She is alert and oriented to person, place, and time.   Skin: Skin is warm  and dry. No rash noted.   Psychiatric: She has a normal mood and affect. Her behavior is normal.   Nursing note and vitals reviewed.      Health Maintenance       Date Due Completion Date    Shingles Vaccine (1 of 2) 01/30/1994 ---    Lipid Panel 07/26/2019 7/26/2018    Influenza Vaccine (1) 08/01/2019 ---    Mammogram 04/06/2020 4/6/2018    Override on 3/28/2017: Declined    Override on 3/25/2014: Declined (not at this present time)    Override on 5/14/2012: Done    High Dose Statin 08/15/2020 8/15/2019    DEXA SCAN 07/26/2021 7/26/2018    Override on 3/25/2014: Declined (not at this present time)    Override on 6/12/2012: Done    Colonoscopy 10/30/2021 10/30/2018    Override on 1/1/2008: Done    Override on 5/14/2006: Done    TETANUS VACCINE 08/24/2026 8/24/2016 (Declined)    Override on 8/24/2016: Declined          Health maintenance reviewed and addressed as ordered      ASSESSMENT     1. Insomnia, unspecified type    2. Essential hypertension    3. Closed compression fracture of L2 lumbar vertebra with routine healing, subsequent encounter    4. DDD (degenerative disc disease), lumbar    5. Osteopenia of lumbar spine    6. Osteoporosis, unspecified osteoporosis type, unspecified pathological fracture presence    7. Encounter for lipid screening for cardiovascular disease    8. Rib pain    9. Nail fungus        PLAN:     Problem List Items Addressed This Visit        Neuro    Closed compression fracture of second lumbar vertebra on MRI 5/22/2019  -continue f/u with pain mgmt, she has also stopped taking neurontin as this is not helping her symptoms    Overview     5/22/19 MRI 1. Mild acute/subacute L2 vertebral body superior endplate compression fracture.  Minimal retropulsion is seen into the anterior spinal canal at this level with no evidence of spinal canal stenosis.  2. Small indeterminate T1 hypointense focus within the L2 vertebral body.  Although this does not appear to be resulting in a pathologic  fracture, potential metastatic lesion not excluded given patient's malignancy history.  Future post-contrast MR follow-up may be obtained versus evaluation on future PET/CT.  No hypermetabolic lesion was seen in this region on previous PET/CT from December 2018.         DDD (degenerative disc disease), lumbar  -continue current regimen of JAZMIN per pain management       Cardiac/Vascular    Essential hypertension (Chronic)  -controlled on current regimen       Orthopedic    Osteopenia of lumbar spine  -will xray her ribs to make sure she has no rib fracture    Osteoporosis (Chronic)  -currently not treated for osteoporosis, recommend calcium and vitamin D  -she has stopped taking medication due to side effects       Other    Insomnia - Primary  -resume medication for sleep    Relevant Medications    temazepam (RESTORIL) 7.5 MG Cap      Other Visit Diagnoses     Encounter for lipid screening for cardiovascular disease      -as ordered       Relevant Orders    Lipid panel    Rib pain      -eval to r/o fracture due to untreated osteoporosis    Relevant Orders    X-Ray Ribs 2 View Right    Nail fungus      -continue medication as she is having improvement in her nail fungus  -liver enzymes WNL    Relevant Medications    terbinafine HCl (LAMISIL) 250 mg tablet            Kirti Morris MD  08/15/2019 11:59 AM        Follow up in about 3 months (around 11/15/2019) for Follow up.

## 2019-08-15 NOTE — PROGRESS NOTES
Pt refused Xray stated that the other locations is to far. If the pain continues to hurt than she will call and schedule an xray.    Pt is able to do her Xray at the [place] location will schedule around the same time as her blood work.

## 2019-08-16 ENCOUNTER — HOSPITAL ENCOUNTER (OUTPATIENT)
Dept: RADIOLOGY | Facility: HOSPITAL | Age: 74
Discharge: HOME | End: 2019-08-16
Attending: FAMILY MEDICINE
Payer: MEDICARE

## 2019-08-16 DIAGNOSIS — R07.81 RIB PAIN: ICD-10-CM

## 2019-08-16 PROCEDURE — 71100 X-RAY EXAM RIBS UNI 2 VIEWS: CPT | Mod: 26,RT,, | Performed by: RADIOLOGY

## 2019-08-16 PROCEDURE — 71100 XR RIBS 2 VIEW RIGHT: ICD-10-PCS | Mod: 26,RT,, | Performed by: RADIOLOGY

## 2019-08-16 PROCEDURE — 71100 X-RAY EXAM RIBS UNI 2 VIEWS: CPT | Mod: TC,FY,PO,RT

## 2019-08-16 NOTE — PROGRESS NOTES
Per pt - she had back surgery a couple of months ago; she also had lung cancer & had surgery on her R lung to remove the cancer. KAYLEE

## 2019-09-03 ENCOUNTER — TELEPHONE (OUTPATIENT)
Dept: HEMATOLOGY/ONCOLOGY | Facility: CLINIC | Age: 74
End: 2019-09-03

## 2019-09-05 ENCOUNTER — TELEPHONE (OUTPATIENT)
Dept: OBSTETRICS AND GYNECOLOGY | Facility: CLINIC | Age: 74
End: 2019-09-05

## 2019-09-05 NOTE — TELEPHONE ENCOUNTER
Spoke with Patients Daughter and was confused about getting a call to see Dr. Feliciano tomorrow.  Patient was a previous Patient of Dr. Smiley.  I explained to Daughter that in order for her to get Medication Refills and needing to get established with another Cardiologist for care.  Daughter understood and will call her Mom to let her know that she has an appt to see Dr. Feliciano tomorrow morning.  If they cant make it in tomorrow, she will call.  I explained the importance of getting established with Dr. Feliciano so he can monitor her Health and Medications for Cardiology purposes.

## 2019-09-05 NOTE — TELEPHONE ENCOUNTER
----- Message from Aris Lee sent at 9/5/2019  4:32 PM CDT -----  Contact: daughter /Cici    Name of Who is Calling:     What is the request in detail: request call back in reference to appointment that is schedule for tomorrow Please contact to further discuss and advise      Can the clinic reply by [facility]: no    What Number to Call Back if not in [facility]:  [phone]

## 2019-09-15 ENCOUNTER — PATIENT OUTREACH (OUTPATIENT)
Dept: ADMINISTRATIVE | Facility: OTHER | Age: 74
End: 2019-09-15

## 2019-09-16 ENCOUNTER — OFFICE VISIT (OUTPATIENT)
Dept: CARDIOLOGY | Facility: CLINIC | Age: 74
End: 2019-09-16
Payer: MEDICARE

## 2019-09-16 ENCOUNTER — LAB VISIT (OUTPATIENT)
Dept: LAB | Facility: HOSPITAL | Age: 74
End: 2019-09-16
Attending: INTERNAL MEDICINE
Payer: MEDICARE

## 2019-09-16 VITALS
RESPIRATION RATE: 16 BRPM | SYSTOLIC BLOOD PRESSURE: 139 MMHG | OXYGEN SATURATION: 94 % | HEART RATE: 65 BPM | DIASTOLIC BLOOD PRESSURE: 63 MMHG | BODY MASS INDEX: 18.94 KG/M2 | WEIGHT: 97 LBS

## 2019-09-16 DIAGNOSIS — Z76.89 ESTABLISHING CARE WITH NEW DOCTOR, ENCOUNTER FOR: ICD-10-CM

## 2019-09-16 DIAGNOSIS — I10 ESSENTIAL HYPERTENSION: Chronic | ICD-10-CM

## 2019-09-16 DIAGNOSIS — Z13.220 ENCOUNTER FOR LIPID SCREENING FOR CARDIOVASCULAR DISEASE: ICD-10-CM

## 2019-09-16 DIAGNOSIS — Z13.6 ENCOUNTER FOR LIPID SCREENING FOR CARDIOVASCULAR DISEASE: ICD-10-CM

## 2019-09-16 DIAGNOSIS — M89.8X9 BONE PAIN: ICD-10-CM

## 2019-09-16 DIAGNOSIS — C34.91 MALIGNANT NEOPLASM OF RIGHT LUNG, UNSPECIFIED PART OF LUNG: ICD-10-CM

## 2019-09-16 DIAGNOSIS — M85.88 OSTEOPENIA OF LUMBAR SPINE: ICD-10-CM

## 2019-09-16 LAB
ALBUMIN SERPL BCP-MCNC: 3.9 G/DL (ref 3.5–5.2)
ALP SERPL-CCNC: 114 U/L (ref 55–135)
ALT SERPL W/O P-5'-P-CCNC: 13 U/L (ref 10–44)
ANION GAP SERPL CALC-SCNC: 9 MMOL/L (ref 8–16)
AST SERPL-CCNC: 21 U/L (ref 10–40)
BASOPHILS # BLD AUTO: 0.03 K/UL (ref 0–0.2)
BASOPHILS NFR BLD: 0.4 % (ref 0–1.9)
BILIRUB SERPL-MCNC: 0.2 MG/DL (ref 0.1–1)
BUN SERPL-MCNC: 25 MG/DL (ref 8–23)
CALCIUM SERPL-MCNC: 10.2 MG/DL (ref 8.7–10.5)
CHLORIDE SERPL-SCNC: 102 MMOL/L (ref 95–110)
CHOLEST SERPL-MCNC: 141 MG/DL (ref 120–199)
CHOLEST/HDLC SERPL: 3 {RATIO} (ref 2–5)
CO2 SERPL-SCNC: 23 MMOL/L (ref 23–29)
CREAT SERPL-MCNC: 1 MG/DL (ref 0.5–1.4)
DIFFERENTIAL METHOD: NORMAL
EOSINOPHIL # BLD AUTO: 0.2 K/UL (ref 0–0.5)
EOSINOPHIL NFR BLD: 2.5 % (ref 0–8)
ERYTHROCYTE [DISTWIDTH] IN BLOOD BY AUTOMATED COUNT: 14.2 % (ref 11.5–14.5)
EST. GFR  (AFRICAN AMERICAN): >60 ML/MIN/1.73 M^2
EST. GFR  (NON AFRICAN AMERICAN): 55.2 ML/MIN/1.73 M^2
GLUCOSE SERPL-MCNC: 106 MG/DL (ref 70–110)
HCT VFR BLD AUTO: 37.1 % (ref 37–48.5)
HDLC SERPL-MCNC: 47 MG/DL (ref 40–75)
HDLC SERPL: 33.3 % (ref 20–50)
HGB BLD-MCNC: 12 G/DL (ref 12–16)
LDLC SERPL CALC-MCNC: 72.4 MG/DL (ref 63–159)
LYMPHOCYTES # BLD AUTO: 1.6 K/UL (ref 1–4.8)
LYMPHOCYTES NFR BLD: 23.9 % (ref 18–48)
MCH RBC QN AUTO: 28.9 PG (ref 27–31)
MCHC RBC AUTO-ENTMCNC: 32.3 G/DL (ref 32–36)
MCV RBC AUTO: 89 FL (ref 82–98)
MONOCYTES # BLD AUTO: 0.6 K/UL (ref 0.3–1)
MONOCYTES NFR BLD: 8.5 % (ref 4–15)
NEUTROPHILS # BLD AUTO: 4.4 K/UL (ref 1.8–7.7)
NEUTROPHILS NFR BLD: 65 % (ref 38–73)
NONHDLC SERPL-MCNC: 94 MG/DL
PLATELET # BLD AUTO: 334 K/UL (ref 150–350)
PMV BLD AUTO: 9.2 FL (ref 9.2–12.9)
POTASSIUM SERPL-SCNC: 5.7 MMOL/L (ref 3.5–5.1)
PROT SERPL-MCNC: 7.4 G/DL (ref 6–8.4)
RBC # BLD AUTO: 4.15 M/UL (ref 4–5.4)
SODIUM SERPL-SCNC: 134 MMOL/L (ref 136–145)
TRIGL SERPL-MCNC: 108 MG/DL (ref 30–150)
WBC # BLD AUTO: 6.74 K/UL (ref 3.9–12.7)

## 2019-09-16 PROCEDURE — 3078F DIAST BP <80 MM HG: CPT | Mod: CPTII,S$GLB,, | Performed by: INTERNAL MEDICINE

## 2019-09-16 PROCEDURE — 3075F PR MOST RECENT SYSTOLIC BLOOD PRESS GE 130-139MM HG: ICD-10-PCS | Mod: CPTII,S$GLB,, | Performed by: INTERNAL MEDICINE

## 2019-09-16 PROCEDURE — 99214 PR OFFICE/OUTPT VISIT, EST, LEVL IV, 30-39 MIN: ICD-10-PCS | Mod: S$GLB,,, | Performed by: INTERNAL MEDICINE

## 2019-09-16 PROCEDURE — 3075F SYST BP GE 130 - 139MM HG: CPT | Mod: CPTII,S$GLB,, | Performed by: INTERNAL MEDICINE

## 2019-09-16 PROCEDURE — 80061 LIPID PANEL: CPT

## 2019-09-16 PROCEDURE — 93000 ELECTROCARDIOGRAM COMPLETE: CPT | Mod: S$GLB,,, | Performed by: INTERNAL MEDICINE

## 2019-09-16 PROCEDURE — 3078F PR MOST RECENT DIASTOLIC BLOOD PRESSURE < 80 MM HG: ICD-10-PCS | Mod: CPTII,S$GLB,, | Performed by: INTERNAL MEDICINE

## 2019-09-16 PROCEDURE — 36415 COLL VENOUS BLD VENIPUNCTURE: CPT | Mod: PO

## 2019-09-16 PROCEDURE — 99999 PR PBB SHADOW E&M-EST. PATIENT-LVL III: ICD-10-PCS | Mod: PBBFAC,,, | Performed by: INTERNAL MEDICINE

## 2019-09-16 PROCEDURE — 99214 OFFICE O/P EST MOD 30 MIN: CPT | Mod: S$GLB,,, | Performed by: INTERNAL MEDICINE

## 2019-09-16 PROCEDURE — 80053 COMPREHEN METABOLIC PANEL: CPT

## 2019-09-16 PROCEDURE — 1101F PT FALLS ASSESS-DOCD LE1/YR: CPT | Mod: CPTII,S$GLB,, | Performed by: INTERNAL MEDICINE

## 2019-09-16 PROCEDURE — 85025 COMPLETE CBC W/AUTO DIFF WBC: CPT

## 2019-09-16 PROCEDURE — 99999 PR PBB SHADOW E&M-EST. PATIENT-LVL III: CPT | Mod: PBBFAC,,, | Performed by: INTERNAL MEDICINE

## 2019-09-16 PROCEDURE — 1101F PR PT FALLS ASSESS DOC 0-1 FALLS W/OUT INJ PAST YR: ICD-10-PCS | Mod: CPTII,S$GLB,, | Performed by: INTERNAL MEDICINE

## 2019-09-16 PROCEDURE — 93000 EKG 12-LEAD: ICD-10-PCS | Mod: S$GLB,,, | Performed by: INTERNAL MEDICINE

## 2019-09-16 RX ORDER — ROSUVASTATIN CALCIUM 20 MG/1
20 TABLET, COATED ORAL DAILY
Qty: 30 TABLET | Refills: 11 | Status: SHIPPED | OUTPATIENT
Start: 2019-09-16 | End: 2020-11-30

## 2019-09-16 RX ORDER — METOPROLOL SUCCINATE 50 MG/1
50 TABLET, EXTENDED RELEASE ORAL DAILY
Qty: 90 TABLET | Refills: 3 | Status: SHIPPED | OUTPATIENT
Start: 2019-09-16 | End: 2020-08-03

## 2019-09-16 RX ORDER — LOSARTAN POTASSIUM AND HYDROCHLOROTHIAZIDE 25; 100 MG/1; MG/1
1 TABLET ORAL DAILY
Qty: 90 TABLET | Refills: 3 | Status: ON HOLD | OUTPATIENT
Start: 2019-09-16 | End: 2020-06-25 | Stop reason: HOSPADM

## 2019-09-16 NOTE — PROGRESS NOTES
CARDIOVASCULAR CONSULTATION    REASON FOR CONSULT:   Ariadna Villegas is a 74 y.o. female who presents for follow-up.      HISTORY OF PRESENT ILLNESS:     Patient is a pleasant 74-year-old lady with a past medical history significant for lung cancer status post wedge resection, recent PET scan did not show any recurrence.  Here for follow-up.  Denies any chest pains at rest on exertion, orthopnea, PND, swelling of feet.  Denies any claudication like symptoms.  However continues to smoke unfortunately.  Smoking cessation was again emphasized.      PAST MEDICAL HISTORY:     Past Medical History:   Diagnosis Date    Arthritis     neck and back    COPD (chronic obstructive pulmonary disease)     Malignant hypertension     Non-small cell lung cancer 2018    Osteoporosis, postmenopausal     Tobacco use disorder        PAST SURGICAL HISTORY:     Past Surgical History:   Procedure Laterality Date    Biopsy-Lung N/A 2018    Performed by [facility]     x3      HYSTERECTOMY      LUNG CANCER SURGERY Right 2019    LYMPHADENECTOMY Right 2019    Performed by [facility] Jewell [facility]    PARTIAL HYSTERECTOMY      , after her third     SALPINGOOPHORECTOMY      , for a ovarian cyst    VATS, WITH WEDGE RESECTION, LUNG Right 2019    Performed by Celso Jewell MD at [facility] OR [address] [place] [address]       ALLERGIES AND MEDICATION:     Review of patient's allergies indicates:   Allergen Reactions    Lunesta [eszopiclone] Other (See Comments)     Sleep walking        Medication List           Accurate as of 19  2:59 PM. If you have any questions, ask your nurse or doctor.               START taking these medications    metoprolol succinate 50 MG 24 hr tablet  Commonly known as:  TOPROL-XL  Take 1 tablet (50 mg total) by mouth once daily.  Started by:  Daniela Feliciano MD        CHANGE how you take these medications    rosuvastatin 20  MG tablet  Commonly known as:  CRESTOR  Take 1 tablet (20 mg total) by mouth once daily.  What changed:  when to take this        CONTINUE taking these medications    aspirin 81 MG EC tablet  Commonly known as:  ECOTRIN  Take 1 tablet (81 mg total) by mouth once daily.     losartan-hydrochlorothiazide 100-25 mg 100-25 mg per tablet  Commonly known as:  HYZAAR  Take 1 tablet by mouth once daily.     temazepam 7.5 MG Cap  Commonly known as:  RESTORIL  Take 2 capsules (15 mg total) by mouth nightly as needed.           Where to Get Your Medications      These medications were sent to [id number] - [place] [address] [facility] [address] [address]    Hours:  24-hours Phone:  [phone]   · losartan-hydrochlorothiazide 100-25 mg 100-25 mg per tablet  · metoprolol succinate 50 MG 24 hr tablet  · rosuvastatin 20 MG tablet         SOCIAL HISTORY:     Social History     Socioeconomic History    Marital status:      Spouse name: Not on file    Number of children: Not on file    Years of education: Not on file    Highest education level: Not on file   Occupational History    Occupation: Retired   Social Needs    Financial resource strain: Not on file    Food insecurity:     Worry: Not on file     Inability: Not on file    Transportation needs:     Medical: Not on file     Non-medical: Not on file   Tobacco Use    Smoking status: Former Smoker     Packs/day: 0.15     Years: 60.00     Pack years: 9.00     Types: Cigarettes    Smokeless tobacco: Never Used    Tobacco comment: quit x 2-3 wks. ago   Substance and Sexual Activity    Alcohol use: Yes     Comment: socially    Drug use: No    Sexual activity: Never     Partners: Male     Comment:    Lifestyle    Physical activity:     Days per week: Not on file     Minutes per session: Not on file    Stress: Not on file   Relationships    Social connections:     Talks on phone: Not on  file     Gets together: Not on file     Attends [employer] service: Not on file     Active member of club or organization: Not on file     Attends meetings of clubs or organizations: Not on file     Relationship status: Not on file   Other Topics Concern    Not on file   Social History Narrative    Not on file       FAMILY HISTORY:     Family History   Problem Relation Age of Onset    Heart disease Mother     Diabetes Mother     Stroke Father     Heart disease Sister     Anesthesia problems Neg Hx        REVIEW OF SYSTEMS:   Review of Systems   Constitutional: Positive for malaise/fatigue.   HENT: Negative.    Eyes: Negative.    Respiratory: Negative.    Cardiovascular: Negative.    Gastrointestinal: Negative.    Genitourinary: Negative.    Musculoskeletal: Negative.    Skin: Negative.    Neurological: Negative.    Endo/Heme/Allergies: Negative.        A 10 point review of systems was performed and all the pertinent positives have been mentioned. Rest of review of systems was negative.        PHYSICAL EXAM:     Vitals:    09/16/19 1438   BP: 139/63   Pulse: 65   Resp: 16    Body mass index is 18.94 kg/m².  Weight: 44 kg (97 lb)         Physical Exam   Constitutional: She is oriented to person, place, and time. She appears well-developed and well-nourished. She is active.   HENT:   Head: Normocephalic and atraumatic.   Right Ear: Hearing normal.   Left Ear: Hearing normal.   Nose: Nose normal.   Mouth/Throat: Mucous membranes are normal.   Eyes: Pupils are equal, round, and reactive to light. Conjunctivae and lids are normal.   Neck: Normal range of motion. Neck supple.   Cardiovascular: Normal rate, regular rhythm, normal heart sounds, intact distal pulses and normal pulses.   Pulmonary/Chest: Effort normal and breath sounds normal.   Abdominal: Soft. Normal appearance. There is no tenderness.   Musculoskeletal: She exhibits no edema or deformity.   Neurological: She is alert and oriented to person, place, and  time.   Skin: Skin is warm, dry and intact.   Psychiatric: She has a normal mood and affect. Her speech is normal.   Nursing note and vitals reviewed.        DATA:     Laboratory:  CBC:  Recent Labs   Lab 12/14/18  1457 01/04/19  1509 05/24/19  1258   WBC 7.48 5.76 10.87   Hemoglobin 11.9 L 10.2 L 12.9   Hematocrit 35.6 L 32.4 L 40.1   Platelets 285 262 374 H       CHEMISTRIES:  Recent Labs   Lab 01/04/19  1509 05/24/19  1258 05/27/19  1625   Glucose 119 H 105 101   Sodium 134 L 132 L 136   Potassium 4.4 5.5 H 4.3   BUN, Bld 31 H 22 31 H   Creatinine 1.6 H 1.1 1.4   eGFR if  36.3 A 56.8 A 42 A   eGFR if non  31.5 A 49.2 A 37 A   Calcium 9.7 11.0 H 9.8       CARDIAC BIOMARKERS:  Recent Labs   Lab 11/12/17  1530 11/12/17  1848   Troponin I <0.006 0.008       COAGS:  Recent Labs   Lab 11/27/18  0714   INR 0.9       LIPIDS/LFTS:  Recent Labs   Lab 07/26/18  1102 12/14/18  1457 01/04/19  1509 05/24/19  1258   Cholesterol 221 H  --   --   --    Triglycerides 88  --   --   --    HDL 59  --   --   --    LDL Cholesterol 144.4  --   --   --    Non-HDL Cholesterol 162  --   --   --    AST 23 17 18 16   ALT 10 5 L 10 9 L       Hemoglobin A1C   Date Value Ref Range Status   11/12/2012 6.0 4.0 - 6.2 % Final       TSH  Recent Labs   Lab 07/26/18  1102   TSH 1.553       The 10-year ASCVD risk score (Yolijayy RONQUILLO Jr., et al., 2013) is: 24.6%    Values used to calculate the score:      Age: 74 years      Sex: Female      Is Non- : No      Diabetic: No      Tobacco smoker: No      Systolic Blood Pressure: 139 mmHg      Is BP treated: Yes      HDL Cholesterol: 59 mg/dL      Total Cholesterol: 221 mg/dL           Cardiovascular Testing:    EKG: (personally reviewed tracing)  Normal sinus      ASSESSMENT AND PLAN     Patient Active Problem List   Diagnosis    Renal artery stenosis    DDD (degenerative disc disease), lumbar    Cervicalgia    Insomnia    Mastoid pain    Osteoporosis     Hyperlipidemia    Thoracolumbar back pain    Generalized osteoarthrosis, unspecified site    BPV (benign positional vertigo)    Hypovitaminosis D    CKD (chronic kidney disease) stage 3, GFR 30-59 ml/min    Chest pain    Essential hypertension    COPD (chronic obstructive pulmonary disease)    Fatigue    Sedative hypnotic or anxiolytic dependence    B12 deficiency    Centrilobular emphysema    Squamous cell carcinoma of right lung    Closed compression fracture of second lumbar vertebra on MRI 5/22/2019    Bone pain    Abnormal MRI, lumbar spine    Hypercalcemia    Osteopenia of lumbar spine       1.  Dyslipidemia:  Continue Crestor    2.  Mild bilateral carotid artery stenosis.  Continue aggressive risk factor modification and Crestor.  Continue baby aspirin.  Smoking cessation highly encouraged    3.  Nuclear stress test in 2018 did not show any significant ischemia.  2D echocardiogram showed normal structure.    Follow-up in 6 months        Thank you very much for involving me in the care of your patient.  Please do not hesitate to contact me if there are any questions.      Daniela Feliciano MD, FACC, [facility]  Interventional Cardiologist, [facility].           This note was dictated with the help of speech recognition software.  There might be un-intended errors and/or substitutions.

## 2019-09-25 ENCOUNTER — OFFICE VISIT (OUTPATIENT)
Dept: HEMATOLOGY/ONCOLOGY | Facility: CLINIC | Age: 74
End: 2019-09-25
Payer: MEDICARE

## 2019-09-25 VITALS
HEART RATE: 74 BPM | WEIGHT: 93.06 LBS | HEIGHT: 60 IN | SYSTOLIC BLOOD PRESSURE: 130 MMHG | BODY MASS INDEX: 18.27 KG/M2 | OXYGEN SATURATION: 95 % | DIASTOLIC BLOOD PRESSURE: 62 MMHG | TEMPERATURE: 99 F

## 2019-09-25 DIAGNOSIS — C34.91 MALIGNANT NEOPLASM OF RIGHT LUNG, UNSPECIFIED PART OF LUNG: Primary | ICD-10-CM

## 2019-09-25 DIAGNOSIS — M85.88 OSTEOPENIA OF LUMBAR SPINE: ICD-10-CM

## 2019-09-25 DIAGNOSIS — J44.9 CHRONIC OBSTRUCTIVE PULMONARY DISEASE, UNSPECIFIED COPD TYPE: ICD-10-CM

## 2019-09-25 PROCEDURE — 3075F PR MOST RECENT SYSTOLIC BLOOD PRESS GE 130-139MM HG: ICD-10-PCS | Mod: CPTII,S$GLB,, | Performed by: INTERNAL MEDICINE

## 2019-09-25 PROCEDURE — 3075F SYST BP GE 130 - 139MM HG: CPT | Mod: CPTII,S$GLB,, | Performed by: INTERNAL MEDICINE

## 2019-09-25 PROCEDURE — 1101F PT FALLS ASSESS-DOCD LE1/YR: CPT | Mod: CPTII,S$GLB,, | Performed by: INTERNAL MEDICINE

## 2019-09-25 PROCEDURE — 99213 PR OFFICE/OUTPT VISIT, EST, LEVL III, 20-29 MIN: ICD-10-PCS | Mod: S$GLB,,, | Performed by: INTERNAL MEDICINE

## 2019-09-25 PROCEDURE — 3078F DIAST BP <80 MM HG: CPT | Mod: CPTII,S$GLB,, | Performed by: INTERNAL MEDICINE

## 2019-09-25 PROCEDURE — 1101F PR PT FALLS ASSESS DOC 0-1 FALLS W/OUT INJ PAST YR: ICD-10-PCS | Mod: CPTII,S$GLB,, | Performed by: INTERNAL MEDICINE

## 2019-09-25 PROCEDURE — 99999 PR PBB SHADOW E&M-EST. PATIENT-LVL III: ICD-10-PCS | Mod: PBBFAC,,, | Performed by: INTERNAL MEDICINE

## 2019-09-25 PROCEDURE — 99213 OFFICE O/P EST LOW 20 MIN: CPT | Mod: S$GLB,,, | Performed by: INTERNAL MEDICINE

## 2019-09-25 PROCEDURE — 3078F PR MOST RECENT DIASTOLIC BLOOD PRESSURE < 80 MM HG: ICD-10-PCS | Mod: CPTII,S$GLB,, | Performed by: INTERNAL MEDICINE

## 2019-09-25 PROCEDURE — 99999 PR PBB SHADOW E&M-EST. PATIENT-LVL III: CPT | Mod: PBBFAC,,, | Performed by: INTERNAL MEDICINE

## 2019-09-25 NOTE — PROGRESS NOTES
Chief Complaint :  Lung cancer    Hx of Present illness :  Patient is a 74 y.o. year old female who presents to the clinic today for  Oncology followup.. Initially had sx of bronchitis.  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 Patient has undergone Wedge resection.  Lymph nodes were negative for mets. Was having sx of back pain.  MRI   Spine showed small indeterminate focus of uptake in L2.  On prolia therapy for osteopenia.     Allergies :    Review of patient's allergies indicates:   Allergen Reactions    Lunesta [eszopiclone] Other (See Comments)     Sleep walking       Occupation :  Cleaning Houses    Transfusion :  None    Menstrual & obstetric Hx :   3; para 3.  Age of menarche: 15  Age of first pregnancy: 20  Lactation history: No  Age of menopause:  Hysterectomy at age 31  HRT:  Short time    Present Meds :   Medication List with Changes/Refills   Current Medications    ASPIRIN (ECOTRIN) 81 MG EC TABLET    Take 1 tablet (81 mg total) by mouth once daily.    LOSARTAN-HYDROCHLOROTHIAZIDE 100-25 MG (HYZAAR) 100-25 MG PER TABLET    Take 1 tablet by mouth once daily.    METOPROLOL SUCCINATE (TOPROL-XL) 50 MG 24 HR TABLET    Take 1 tablet (50 mg total) by mouth once daily.    ROSUVASTATIN (CRESTOR) 20 MG TABLET    Take 1 tablet (20 mg total) by mouth once daily.    TEMAZEPAM (RESTORIL) 7.5 MG CAP    Take 2 capsules (15 mg total) by mouth nightly as needed.       Past Medical Hx :  No hx of DVT or PE..  No hx of DM, PUD, Hepatitis, Yhyroid dysfunction, CVA , seizure disorder.    Past Medical Hx :  Past Medical History:   Diagnosis Date    Arthritis     neck and back    COPD (chronic obstructive pulmonary disease)     Malignant hypertension     Non-small cell lung cancer 2018    Osteoporosis, postmenopausal     Tobacco use disorder        Travel Hx :  N/A    Immunization :  Immunization History   Administered Date(s)  Administered    Pneumococcal Conjugate - 13 Valent 11/14/2016    Pneumococcal Polysaccharide - 23 Valent 10/14/2013       Family Hx :  Family History   Problem Relation Age of Onset    Heart disease Mother     Diabetes Mother     Stroke Father     Heart disease Sister     Anesthesia problems Neg Hx        Social Hx :  Social History     Socioeconomic History    Marital status:      Spouse name: Not on file    Number of children: Not on file    Years of education: Not on file    Highest education level: Not on file   Occupational History    Occupation: Retired   Social Needs    Financial resource strain: Not on file    Food insecurity:     Worry: Not on file     Inability: Not on file    Transportation needs:     Medical: Not on file     Non-medical: Not on file   Tobacco Use    Smoking status: Former Smoker     Packs/day: 0.15     Years: 60.00     Pack years: 9.00     Types: Cigarettes    Smokeless tobacco: Never Used    Tobacco comment: quit x 2-3 wks. ago   Substance and Sexual Activity    Alcohol use: Yes     Comment: socially    Drug use: No    Sexual activity: Never     Partners: Male     Comment:    Lifestyle    Physical activity:     Days per week: Not on file     Minutes per session: Not on file    Stress: Not on file   Relationships    Social connections:     Talks on phone: Not on file     Gets together: Not on file     Attends Nondenominational service: Not on file     Active member of club or organization: Not on file     Attends meetings of clubs or organizations: Not on file     Relationship status: Not on file   Other Topics Concern    Not on file   Social History Narrative    Not on file       Surgery : C.Section x 3; Hysterectomy ; Bilateral cataract surgery.  Colonoscopy 2018.     Symptoms :    Review of Systems   Constitutional: Positive for malaise/fatigue. Negative for chills, diaphoresis, fever and weight loss (fluctuates.).   HENT: Negative for congestion, hearing  loss, nosebleeds, sore throat and tinnitus.    Eyes: Negative for blurred vision, double vision and photophobia.   Respiratory: Positive for cough. Negative for shortness of breath.    Cardiovascular: Negative for chest pain, palpitations, orthopnea, claudication and leg swelling.   Gastrointestinal: Negative for abdominal pain, blood in stool, constipation, diarrhea, heartburn, nausea and vomiting.   Genitourinary: Negative for dysuria, flank pain, frequency, hematuria and urgency.   Musculoskeletal: Positive for back pain. Negative for falls, joint pain, myalgias and neck pain.   Skin: Negative for itching and rash.   Neurological: Negative for dizziness, tingling, tremors, sensory change and headaches.   Endo/Heme/Allergies: Negative for environmental allergies. Does not bruise/bleed easily.   Psychiatric/Behavioral: Negative for depression. The patient is not nervous/anxious and does not have insomnia.        Physical Exam :  Daughter, and daughter present.   Physical Exam   Constitutional: She is oriented to person, place, and time and well-developed, well-nourished, and in no distress. Vital signs are normal. No distress.   HENT:   Head: Normocephalic and atraumatic.   Right Ear: External ear normal.   Left Ear: External ear normal.   Nose: Nose normal.   Mouth/Throat: Oropharynx is clear and moist. No oropharyngeal exudate.   Eyes: Conjunctivae, EOM and lids are normal. Lids are everted and swept, no foreign bodies found. Right eye exhibits no discharge. Left eye exhibits no discharge. No scleral icterus.   Neck: Trachea normal, normal range of motion, full passive range of motion without pain and phonation normal. Neck supple. Normal carotid pulses, no hepatojugular reflux and no JVD present. No tracheal tenderness present. Carotid bruit is not present. No tracheal deviation present. No thyroid mass and no thyromegaly present.   Cardiovascular: Normal rate, regular rhythm, normal heart sounds, intact distal  pulses and normal pulses. PMI is not displaced. Exam reveals no gallop and no friction rub.   No murmur heard.  Pulmonary/Chest: Effort normal and breath sounds normal. No stridor. No apnea. No respiratory distress. She has no wheezes. She has no rales. She exhibits no tenderness.   Abdominal: Soft. Normal appearance. She exhibits no distension, no ascites and no mass. There is no tenderness. There is no rebound, no guarding and no CVA tenderness. No hernia.   Musculoskeletal: Normal range of motion. She exhibits no edema, tenderness or deformity.   Lymphadenopathy:        Head (right side): No submental, no submandibular, no tonsillar, no preauricular, no posterior auricular and no occipital adenopathy present.        Head (left side): No submental, no submandibular, no tonsillar, no preauricular, no posterior auricular and no occipital adenopathy present.     She has no cervical adenopathy.     She has no axillary adenopathy.        Right: No inguinal, no supraclavicular and no epitrochlear adenopathy present.        Left: No inguinal, no supraclavicular and no epitrochlear adenopathy present.   Neurological: She is alert and oriented to person, place, and time. She has normal sensation, normal strength, normal reflexes and intact cranial nerves. No cranial nerve deficit. She exhibits normal muscle tone. Gait normal. Coordination normal. GCS score is 15.   Skin: Skin is warm, dry and intact. No rash noted. She is not diaphoretic. No cyanosis or erythema. No pallor. Nails show no clubbing.   Psychiatric: Mood, memory, affect and judgment normal.   Nursing note and vitals reviewed.    Labs & Imaging :  05/27/19  : serum calcium ; 9.8; eGFR 37. 06/01/19 :  PET/CT ; no hypermetabolic activity to suggest progression or mets. Uptake L 2; radiologist feels it is probably benign.  X ray Lumbar spine on 5/17/19 : Osteopenia. No lytc or blastic activity.  09/16/19 : NFBS 106; Cr.1.0; Ca 10.0;  Bili 0.2. Liver enzymes  normal. eGFR 55.2.  hgb 12.0; Hct 37.1; MCV 89; platelets 334,000 ANC 4,400.       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  PTis,pN0.       Assessment & Plan:  Reviewed with patient and family members.    No evidence of mets. Patient has osteopenia. Get Dental evaluation and schedulia prolia injection q 6 months.  Consent obtained.

## 2020-01-08 ENCOUNTER — LAB VISIT (OUTPATIENT)
Dept: LAB | Facility: HOSPITAL | Age: 75
End: 2020-01-08
Attending: INTERNAL MEDICINE
Payer: MEDICARE

## 2020-01-08 DIAGNOSIS — J44.9 CHRONIC OBSTRUCTIVE PULMONARY DISEASE, UNSPECIFIED COPD TYPE: ICD-10-CM

## 2020-01-08 DIAGNOSIS — M85.88 OSTEOPENIA OF LUMBAR SPINE: ICD-10-CM

## 2020-01-08 DIAGNOSIS — C34.91 MALIGNANT NEOPLASM OF RIGHT LUNG, UNSPECIFIED PART OF LUNG: ICD-10-CM

## 2020-01-08 LAB
ALBUMIN SERPL BCP-MCNC: 3.7 G/DL (ref 3.5–5.2)
ALP SERPL-CCNC: 140 U/L (ref 55–135)
ALT SERPL W/O P-5'-P-CCNC: 12 U/L (ref 10–44)
ANION GAP SERPL CALC-SCNC: 9 MMOL/L (ref 8–16)
AST SERPL-CCNC: 23 U/L (ref 10–40)
BASOPHILS # BLD AUTO: 0.03 K/UL (ref 0–0.2)
BASOPHILS NFR BLD: 0.5 % (ref 0–1.9)
BILIRUB SERPL-MCNC: 0.2 MG/DL (ref 0.1–1)
BUN SERPL-MCNC: 19 MG/DL (ref 8–23)
CALCIUM SERPL-MCNC: 9.7 MG/DL (ref 8.7–10.5)
CHLORIDE SERPL-SCNC: 94 MMOL/L (ref 95–110)
CO2 SERPL-SCNC: 24 MMOL/L (ref 23–29)
CREAT SERPL-MCNC: 1.1 MG/DL (ref 0.5–1.4)
DIFFERENTIAL METHOD: NORMAL
EOSINOPHIL # BLD AUTO: 0.2 K/UL (ref 0–0.5)
EOSINOPHIL NFR BLD: 2.7 % (ref 0–8)
ERYTHROCYTE [DISTWIDTH] IN BLOOD BY AUTOMATED COUNT: 13.3 % (ref 11.5–14.5)
EST. GFR  (AFRICAN AMERICAN): 56.8 ML/MIN/1.73 M^2
EST. GFR  (NON AFRICAN AMERICAN): 49.2 ML/MIN/1.73 M^2
GLUCOSE SERPL-MCNC: 94 MG/DL (ref 70–110)
HCT VFR BLD AUTO: 38.1 % (ref 37–48.5)
HGB BLD-MCNC: 12.2 G/DL (ref 12–16)
LYMPHOCYTES # BLD AUTO: 1.6 K/UL (ref 1–4.8)
LYMPHOCYTES NFR BLD: 27.7 % (ref 18–48)
MCH RBC QN AUTO: 29.1 PG (ref 27–31)
MCHC RBC AUTO-ENTMCNC: 32 G/DL (ref 32–36)
MCV RBC AUTO: 91 FL (ref 82–98)
MONOCYTES # BLD AUTO: 0.4 K/UL (ref 0.3–1)
MONOCYTES NFR BLD: 6.8 % (ref 4–15)
NEUTROPHILS # BLD AUTO: 3.6 K/UL (ref 1.8–7.7)
NEUTROPHILS NFR BLD: 62 % (ref 38–73)
NRBC BLD-RTO: 0 /100 WBC
PLATELET # BLD AUTO: 292 K/UL (ref 150–350)
PMV BLD AUTO: 9.6 FL (ref 9.2–12.9)
POTASSIUM SERPL-SCNC: 5.2 MMOL/L (ref 3.5–5.1)
PROT SERPL-MCNC: 7.4 G/DL (ref 6–8.4)
RBC # BLD AUTO: 4.19 M/UL (ref 4–5.4)
SODIUM SERPL-SCNC: 127 MMOL/L (ref 136–145)
WBC # BLD AUTO: 5.88 K/UL (ref 3.9–12.7)

## 2020-01-08 PROCEDURE — 80053 COMPREHEN METABOLIC PANEL: CPT

## 2020-01-08 PROCEDURE — 36415 COLL VENOUS BLD VENIPUNCTURE: CPT | Mod: PO

## 2020-01-08 PROCEDURE — 85025 COMPLETE CBC W/AUTO DIFF WBC: CPT

## 2020-01-08 NOTE — PROGRESS NOTES
Chief Complaint :  Lung cancer    Hx of Present illness :  Patient is a 74 y.o. year old female who presents to the clinic today for  Oncology followup.. Initially had sx of bronchitis.  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 Patient has undergone Wedge resection.  Lymph nodes were negative for mets. Was having sx of back pain.  MRI   Spine showed small indeterminate focus of uptake in L2.  On prolia therapy for osteopenia.     Allergies :    Review of patient's allergies indicates:   Allergen Reactions    Lunesta [eszopiclone] Other (See Comments)     Sleep walking       Occupation :  Cleaning Houses    Transfusion :  None    Menstrual & obstetric Hx :   3; para 3.  Age of menarche: 15  Age of first pregnancy: 20  Lactation history: No  Age of menopause:  Hysterectomy at age 31  HRT:  Short time    Present Meds :   Medication List with Changes/Refills   Current Medications    ASPIRIN (ECOTRIN) 81 MG EC TABLET    Take 1 tablet (81 mg total) by mouth once daily.    LOSARTAN-HYDROCHLOROTHIAZIDE 100-25 MG (HYZAAR) 100-25 MG PER TABLET    Take 1 tablet by mouth once daily.    METOPROLOL SUCCINATE (TOPROL-XL) 50 MG 24 HR TABLET    Take 1 tablet (50 mg total) by mouth once daily.    ROSUVASTATIN (CRESTOR) 20 MG TABLET    Take 1 tablet (20 mg total) by mouth once daily.       Past Medical Hx :  No hx of DVT or PE..  No hx of DM, PUD, Hepatitis, Yhyroid dysfunction, CVA , seizure disorder.    Past Medical Hx :  Past Medical History:   Diagnosis Date    Arthritis     neck and back    COPD (chronic obstructive pulmonary disease)     Malignant hypertension     Non-small cell lung cancer 2018    Osteoporosis, postmenopausal     Tobacco use disorder        Travel Hx :  N/A    Immunization :  Immunization History   Administered Date(s) Administered    Pneumococcal Conjugate - 13 Valent 2016    Pneumococcal Polysaccharide - 23  Pee 10/14/2013       Family Hx :  Family History   Problem Relation Age of Onset    Heart disease Mother     Diabetes Mother     Stroke Father     Heart disease Sister     Anesthesia problems Neg Hx        Social Hx :  Social History     Socioeconomic History    Marital status:      Spouse name: Not on file    Number of children: Not on file    Years of education: Not on file    Highest education level: Not on file   Occupational History    Occupation: Retired   Social Needs    Financial resource strain: Not on file    Food insecurity:     Worry: Not on file     Inability: Not on file    Transportation needs:     Medical: Not on file     Non-medical: Not on file   Tobacco Use    Smoking status: Former Smoker     Packs/day: 0.15     Years: 60.00     Pack years: 9.00     Types: Cigarettes    Smokeless tobacco: Never Used    Tobacco comment: quit x 2-3 wks. ago   Substance and Sexual Activity    Alcohol use: Yes     Comment: socially    Drug use: No    Sexual activity: Never     Partners: Male     Comment:    Lifestyle    Physical activity:     Days per week: Not on file     Minutes per session: Not on file    Stress: Not on file   Relationships    Social connections:     Talks on phone: Not on file     Gets together: Not on file     Attends Yazidi service: Not on file     Active member of club or organization: Not on file     Attends meetings of clubs or organizations: Not on file     Relationship status: Not on file   Other Topics Concern    Not on file   Social History Narrative    Not on file       Surgery : C.Section x 3; Hysterectomy ; Bilateral cataract surgery.  Colonoscopy 2018.     Symptoms :    Review of Systems   Constitutional: Positive for malaise/fatigue. Negative for chills, diaphoresis, fever and weight loss (fluctuates.).   HENT: Negative for congestion, hearing loss, nosebleeds, sore throat and tinnitus.    Eyes: Negative for blurred vision, double vision and  photophobia.   Respiratory: Positive for cough and shortness of breath.    Cardiovascular: Negative for chest pain, palpitations, orthopnea, claudication and leg swelling.   Gastrointestinal: Negative for abdominal pain, blood in stool, constipation, diarrhea, heartburn, nausea and vomiting.   Genitourinary: Negative for dysuria, flank pain, frequency, hematuria and urgency.   Musculoskeletal: Positive for back pain. Negative for falls, joint pain, myalgias and neck pain.   Skin: Negative for itching and rash.   Neurological: Negative for dizziness, tingling, tremors, sensory change and headaches.   Endo/Heme/Allergies: Negative for environmental allergies. Does not bruise/bleed easily.   Psychiatric/Behavioral: Negative for depression. The patient is not nervous/anxious and does not have insomnia.        Physical Exam :   present.   Physical Exam   Constitutional: She is oriented to person, place, and time and well-developed, well-nourished, and in no distress. Vital signs are normal. No distress.   HENT:   Head: Normocephalic and atraumatic.   Right Ear: External ear normal.   Left Ear: External ear normal.   Nose: Nose normal.   Mouth/Throat: Oropharynx is clear and moist. No oropharyngeal exudate.   Eyes: Conjunctivae, EOM and lids are normal. Lids are everted and swept, no foreign bodies found. Right eye exhibits no discharge. Left eye exhibits no discharge. No scleral icterus.   Neck: Trachea normal, normal range of motion, full passive range of motion without pain and phonation normal. Neck supple. Normal carotid pulses, no hepatojugular reflux and no JVD present. No tracheal tenderness present. Carotid bruit is not present. No tracheal deviation present. No thyroid mass and no thyromegaly present.   Cardiovascular: Normal rate, regular rhythm, normal heart sounds, intact distal pulses and normal pulses. PMI is not displaced. Exam reveals no gallop and no friction rub.   No murmur heard.  Pulmonary/Chest:  Effort normal and breath sounds normal. No stridor. No apnea. No respiratory distress. She has no wheezes. She has no rales. She exhibits no tenderness.   Abdominal: Soft. Normal appearance. She exhibits no distension, no ascites and no mass. There is no tenderness. There is no rebound, no guarding and no CVA tenderness. No hernia.   Musculoskeletal: Normal range of motion. She exhibits no edema, tenderness or deformity.   Lymphadenopathy:        Head (right side): No submental, no submandibular, no tonsillar, no preauricular, no posterior auricular and no occipital adenopathy present.        Head (left side): No submental, no submandibular, no tonsillar, no preauricular, no posterior auricular and no occipital adenopathy present.     She has no cervical adenopathy.     She has no axillary adenopathy.        Right: No inguinal, no supraclavicular and no epitrochlear adenopathy present.        Left: No inguinal, no supraclavicular and no epitrochlear adenopathy present.   Neurological: She is alert and oriented to person, place, and time. She has normal sensation, normal strength, normal reflexes and intact cranial nerves. No cranial nerve deficit. She exhibits normal muscle tone. Gait normal. Coordination normal. GCS score is 15.   Skin: Skin is warm, dry and intact. No rash noted. She is not diaphoretic. No cyanosis or erythema. No pallor. Nails show no clubbing.   Psychiatric: Mood, memory, affect and judgment normal.   Nursing note and vitals reviewed.    Labs & Imaging :  01/08/20 : Na 127; K 5.2; Cl 94; cr. 1.1; ca 9.7; Bili 0.2. . eGFR 56.8  Hgb 12.2; Hct 38.1. 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  PTis,pN0.    Assessment & Plan:  Reviewed with patient .   Get CT Chest because of persistent Cough. .. Continue prolia. RTC 2 weeks.

## 2020-01-09 ENCOUNTER — OFFICE VISIT (OUTPATIENT)
Dept: HEMATOLOGY/ONCOLOGY | Facility: CLINIC | Age: 75
End: 2020-01-09
Payer: MEDICARE

## 2020-01-09 VITALS
TEMPERATURE: 99 F | WEIGHT: 86 LBS | OXYGEN SATURATION: 99 % | HEART RATE: 70 BPM | BODY MASS INDEX: 16.88 KG/M2 | DIASTOLIC BLOOD PRESSURE: 60 MMHG | SYSTOLIC BLOOD PRESSURE: 137 MMHG | HEIGHT: 60 IN

## 2020-01-09 DIAGNOSIS — J44.9 CHRONIC OBSTRUCTIVE PULMONARY DISEASE, UNSPECIFIED COPD TYPE: ICD-10-CM

## 2020-01-09 DIAGNOSIS — C34.91 MALIGNANT NEOPLASM OF RIGHT LUNG, UNSPECIFIED PART OF LUNG: Primary | ICD-10-CM

## 2020-01-09 DIAGNOSIS — N18.30 CKD (CHRONIC KIDNEY DISEASE) STAGE 3, GFR 30-59 ML/MIN: ICD-10-CM

## 2020-01-09 PROCEDURE — 1159F MED LIST DOCD IN RCRD: CPT | Mod: S$GLB,,, | Performed by: INTERNAL MEDICINE

## 2020-01-09 PROCEDURE — 3075F SYST BP GE 130 - 139MM HG: CPT | Mod: CPTII,S$GLB,, | Performed by: INTERNAL MEDICINE

## 2020-01-09 PROCEDURE — 1101F PR PT FALLS ASSESS DOC 0-1 FALLS W/OUT INJ PAST YR: ICD-10-PCS | Mod: CPTII,S$GLB,, | Performed by: INTERNAL MEDICINE

## 2020-01-09 PROCEDURE — 3075F PR MOST RECENT SYSTOLIC BLOOD PRESS GE 130-139MM HG: ICD-10-PCS | Mod: CPTII,S$GLB,, | Performed by: INTERNAL MEDICINE

## 2020-01-09 PROCEDURE — 1101F PT FALLS ASSESS-DOCD LE1/YR: CPT | Mod: CPTII,S$GLB,, | Performed by: INTERNAL MEDICINE

## 2020-01-09 PROCEDURE — 99999 PR PBB SHADOW E&M-EST. PATIENT-LVL III: ICD-10-PCS | Mod: PBBFAC,,, | Performed by: INTERNAL MEDICINE

## 2020-01-09 PROCEDURE — 1159F PR MEDICATION LIST DOCUMENTED IN MEDICAL RECORD: ICD-10-PCS | Mod: S$GLB,,, | Performed by: INTERNAL MEDICINE

## 2020-01-09 PROCEDURE — 99213 PR OFFICE/OUTPT VISIT, EST, LEVL III, 20-29 MIN: ICD-10-PCS | Mod: S$GLB,,, | Performed by: INTERNAL MEDICINE

## 2020-01-09 PROCEDURE — 3078F PR MOST RECENT DIASTOLIC BLOOD PRESSURE < 80 MM HG: ICD-10-PCS | Mod: CPTII,S$GLB,, | Performed by: INTERNAL MEDICINE

## 2020-01-09 PROCEDURE — 1126F AMNT PAIN NOTED NONE PRSNT: CPT | Mod: S$GLB,,, | Performed by: INTERNAL MEDICINE

## 2020-01-09 PROCEDURE — 99213 OFFICE O/P EST LOW 20 MIN: CPT | Mod: S$GLB,,, | Performed by: INTERNAL MEDICINE

## 2020-01-09 PROCEDURE — 1126F PR PAIN SEVERITY QUANTIFIED, NO PAIN PRESENT: ICD-10-PCS | Mod: S$GLB,,, | Performed by: INTERNAL MEDICINE

## 2020-01-09 PROCEDURE — 99999 PR PBB SHADOW E&M-EST. PATIENT-LVL III: CPT | Mod: PBBFAC,,, | Performed by: INTERNAL MEDICINE

## 2020-01-09 PROCEDURE — 3078F DIAST BP <80 MM HG: CPT | Mod: CPTII,S$GLB,, | Performed by: INTERNAL MEDICINE

## 2020-01-09 RX ORDER — GABAPENTIN 300 MG/1
300 CAPSULE ORAL 3 TIMES DAILY
COMMUNITY
Start: 2019-12-22 | End: 2020-08-04 | Stop reason: SDUPTHER

## 2020-01-09 RX ORDER — OXYCODONE HYDROCHLORIDE 5 MG/1
TABLET ORAL
COMMUNITY
Start: 2019-12-17 | End: 2020-04-08

## 2020-01-13 ENCOUNTER — INFUSION (OUTPATIENT)
Dept: INFUSION THERAPY | Facility: HOSPITAL | Age: 75
End: 2020-01-13
Attending: INTERNAL MEDICINE
Payer: MEDICARE

## 2020-01-13 VITALS
DIASTOLIC BLOOD PRESSURE: 61 MMHG | TEMPERATURE: 98 F | SYSTOLIC BLOOD PRESSURE: 114 MMHG | HEART RATE: 76 BPM | RESPIRATION RATE: 17 BRPM | OXYGEN SATURATION: 97 %

## 2020-01-13 DIAGNOSIS — M85.88 OSTEOPENIA OF LUMBAR SPINE: Primary | ICD-10-CM

## 2020-01-13 PROCEDURE — 96372 THER/PROPH/DIAG INJ SC/IM: CPT

## 2020-01-13 PROCEDURE — 63600175 PHARM REV CODE 636 W HCPCS: Mod: JG | Performed by: INTERNAL MEDICINE

## 2020-01-13 RX ADMIN — DENOSUMAB 60 MG: 60 INJECTION SUBCUTANEOUS at 12:01

## 2020-01-13 NOTE — PLAN OF CARE
Patient arrived to unit for Prolia injection. Plan of care reviewed, patient agreeable to plan. Patient reported taking ca and vit d, denies dental issues, has dentures. Patient tolerated injection well. VSS. Discharge instructions reviewed, patient instructed to return 7/14/20. Patient ambulated unassisted off unit, in NAD at time of discharge.

## 2020-01-15 ENCOUNTER — HOSPITAL ENCOUNTER (OUTPATIENT)
Dept: RADIOLOGY | Facility: HOSPITAL | Age: 75
Discharge: HOME | End: 2020-01-15
Attending: THORACIC SURGERY (CARDIOTHORACIC VASCULAR SURGERY)
Payer: MEDICARE

## 2020-01-15 DIAGNOSIS — C34.91 SQUAMOUS CELL CARCINOMA OF RIGHT LUNG: ICD-10-CM

## 2020-01-15 PROCEDURE — 71250 CT THORAX DX C-: CPT | Mod: TC

## 2020-01-15 PROCEDURE — 71250 CT CHEST WITHOUT CONTRAST: ICD-10-PCS | Mod: 26,,, | Performed by: RADIOLOGY

## 2020-01-15 PROCEDURE — 71250 CT THORAX DX C-: CPT | Mod: 26,,, | Performed by: RADIOLOGY

## 2020-01-20 ENCOUNTER — TELEPHONE (OUTPATIENT)
Dept: FAMILY MEDICINE | Facility: CLINIC | Age: 75
End: 2020-01-20

## 2020-01-20 NOTE — TELEPHONE ENCOUNTER
----- Message from Katharina James sent at 1/20/2020  9:26 AM CST -----  Contact: self   Type: Patient Call Back    What is the request in detail: Pt returning call. I tried to reschedule appt but pt preferred to speak to a nurse.     Can the clinic reply by [facility]? No    Would the patient rather a call back or a response via [facility]? Call back     Best call back number: [phone]

## 2020-01-22 ENCOUNTER — OFFICE VISIT (OUTPATIENT)
Dept: FAMILY MEDICINE | Facility: CLINIC | Age: 75
End: 2020-01-22
Payer: MEDICARE

## 2020-01-22 VITALS
WEIGHT: 83.44 LBS | BODY MASS INDEX: 16.38 KG/M2 | HEIGHT: 60 IN | DIASTOLIC BLOOD PRESSURE: 62 MMHG | SYSTOLIC BLOOD PRESSURE: 118 MMHG | TEMPERATURE: 98 F | OXYGEN SATURATION: 96 % | HEART RATE: 68 BPM

## 2020-01-22 DIAGNOSIS — R53.83 FATIGUE, UNSPECIFIED TYPE: Primary | ICD-10-CM

## 2020-01-22 DIAGNOSIS — R63.4 WEIGHT LOSS, UNINTENTIONAL: ICD-10-CM

## 2020-01-22 DIAGNOSIS — E86.0 DEHYDRATION, MODERATE: ICD-10-CM

## 2020-01-22 LAB
BILIRUB SERPL-MCNC: NORMAL MG/DL
BLOOD URINE, POC: NORMAL
COLOR, POC UA: YELLOW
GLUCOSE SERPL-MCNC: 129 MG/DL (ref 70–110)
GLUCOSE UR QL STRIP: NORMAL
KETONES UR QL STRIP: NORMAL
LEUKOCYTE ESTERASE URINE, POC: NORMAL
NITRITE, POC UA: NORMAL
PH, POC UA: 5
PROTEIN, POC: NORMAL
SPECIFIC GRAVITY, POC UA: 1.02
UROBILINOGEN, POC UA: NORMAL

## 2020-01-22 PROCEDURE — 3074F SYST BP LT 130 MM HG: CPT | Mod: CPTII,S$GLB,, | Performed by: NURSE PRACTITIONER

## 2020-01-22 PROCEDURE — 3074F PR MOST RECENT SYSTOLIC BLOOD PRESSURE < 130 MM HG: ICD-10-PCS | Mod: CPTII,S$GLB,, | Performed by: NURSE PRACTITIONER

## 2020-01-22 PROCEDURE — 1159F PR MEDICATION LIST DOCUMENTED IN MEDICAL RECORD: ICD-10-PCS | Mod: S$GLB,,, | Performed by: NURSE PRACTITIONER

## 2020-01-22 PROCEDURE — 99214 OFFICE O/P EST MOD 30 MIN: CPT | Mod: 25,S$GLB,, | Performed by: NURSE PRACTITIONER

## 2020-01-22 PROCEDURE — 81002 POCT URINE DIPSTICK WITHOUT MICROSCOPE: ICD-10-PCS | Mod: S$GLB,,, | Performed by: NURSE PRACTITIONER

## 2020-01-22 PROCEDURE — 82962 POCT GLUCOSE, HAND-HELD DEVICE: ICD-10-PCS | Mod: S$GLB,,, | Performed by: NURSE PRACTITIONER

## 2020-01-22 PROCEDURE — 81002 URINALYSIS NONAUTO W/O SCOPE: CPT | Mod: S$GLB,,, | Performed by: NURSE PRACTITIONER

## 2020-01-22 PROCEDURE — 99999 PR PBB SHADOW E&M-EST. PATIENT-LVL IV: CPT | Mod: PBBFAC,,, | Performed by: NURSE PRACTITIONER

## 2020-01-22 PROCEDURE — 3078F DIAST BP <80 MM HG: CPT | Mod: CPTII,S$GLB,, | Performed by: NURSE PRACTITIONER

## 2020-01-22 PROCEDURE — 82962 GLUCOSE BLOOD TEST: CPT | Mod: S$GLB,,, | Performed by: NURSE PRACTITIONER

## 2020-01-22 PROCEDURE — 3078F PR MOST RECENT DIASTOLIC BLOOD PRESSURE < 80 MM HG: ICD-10-PCS | Mod: CPTII,S$GLB,, | Performed by: NURSE PRACTITIONER

## 2020-01-22 PROCEDURE — 1126F PR PAIN SEVERITY QUANTIFIED, NO PAIN PRESENT: ICD-10-PCS | Mod: S$GLB,,, | Performed by: NURSE PRACTITIONER

## 2020-01-22 PROCEDURE — 99214 PR OFFICE/OUTPT VISIT, EST, LEVL IV, 30-39 MIN: ICD-10-PCS | Mod: 25,S$GLB,, | Performed by: NURSE PRACTITIONER

## 2020-01-22 PROCEDURE — 1159F MED LIST DOCD IN RCRD: CPT | Mod: S$GLB,,, | Performed by: NURSE PRACTITIONER

## 2020-01-22 PROCEDURE — 1101F PR PT FALLS ASSESS DOC 0-1 FALLS W/OUT INJ PAST YR: ICD-10-PCS | Mod: CPTII,S$GLB,, | Performed by: NURSE PRACTITIONER

## 2020-01-22 PROCEDURE — 1126F AMNT PAIN NOTED NONE PRSNT: CPT | Mod: S$GLB,,, | Performed by: NURSE PRACTITIONER

## 2020-01-22 PROCEDURE — 99999 PR PBB SHADOW E&M-EST. PATIENT-LVL IV: ICD-10-PCS | Mod: PBBFAC,,, | Performed by: NURSE PRACTITIONER

## 2020-01-22 PROCEDURE — 1101F PT FALLS ASSESS-DOCD LE1/YR: CPT | Mod: CPTII,S$GLB,, | Performed by: NURSE PRACTITIONER

## 2020-01-22 RX ORDER — SODIUM CHLORIDE 9 MG/ML
INJECTION, SOLUTION INTRAVENOUS
Status: COMPLETED | OUTPATIENT
Start: 2020-01-22 | End: 2020-01-22

## 2020-01-22 RX ADMIN — SODIUM CHLORIDE: 9 INJECTION, SOLUTION INTRAVENOUS at 03:01

## 2020-01-22 NOTE — PROGRESS NOTES
Subjective:       Patient ID: Ariadna Villegas is a 74 y.o. female.    Chief Complaint: Fatigue (loss weight)    Fatigue   This is a new problem. The current episode started in the past 7 days. The problem occurs constantly. The problem has been unchanged. Associated symptoms include abdominal pain, fatigue, myalgias, nausea, vomiting and weakness (generalized). Pertinent negatives include no anorexia, arthralgias, change in bowel habit, chest pain, chills, congestion, coughing, diaphoresis, fever, headaches, joint swelling, neck pain, numbness, rash, sore throat, swollen glands, urinary symptoms, vertigo or visual change. The symptoms are aggravated by eating and drinking. She has tried nothing for the symptoms.     Review of Systems   Constitutional: Positive for appetite change, fatigue and unexpected weight change. Negative for chills, diaphoresis and fever.   HENT: Negative for congestion, postnasal drip, rhinorrhea, sinus pressure, sinus pain, sneezing and sore throat.    Respiratory: Negative for cough.    Cardiovascular: Negative for chest pain.   Gastrointestinal: Positive for abdominal pain, nausea and vomiting. Negative for anorexia and change in bowel habit.   Musculoskeletal: Positive for myalgias. Negative for arthralgias, joint swelling and neck pain.   Skin: Negative for rash.   Neurological: Positive for weakness (generalized). Negative for vertigo, numbness and headaches.       Objective:      Physical Exam   Constitutional: She is oriented to person, place, and time. Vital signs are normal. She appears well-developed and well-nourished.   HENT:   Head: Normocephalic and atraumatic.   Right Ear: External ear normal.   Left Ear: External ear normal.   Nose: Nose normal.   Mouth/Throat: Oropharynx is clear and moist. No oropharyngeal exudate.       Cardiovascular: Normal rate, regular rhythm and normal heart sounds.   Pulmonary/Chest: Effort normal and breath sounds normal.   Neurological: She is  alert and oriented to person, place, and time.   Skin: Skin is warm, dry and intact. Capillary refill takes 2 to 3 seconds.   Psychiatric: She has a normal mood and affect.       Assessment:       1. Fatigue, unspecified type    2. Weight loss, unintentional    3. Dehydration, moderate        Plan:       Ariadna was seen today for fatigue.    Diagnoses and all orders for this visit:    Fatigue, unspecified type  -     POCT URINE DIPSTICK WITHOUT MICROSCOPE  -     POCT Glucose, Hand-Held Device    Weight loss, unintentional  -     POCT URINE DIPSTICK WITHOUT MICROSCOPE  -     POCT Glucose, Hand-Held Device    Dehydration, moderate  -     0.9%  NaCl infusion  spent 25 minutes with the patient of which more than half was spent in direct consultation with the patient in regards to our treatment and plan.   Education. In office Bolus was given. States feels better.     Home care  · Drink at least 12 8-ounce glasses of fluid every day to resolve the dehydration. Fluid may include water; orange juice; lemonade; apple, grape, or cranberry juice; clear fruit drinks; electrolyte replacement and sports drinks; and teas and coffee without caffeine. If you have been diagnosed with a kidney disease, ask your doctor how much and what types of fluids you should drink to prevent dehydration. If you have kidney disease, fluid can build up in the body. This can be dangerous to your health.  · If you have a fever, muscle aches, or a headache as a result of a cold or flu, you may take acetaminophen or ibuprofen, unless another medicine was prescribed. If you have chronic liver or kidney disease, or have ever had a stomach ulcer or gastrointestinal bleeding, talk with your health care provider before using these medicines. Don't take aspirin if you are younger than 18 and have a fever. Aspirin raises the chance for severe liver injury.  Follow-up care  Follow up with your health care provider, or as advised.  When to seek medical  advice  Call your health care provider right away if any of these occur:  · Continued vomiting  · Frequent diarrhea (more than 5 times a day); blood (red or black color) or mucus in diarrhea  · Blood in vomit or stool  · Swollen abdomen or increasing abdominal pain  · Weakness, dizziness, or fainting  · Unusual drowsiness or confusion  · Reduced urine output or extreme thirst  · Fever of 100.4°F (34°C) or higher  Date Last Reviewed: 5/31/2015 © 2000-2017 [employer]. [address] [place] [address], [place] [address]. All rights reserved. This information is not intended as a substitute for professional medical care. Always follow your healthcare professional's instructions.

## 2020-01-22 NOTE — PROGRESS NOTES
Patient tolerated IV Therapy well.  Instructed to remain in room for re evaluation by CHARISSA Zuniga 15 minutes and to report any adverse reactions right away.  Verbalized understanding.

## 2020-01-22 NOTE — PATIENT INSTRUCTIONS
Dehydration (Adult)  Dehydration occurs when your body loses too much fluid. This may be the result of prolonged vomiting or diarrhea, excessive sweating, or a high fever. It may also happen if you dont drink enough fluid when youre sick or out in the heat. Misuse of diuretics (water pills) can also be a cause.  Symptoms include thirst and decreased urine output. You may also feel dizzy, weak, fatigued, or very drowsy. The diet described below is usually enough to treat dehydration. In some cases, you may need medicine.  Home care  · Drink at least 12 8-ounce glasses of fluid every day to resolve the dehydration. Fluid may include water; orange juice; lemonade; apple, grape, or cranberry juice; clear fruit drinks; electrolyte replacement and sports drinks; and teas and coffee without caffeine. If you have been diagnosed with a kidney disease, ask your doctor how much and what types of fluids you should drink to prevent dehydration. If you have kidney disease, fluid can build up in the body. This can be dangerous to your health.  · If you have a fever, muscle aches, or a headache as a result of a cold or flu, you may take acetaminophen or ibuprofen, unless another medicine was prescribed. If you have chronic liver or kidney disease, or have ever had a stomach ulcer or gastrointestinal bleeding, talk with your health care provider before using these medicines. Don't take aspirin if you are younger than 18 and have a fever. Aspirin raises the chance for severe liver injury.  Follow-up care  Follow up with your health care provider, or as advised.  When to seek medical advice  Call your health care provider right away if any of these occur:  · Continued vomiting  · Frequent diarrhea (more than 5 times a day); blood (red or black color) or mucus in diarrhea  · Blood in vomit or stool  · Swollen abdomen or increasing abdominal pain  · Weakness, dizziness, or fainting  · Unusual drowsiness or confusion  · Reduced urine  output or extreme thirst  · Fever of 100.4°F (34°C) or higher  Date Last Reviewed: 5/31/2015  © 2024-6154 [employer]. [address] [place] [address], [place] [address]. All rights reserved. This information is not intended as a substitute for professional medical care. Always follow your healthcare professional's instructions.

## 2020-01-28 ENCOUNTER — OFFICE VISIT (OUTPATIENT)
Dept: HEMATOLOGY/ONCOLOGY | Facility: CLINIC | Age: 75
End: 2020-01-28
Payer: MEDICARE

## 2020-01-28 VITALS
BODY MASS INDEX: 16.97 KG/M2 | HEART RATE: 79 BPM | HEIGHT: 60 IN | WEIGHT: 86.44 LBS | DIASTOLIC BLOOD PRESSURE: 68 MMHG | TEMPERATURE: 98 F | SYSTOLIC BLOOD PRESSURE: 126 MMHG | OXYGEN SATURATION: 97 %

## 2020-01-28 DIAGNOSIS — C34.91 MALIGNANT NEOPLASM OF RIGHT LUNG, UNSPECIFIED PART OF LUNG: Primary | ICD-10-CM

## 2020-01-28 DIAGNOSIS — J44.9 CHRONIC OBSTRUCTIVE PULMONARY DISEASE, UNSPECIFIED COPD TYPE: ICD-10-CM

## 2020-01-28 PROCEDURE — 3078F DIAST BP <80 MM HG: CPT | Mod: CPTII,S$GLB,, | Performed by: INTERNAL MEDICINE

## 2020-01-28 PROCEDURE — 3074F PR MOST RECENT SYSTOLIC BLOOD PRESSURE < 130 MM HG: ICD-10-PCS | Mod: CPTII,S$GLB,, | Performed by: INTERNAL MEDICINE

## 2020-01-28 PROCEDURE — 99213 PR OFFICE/OUTPT VISIT, EST, LEVL III, 20-29 MIN: ICD-10-PCS | Mod: S$GLB,,, | Performed by: INTERNAL MEDICINE

## 2020-01-28 PROCEDURE — 1126F AMNT PAIN NOTED NONE PRSNT: CPT | Mod: S$GLB,,, | Performed by: INTERNAL MEDICINE

## 2020-01-28 PROCEDURE — 99213 OFFICE O/P EST LOW 20 MIN: CPT | Mod: S$GLB,,, | Performed by: INTERNAL MEDICINE

## 2020-01-28 PROCEDURE — 1159F MED LIST DOCD IN RCRD: CPT | Mod: S$GLB,,, | Performed by: INTERNAL MEDICINE

## 2020-01-28 PROCEDURE — 1126F PR PAIN SEVERITY QUANTIFIED, NO PAIN PRESENT: ICD-10-PCS | Mod: S$GLB,,, | Performed by: INTERNAL MEDICINE

## 2020-01-28 PROCEDURE — 1159F PR MEDICATION LIST DOCUMENTED IN MEDICAL RECORD: ICD-10-PCS | Mod: S$GLB,,, | Performed by: INTERNAL MEDICINE

## 2020-01-28 PROCEDURE — 1101F PR PT FALLS ASSESS DOC 0-1 FALLS W/OUT INJ PAST YR: ICD-10-PCS | Mod: CPTII,S$GLB,, | Performed by: INTERNAL MEDICINE

## 2020-01-28 PROCEDURE — 3078F PR MOST RECENT DIASTOLIC BLOOD PRESSURE < 80 MM HG: ICD-10-PCS | Mod: CPTII,S$GLB,, | Performed by: INTERNAL MEDICINE

## 2020-01-28 PROCEDURE — 1101F PT FALLS ASSESS-DOCD LE1/YR: CPT | Mod: CPTII,S$GLB,, | Performed by: INTERNAL MEDICINE

## 2020-01-28 PROCEDURE — 3074F SYST BP LT 130 MM HG: CPT | Mod: CPTII,S$GLB,, | Performed by: INTERNAL MEDICINE

## 2020-01-28 PROCEDURE — 99999 PR PBB SHADOW E&M-EST. PATIENT-LVL III: ICD-10-PCS | Mod: PBBFAC,,, | Performed by: INTERNAL MEDICINE

## 2020-01-28 PROCEDURE — 99999 PR PBB SHADOW E&M-EST. PATIENT-LVL III: CPT | Mod: PBBFAC,,, | Performed by: INTERNAL MEDICINE

## 2020-01-28 NOTE — PROGRESS NOTES
Chief Complaint :  Lung cancer    Hx of Present illness :  Patient is a 74 y.o. year old female who presents to the clinic today for  Oncology followup.. Initially had sx of bronchitis.  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 Patient has undergone Wedge resection.  Lymph nodes were negative for mets. Was having sx of back pain.  MRI   Spine showed small indeterminate focus of uptake in L2.  On prolia therapy for osteopenia.     Allergies :    Review of patient's allergies indicates:   Allergen Reactions    Lunesta [eszopiclone] Other (See Comments)     Sleep walking       Occupation :  Cleaning Houses    Transfusion :  None    Menstrual & obstetric Hx :   3; para 3.  Age of menarche: 15  Age of first pregnancy: 20  Lactation history: No  Age of menopause:  Hysterectomy at age 31  HRT:  Short time    Present Meds :   Medication List with Changes/Refills   Current Medications    ASPIRIN (ECOTRIN) 81 MG EC TABLET    Take 1 tablet (81 mg total) by mouth once daily.    GABAPENTIN (NEURONTIN) 300 MG CAPSULE        LOSARTAN-HYDROCHLOROTHIAZIDE 100-25 MG (HYZAAR) 100-25 MG PER TABLET    Take 1 tablet by mouth once daily.    METOPROLOL SUCCINATE (TOPROL-XL) 50 MG 24 HR TABLET    Take 1 tablet (50 mg total) by mouth once daily.    OXYCODONE (ROXICODONE) 5 MG IMMEDIATE RELEASE TABLET    TK 1 T PO Q 8 H PRN    ROSUVASTATIN (CRESTOR) 20 MG TABLET    Take 1 tablet (20 mg total) by mouth once daily.       Past Medical Hx :  No hx of DVT or PE..  No hx of DM, PUD, Hepatitis, Yhyroid dysfunction, CVA , seizure disorder.    Past Medical Hx :  Past Medical History:   Diagnosis Date    Arthritis     neck and back    COPD (chronic obstructive pulmonary disease)     Malignant hypertension     Non-small cell lung cancer 2018    Osteoporosis, postmenopausal     Tobacco use disorder        Travel Hx :  N/A    Immunization :  Immunization History    Administered Date(s) Administered    Pneumococcal Conjugate - 13 Valent 11/14/2016    Pneumococcal Polysaccharide - 23 Valent 10/14/2013       Family Hx :  Family History   Problem Relation Age of Onset    Heart disease Mother     Diabetes Mother     Stroke Father     Heart disease Sister     Anesthesia problems Neg Hx        Social Hx :  Social History     Socioeconomic History    Marital status:      Spouse name: Not on file    Number of children: Not on file    Years of education: Not on file    Highest education level: Not on file   Occupational History    Occupation: Retired   Social Needs    Financial resource strain: Not on file    Food insecurity:     Worry: Not on file     Inability: Not on file    Transportation needs:     Medical: Not on file     Non-medical: Not on file   Tobacco Use    Smoking status: Former Smoker     Packs/day: 0.15     Years: 60.00     Pack years: 9.00     Types: Cigarettes    Smokeless tobacco: Never Used    Tobacco comment: quit x 2-3 wks. ago   Substance and Sexual Activity    Alcohol use: Yes     Comment: socially    Drug use: No    Sexual activity: Never     Partners: Male     Comment:    Lifestyle    Physical activity:     Days per week: Not on file     Minutes per session: Not on file    Stress: Not on file   Relationships    Social connections:     Talks on phone: Not on file     Gets together: Not on file     Attends Anabaptism service: Not on file     Active member of club or organization: Not on file     Attends meetings of clubs or organizations: Not on file     Relationship status: Not on file   Other Topics Concern    Not on file   Social History Narrative    Not on file       Surgery : C.Section x 3; Hysterectomy ; Bilateral cataract surgery.  Colonoscopy 2018.     Symptoms :    Review of Systems   Constitutional: Positive for malaise/fatigue. Negative for chills, diaphoresis, fever and weight loss (fluctuates.).   HENT: Negative  for congestion, hearing loss, nosebleeds, sore throat and tinnitus.    Eyes: Negative for blurred vision, double vision and photophobia.   Respiratory: Positive for cough and shortness of breath.    Cardiovascular: Negative for chest pain, palpitations, orthopnea, claudication and leg swelling.   Gastrointestinal: Negative for abdominal pain, blood in stool, constipation, diarrhea, heartburn, nausea and vomiting.   Genitourinary: Negative for dysuria, flank pain, frequency, hematuria and urgency.   Musculoskeletal: Positive for back pain. Negative for falls, joint pain, myalgias and neck pain.   Skin: Negative for itching and rash.   Neurological: Negative for dizziness, tingling, tremors, sensory change and headaches.   Endo/Heme/Allergies: Negative for environmental allergies. Does not bruise/bleed easily.   Psychiatric/Behavioral: Negative for depression. The patient is not nervous/anxious and does not have insomnia.        Physical Exam :   present.   Physical Exam   Constitutional: She is oriented to person, place, and time and well-developed, well-nourished, and in no distress. Vital signs are normal. No distress.   HENT:   Head: Normocephalic and atraumatic.   Right Ear: External ear normal.   Left Ear: External ear normal.   Nose: Nose normal.   Mouth/Throat: Oropharynx is clear and moist. No oropharyngeal exudate.   Eyes: Conjunctivae, EOM and lids are normal. Lids are everted and swept, no foreign bodies found. Right eye exhibits no discharge. Left eye exhibits no discharge. No scleral icterus.   Neck: Trachea normal, normal range of motion, full passive range of motion without pain and phonation normal. Neck supple. Normal carotid pulses, no hepatojugular reflux and no JVD present. No tracheal tenderness present. Carotid bruit is not present. No tracheal deviation present. No thyroid mass and no thyromegaly present.   Cardiovascular: Normal rate, regular rhythm, normal heart sounds, intact distal pulses  and normal pulses. PMI is not displaced. Exam reveals no gallop and no friction rub.   No murmur heard.  Pulmonary/Chest: Effort normal and breath sounds normal. No stridor. No apnea. No respiratory distress. She has no wheezes. She has no rales. She exhibits no tenderness.   Abdominal: Soft. Normal appearance. She exhibits no distension, no ascites and no mass. There is no tenderness. There is no rebound, no guarding and no CVA tenderness. No hernia.   Musculoskeletal: Normal range of motion. She exhibits no edema, tenderness or deformity.   Lymphadenopathy:        Head (right side): No submental, no submandibular, no tonsillar, no preauricular, no posterior auricular and no occipital adenopathy present.        Head (left side): No submental, no submandibular, no tonsillar, no preauricular, no posterior auricular and no occipital adenopathy present.     She has no cervical adenopathy.     She has no axillary adenopathy.        Right: No inguinal, no supraclavicular and no epitrochlear adenopathy present.        Left: No inguinal, no supraclavicular and no epitrochlear adenopathy present.   Neurological: She is alert and oriented to person, place, and time. She has normal sensation, normal strength, normal reflexes and intact cranial nerves. No cranial nerve deficit. She exhibits normal muscle tone. Gait normal. Coordination normal. GCS score is 15.   Skin: Skin is warm, dry and intact. No rash noted. She is not diaphoretic. No cyanosis or erythema. No pallor. Nails show no clubbing.   Psychiatric: Mood, memory, affect and judgment normal.   Nursing note and vitals reviewed.    Labs & Imaging :  01/08/20 : Na 127; K 5.2; Cl 94; cr. 1.1; ca 9.7; Bili 0.2. . eGFR 56.8  Hgb 12.2; Hct 38.1. MCV 91; Platelets 292,000 ANC 3,600  01/15/20 : CT Chest : no mass, adenopathy or effusion.    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  PTis,pN0.    Assessment & Plan:  Reviewed with patient .   Followup  with  for cough.  .. Continue prolia.  Oncology followup in 4 months

## 2020-03-06 ENCOUNTER — OFFICE VISIT (OUTPATIENT)
Dept: FAMILY MEDICINE | Facility: CLINIC | Age: 75
End: 2020-03-06
Payer: MEDICARE

## 2020-03-06 VITALS
SYSTOLIC BLOOD PRESSURE: 100 MMHG | TEMPERATURE: 98 F | WEIGHT: 85.75 LBS | HEART RATE: 85 BPM | DIASTOLIC BLOOD PRESSURE: 60 MMHG | BODY MASS INDEX: 16.84 KG/M2 | HEIGHT: 60 IN | OXYGEN SATURATION: 95 %

## 2020-03-06 DIAGNOSIS — J20.9 ACUTE BRONCHITIS, UNSPECIFIED ORGANISM: Primary | ICD-10-CM

## 2020-03-06 DIAGNOSIS — L25.9 CONTACT DERMATITIS, UNSPECIFIED CONTACT DERMATITIS TYPE, UNSPECIFIED TRIGGER: ICD-10-CM

## 2020-03-06 PROCEDURE — 99214 PR OFFICE/OUTPT VISIT, EST, LEVL IV, 30-39 MIN: ICD-10-PCS | Mod: S$GLB,,, | Performed by: NURSE PRACTITIONER

## 2020-03-06 PROCEDURE — 1126F AMNT PAIN NOTED NONE PRSNT: CPT | Mod: S$GLB,,, | Performed by: NURSE PRACTITIONER

## 2020-03-06 PROCEDURE — 3078F PR MOST RECENT DIASTOLIC BLOOD PRESSURE < 80 MM HG: ICD-10-PCS | Mod: CPTII,S$GLB,, | Performed by: NURSE PRACTITIONER

## 2020-03-06 PROCEDURE — 1159F PR MEDICATION LIST DOCUMENTED IN MEDICAL RECORD: ICD-10-PCS | Mod: S$GLB,,, | Performed by: NURSE PRACTITIONER

## 2020-03-06 PROCEDURE — 1159F MED LIST DOCD IN RCRD: CPT | Mod: S$GLB,,, | Performed by: NURSE PRACTITIONER

## 2020-03-06 PROCEDURE — 99214 OFFICE O/P EST MOD 30 MIN: CPT | Mod: S$GLB,,, | Performed by: NURSE PRACTITIONER

## 2020-03-06 PROCEDURE — 99999 PR PBB SHADOW E&M-EST. PATIENT-LVL IV: ICD-10-PCS | Mod: PBBFAC,,, | Performed by: NURSE PRACTITIONER

## 2020-03-06 PROCEDURE — 99999 PR PBB SHADOW E&M-EST. PATIENT-LVL IV: CPT | Mod: PBBFAC,,, | Performed by: NURSE PRACTITIONER

## 2020-03-06 PROCEDURE — 3078F DIAST BP <80 MM HG: CPT | Mod: CPTII,S$GLB,, | Performed by: NURSE PRACTITIONER

## 2020-03-06 PROCEDURE — 3074F SYST BP LT 130 MM HG: CPT | Mod: CPTII,S$GLB,, | Performed by: NURSE PRACTITIONER

## 2020-03-06 PROCEDURE — 1101F PR PT FALLS ASSESS DOC 0-1 FALLS W/OUT INJ PAST YR: ICD-10-PCS | Mod: CPTII,S$GLB,, | Performed by: NURSE PRACTITIONER

## 2020-03-06 PROCEDURE — 1101F PT FALLS ASSESS-DOCD LE1/YR: CPT | Mod: CPTII,S$GLB,, | Performed by: NURSE PRACTITIONER

## 2020-03-06 PROCEDURE — 3074F PR MOST RECENT SYSTOLIC BLOOD PRESSURE < 130 MM HG: ICD-10-PCS | Mod: CPTII,S$GLB,, | Performed by: NURSE PRACTITIONER

## 2020-03-06 PROCEDURE — 1126F PR PAIN SEVERITY QUANTIFIED, NO PAIN PRESENT: ICD-10-PCS | Mod: S$GLB,,, | Performed by: NURSE PRACTITIONER

## 2020-03-06 RX ORDER — AZELASTINE 1 MG/ML
1 SPRAY, METERED NASAL 2 TIMES DAILY
Qty: 30 ML | Refills: 0 | Status: SHIPPED | OUTPATIENT
Start: 2020-03-06 | End: 2020-07-14

## 2020-03-06 RX ORDER — TRIAMCINOLONE ACETONIDE 1 MG/G
CREAM TOPICAL 2 TIMES DAILY
Qty: 45 G | Refills: 1 | Status: ON HOLD | OUTPATIENT
Start: 2020-03-06 | End: 2020-06-25 | Stop reason: HOSPADM

## 2020-03-06 RX ORDER — METHYLPREDNISOLONE 4 MG/1
TABLET ORAL
Qty: 1 PACKAGE | Refills: 0 | Status: SHIPPED | OUTPATIENT
Start: 2020-03-06 | End: 2020-04-08 | Stop reason: SDUPTHER

## 2020-03-06 NOTE — PROGRESS NOTES
Subjective:       Patient ID: Ariadna Villegas is a 75 y.o. female.    Chief Complaint: Cough and Rash    Cough   This is a chronic problem. The current episode started 1 to 4 weeks ago. The problem has been gradually worsening. The problem occurs every few minutes. The cough is non-productive. Associated symptoms include a rash. Pertinent negatives include no chest pain, chills, ear congestion, ear pain, fever, headaches, heartburn, hemoptysis, myalgias, nasal congestion, postnasal drip, rhinorrhea, sore throat, shortness of breath, sweats, weight loss or wheezing. Nothing aggravates the symptoms.   Rash   This is a new problem. The current episode started 1 to 4 weeks ago. The problem has been gradually worsening since onset. The affected locations include the neck. The rash is characterized by itchiness and redness. It is unknown if there was an exposure to a precipitant. Associated symptoms include coughing. Pertinent negatives include no anorexia, congestion, diarrhea, eye pain, facial edema, fatigue, fever, joint pain, rhinorrhea, shortness of breath, sore throat or vomiting. Past treatments include nothing.     Review of Systems   Constitutional: Negative for chills, fatigue, fever and weight loss.   HENT: Negative for congestion, ear pain, postnasal drip, rhinorrhea, sinus pressure, sinus pain, sneezing and sore throat.    Eyes: Negative for pain.   Respiratory: Positive for cough. Negative for hemoptysis, chest tightness, shortness of breath and wheezing.    Cardiovascular: Negative for chest pain.   Gastrointestinal: Negative for anorexia, diarrhea, heartburn and vomiting.   Musculoskeletal: Negative for arthralgias, joint pain, joint swelling and myalgias.   Skin: Positive for rash.   Neurological: Negative for headaches.       Objective:      Physical Exam   Constitutional: She is oriented to person, place, and time. Vital signs are normal. She appears well-developed and well-nourished.   HENT:   Head:  Normocephalic and atraumatic.   Right Ear: External ear normal.   Left Ear: External ear normal.   Nose: Nose normal.   Mouth/Throat: Oropharynx is clear and moist. No oropharyngeal exudate.   Cardiovascular: Normal rate, regular rhythm and normal heart sounds.   Pulmonary/Chest: Effort normal and breath sounds normal.   Neurological: She is alert and oriented to person, place, and time.   Skin: Skin is warm, dry and intact. Capillary refill takes less than 2 seconds. Rash noted. Rash is maculopapular.   Psychiatric: She has a normal mood and affect.       Assessment:       1. Acute bronchitis, unspecified organism    2. Contact dermatitis, unspecified contact dermatitis type, unspecified trigger        Plan:       Ariadna was seen today for cough and rash.    Diagnoses and all orders for this visit:    Acute bronchitis, unspecified organism  -     methylPREDNISolone (MEDROL DOSEPACK) 4 mg tablet; use as directed  -     azelastine (ASTELIN) 137 mcg (0.1 %) nasal spray; 1 spray (137 mcg total) by Nasal route 2 (two) times daily.  HOME CARE:  If symptoms are severe, rest at home for the first 2-3 days. When you resume activity, don't let yourself get too tired.  Do not smoke. Avoid being exposed to the smoke of others.  You may use acetaminophen (Tylenol) or ibuprofen (Motrin, Advil) to control fever or pain, unless another medicine was prescribed for this. [NOTE: If you have chronic liver or kidney disease or ever had a stomach ulcer or GI bleeding, talk with your doctor before using these medicines.]  Your appetite may be poor, so a light diet is fine. Avoid dehydration by drinking 6-8 glasses of fluids per day (water, soft, drinks, juices, tea, soup, etc.). Extra fluids will help loosen secretions in the lungs.  Over-the-counter cough medicines that contain dextromethorphan (such as Robitussin DM) and decongestants (Actifed or Sudafed) may help relieve cough and congestion. [NOTE: Do not use decongestants if you  have high blood pressure.]  Finish all antibiotic medicine, even if you are feeling better after only a few days.  FOLLOW UP with your doctor or as directed if you dont start to feel better after three days.  [NOTE: If you are age 65 or older, or if you have chronic asthma or COPD, we recommend a PNEUMOCOCCAL VACCINATION every five years and a yearly INFLUENZAVACCINATION (FLU-SHOT) every autumn. Ask your doctor about this. If you had an X-ray, a radiologist will review it. You will be notified of any new findings that may affect your care.]  GET PROMPT MEDICAL ATTENTION if any of the following occur:  Fever over 100.4°F (38.0°C) for more than three days  Trouble breathing, wheezing or pain with breathing  Coughing up blood or increased amounts of colored sputum  Weakness, drowsiness, headache, facial pain, ear pain or a stiff neck  © 6565-0016 [place] [address]. All rights reserved. This information is not intended as a substitute for professional medical care. Always follow your healthcare professional's instructions.      Contact dermatitis, unspecified contact dermatitis type, unspecified trigger  -     methylPREDNISolone (MEDROL DOSEPACK) 4 mg tablet; use as directed  -     triamcinolone acetonide 0.1% (KENALOG) 0.1 % cream; Apply topically 2 (two) times daily.

## 2020-03-06 NOTE — PATIENT INSTRUCTIONS
HOME CARE:  If symptoms are severe, rest at home for the first 2-3 days. When you resume activity, don't let yourself get too tired.  Do not smoke. Avoid being exposed to the smoke of others.  You may use acetaminophen (Tylenol) or ibuprofen (Motrin, Advil) to control fever or pain, unless another medicine was prescribed for this. [NOTE: If you have chronic liver or kidney disease or ever had a stomach ulcer or GI bleeding, talk with your doctor before using these medicines.]  Your appetite may be poor, so a light diet is fine. Avoid dehydration by drinking 6-8 glasses of fluids per day (water, soft, drinks, juices, tea, soup, etc.). Extra fluids will help loosen secretions in the lungs.  Over-the-counter cough medicines that contain dextromethorphan (such as Robitussin DM) and decongestants (Actifed or Sudafed) may help relieve cough and congestion. [NOTE: Do not use decongestants if you have high blood pressure.]  Finish all antibiotic medicine, even if you are feeling better after only a few days.  FOLLOW UP with your doctor or as directed if you dont start to feel better after three days.  [NOTE: If you are age 65 or older, or if you have chronic asthma or COPD, we recommend a PNEUMOCOCCAL VACCINATION every five years and a yearly INFLUENZAVACCINATION (FLU-SHOT) every autumn. Ask your doctor about this. If you had an X-ray, a radiologist will review it. You will be notified of any new findings that may affect your care.]  GET PROMPT MEDICAL ATTENTION if any of the following occur:  Fever over 100.4°F (38.0°C) for more than three days  Trouble breathing, wheezing or pain with breathing  Coughing up blood or increased amounts of colored sputum  Weakness, drowsiness, headache, facial pain, ear pain or a stiff neck  © 9589-8906 [facility], [address] [place] [address], [place] [address]. All rights reserved. This information is not intended as a substitute for professional medical care. Always follow your  healthcare professional's instructions.

## 2020-04-07 ENCOUNTER — TELEPHONE (OUTPATIENT)
Dept: FAMILY MEDICINE | Facility: CLINIC | Age: 75
End: 2020-04-07

## 2020-04-07 NOTE — TELEPHONE ENCOUNTER
----- Message from Josette Walker sent at 4/7/2020  2:48 PM CDT -----  Contact: VIOLETA CERVANTES [[id number]]  Type:  Patient Returning Call    Who Called:  VIOLETA CERVANTES [[id number]]    Who Left Message for Patient: Dania S    Does the patient know what this is regarding?: unknown    Can the clinic reply in [facility]: No    Best Call Back Number: [phone]    Additional Information: N/A

## 2020-04-07 NOTE — TELEPHONE ENCOUNTER
Please offer her virtual visit since she is on the portal if not virtual will do audio    Kirti Morris MD

## 2020-04-07 NOTE — TELEPHONE ENCOUNTER
----- Message from Paola Figueroa sent at 4/7/2020 12:26 PM CDT -----  Contact: Patient   Type:  Needs Medical Advice/Symptom-based Call    Who Called: Patient     Symptoms (please be specific): lingering cough     How long has patient had these symptoms:      Would the patient rather a call back or a response via [facility]? Call back     Best Call Back Number: [phone]          [id number]  [place] [address]  [place] [address]  Phone: [phone] Fax: [phone]

## 2020-04-08 ENCOUNTER — OFFICE VISIT (OUTPATIENT)
Dept: FAMILY MEDICINE | Facility: CLINIC | Age: 75
End: 2020-04-08
Payer: MEDICARE

## 2020-04-08 DIAGNOSIS — J44.9 CHRONIC OBSTRUCTIVE PULMONARY DISEASE, UNSPECIFIED COPD TYPE: Primary | Chronic | ICD-10-CM

## 2020-04-08 DIAGNOSIS — L25.9 CONTACT DERMATITIS, UNSPECIFIED CONTACT DERMATITIS TYPE, UNSPECIFIED TRIGGER: ICD-10-CM

## 2020-04-08 DIAGNOSIS — I70.1 RENAL ARTERY STENOSIS: ICD-10-CM

## 2020-04-08 DIAGNOSIS — E88.89 OTHER SPECIFIED METABOLIC DISORDERS: ICD-10-CM

## 2020-04-08 DIAGNOSIS — J44.9 CHRONIC OBSTRUCTIVE PULMONARY DISEASE, UNSPECIFIED COPD TYPE: Chronic | ICD-10-CM

## 2020-04-08 DIAGNOSIS — J20.9 ACUTE BRONCHITIS, UNSPECIFIED ORGANISM: ICD-10-CM

## 2020-04-08 PROCEDURE — 1159F MED LIST DOCD IN RCRD: CPT | Mod: ,,, | Performed by: FAMILY MEDICINE

## 2020-04-08 PROCEDURE — 99214 PR OFFICE/OUTPT VISIT, EST, LEVL IV, 30-39 MIN: ICD-10-PCS | Mod: 95,,, | Performed by: FAMILY MEDICINE

## 2020-04-08 PROCEDURE — 99499 UNLISTED E&M SERVICE: CPT | Mod: 95,,, | Performed by: FAMILY MEDICINE

## 2020-04-08 PROCEDURE — 1101F PR PT FALLS ASSESS DOC 0-1 FALLS W/OUT INJ PAST YR: ICD-10-PCS | Mod: CPTII,,, | Performed by: FAMILY MEDICINE

## 2020-04-08 PROCEDURE — 1159F PR MEDICATION LIST DOCUMENTED IN MEDICAL RECORD: ICD-10-PCS | Mod: ,,, | Performed by: FAMILY MEDICINE

## 2020-04-08 PROCEDURE — 99499 RISK ADDL DX/OHS AUDIT: ICD-10-PCS | Mod: 95,,, | Performed by: FAMILY MEDICINE

## 2020-04-08 PROCEDURE — 99214 OFFICE O/P EST MOD 30 MIN: CPT | Mod: 95,,, | Performed by: FAMILY MEDICINE

## 2020-04-08 PROCEDURE — 1101F PT FALLS ASSESS-DOCD LE1/YR: CPT | Mod: CPTII,,, | Performed by: FAMILY MEDICINE

## 2020-04-08 RX ORDER — ALBUTEROL SULFATE 90 UG/1
2 AEROSOL, METERED RESPIRATORY (INHALATION) EVERY 6 HOURS PRN
Qty: 18 G | Refills: 0 | Status: SHIPPED | OUTPATIENT
Start: 2020-04-08 | End: 2020-04-08

## 2020-04-08 RX ORDER — ALBUTEROL SULFATE 90 UG/1
AEROSOL, METERED RESPIRATORY (INHALATION)
Qty: 25.5 G | Refills: 0 | Status: ON HOLD | OUTPATIENT
Start: 2020-04-08 | End: 2020-06-26

## 2020-04-08 RX ORDER — AZITHROMYCIN 250 MG/1
TABLET, FILM COATED ORAL
Qty: 6 TABLET | Refills: 0 | Status: SHIPPED | OUTPATIENT
Start: 2020-04-08 | End: 2020-04-13

## 2020-04-08 RX ORDER — METHYLPREDNISOLONE 4 MG/1
TABLET ORAL
Qty: 1 PACKAGE | Refills: 0 | Status: ON HOLD | OUTPATIENT
Start: 2020-04-08 | End: 2020-06-25 | Stop reason: HOSPADM

## 2020-04-08 NOTE — PROGRESS NOTES
Chief Complaint   Patient presents with    Cough       HPI  Ariadna Villegas is a 75 y.o. female with multiple medical diagnoses as listed in the medical history and problem list that presents for evaluation for cough    Cough  She has been having a cough for several weeks, she was seen last month and treated with a medrol dose ruthy  She has not had fever and has been inside her home. The cough is mildly productive  She notes it is not getting worse but not resolving. No wheezing or shortness of breath; she reports no n/v/d, no nasal or sinus symptoms does not have an inhaler    PAST MEDICAL HISTORY:  Past Medical History:   Diagnosis Date    Arthritis     neck and back    COPD (chronic obstructive pulmonary disease)     Malignant hypertension     Non-small cell lung cancer 2018    Osteoporosis, postmenopausal     Tobacco use disorder        PAST SURGICAL HISTORY:  Past Surgical History:   Procedure Laterality Date     x3      HYSTERECTOMY      LUNG BIOPSY N/A 2018    Procedure: Biopsy-Lung;  Surgeon: Jesus Diagnostic Provider;  Location: [facility] OR [address] [place] [address];  Service: Radiology;  Laterality: N/A;    LUNG CANCER SURGERY Right 2019    PARTIAL HYSTERECTOMY      , after her third     SALPINGOOPHORECTOMY      , for a ovarian cyst    THORACOSCOPIC WEDGE RESECTION OF LUNG Right 2019    Procedure: VATS, WITH WEDGE RESECTION, LUNG;  Surgeon: Celso Jewell MD;  Location: [facility] OR [address] [place] [address];  Service: Thoracic;  Laterality: Right;       SOCIAL HISTORY:  Social History     Socioeconomic History    Marital status:      Spouse name: Not on file    Number of children: Not on file    Years of education: Not on file    Highest education level: Not on file   Occupational History    Occupation: Retired   Social Needs    Financial resource strain: Not on file    Food insecurity:     Worry: Not on file     Inability: Not on file    Transportation needs:      Medical: Not on file     Non-medical: Not on file   Tobacco Use    Smoking status: Current Every Day Smoker     Packs/day: 0.15     Years: 60.00     Pack years: 9.00     Types: Cigarettes    Smokeless tobacco: Never Used    Tobacco comment: quit x 2-3 wks. ago   Substance and Sexual Activity    Alcohol use: Yes     Comment: socially    Drug use: No    Sexual activity: Never     Partners: Male     Comment:    Lifestyle    Physical activity:     Days per week: Not on file     Minutes per session: Not on file    Stress: Not on file   Relationships    Social connections:     Talks on phone: Not on file     Gets together: Not on file     Attends Worship service: Not on file     Active member of club or organization: Not on file     Attends meetings of clubs or organizations: Not on file     Relationship status: Not on file   Other Topics Concern    Not on file   Social History Narrative    Not on file       FAMILY HISTORY:  Family History   Problem Relation Age of Onset    Heart disease Mother     Diabetes Mother     Stroke Father     Heart disease Sister     Anesthesia problems Neg Hx        ALLERGIES AND MEDICATIONS: updated and reviewed.  Review of patient's allergies indicates:   Allergen Reactions    Lunesta [eszopiclone] Other (See Comments)     Sleep walking     Current Outpatient Medications   Medication Sig Dispense Refill    aspirin (ECOTRIN) 81 MG EC tablet Take 1 tablet (81 mg total) by mouth once daily.      gabapentin (NEURONTIN) 300 MG capsule Take 300 mg by mouth 3 (three) times daily.       losartan-hydrochlorothiazide 100-25 mg (HYZAAR) 100-25 mg per tablet Take 1 tablet by mouth once daily. 90 tablet 3    metoprolol succinate (TOPROL-XL) 50 MG 24 hr tablet Take 1 tablet (50 mg total) by mouth once daily. 90 tablet 3    rosuvastatin (CRESTOR) 20 MG tablet Take 1 tablet (20 mg total) by mouth once daily. 30 tablet 11    albuterol (PROVENTIL/VENTOLIN HFA) 90 mcg/actuation  inhaler Inhale 2 puffs into the lungs every 6 (six) hours as needed for Wheezing. 18 g 0    azelastine (ASTELIN) 137 mcg (0.1 %) nasal spray 1 spray (137 mcg total) by Nasal route 2 (two) times daily. 30 mL 0    azithromycin (Z-ESME) 250 MG tablet Take 2 tablets by mouth on day 1; Take 1 tablet by mouth on days 2-5 6 tablet 0    methylPREDNISolone (MEDROL DOSEPACK) 4 mg tablet use as directed 1 Package 0    triamcinolone acetonide 0.1% (KENALOG) 0.1 % cream Apply topically 2 (two) times daily. 45 g 1     No current facility-administered medications for this visit.        ROS  Review of Systems   Constitutional: Negative for chills, diaphoresis, fatigue, fever and unexpected weight change.   HENT: Negative for rhinorrhea, sinus pressure, sore throat and tinnitus.    Eyes: Negative for photophobia and visual disturbance.   Respiratory: Positive for cough. Negative for shortness of breath and wheezing.    Cardiovascular: Negative for chest pain and palpitations.   Gastrointestinal: Negative for abdominal pain, blood in stool, constipation, diarrhea, nausea and vomiting.   Genitourinary: Negative for dysuria, flank pain, frequency and vaginal discharge.   Musculoskeletal: Negative for arthralgias and joint swelling.   Skin: Negative for rash.   Neurological: Negative for speech difficulty, weakness, light-headedness and headaches.   Psychiatric/Behavioral: Negative for behavioral problems and dysphoric mood.       Physical Exam              Physical Exam   Constitutional: She is oriented to person, place, and time. She appears well-developed and well-nourished.   Eyes: EOM are normal.   Neurological: She is alert and oriented to person, place, and time.   Skin: Skin is warm and dry. No rash noted. No erythema.   Psychiatric: She has a normal mood and affect. Her behavior is normal.   Nursing note and vitals reviewed.      Health Maintenance       Date Due Completion Date    Shingles Vaccine (1 of 2) 01/30/1994 ---     Influenza Vaccine (1) 09/01/2019 ---    Mammogram 04/06/2020 4/6/2018    Override on 3/28/2017: Declined    Override on 3/25/2014: Declined (not at this present time)    Override on 5/14/2012: Done    Lipid Panel 09/16/2020 9/16/2019    Aspirin/Antiplatelet Therapy 03/06/2021 3/6/2020    DEXA SCAN 07/26/2021 7/26/2018    Override on 3/25/2014: Declined (not at this present time)    Override on 6/12/2012: Done    Colonoscopy 10/30/2021 10/30/2018    Override on 1/1/2008: Done    Override on 5/14/2006: Done    TETANUS VACCINE 08/24/2026 8/24/2016 (Declined)    Override on 8/24/2016: Declined          Health maintenance reviewed and addressed as ordered      ASSESSMENT     1. Chronic obstructive pulmonary disease, unspecified COPD type    2. Acute bronchitis, unspecified organism    3. Contact dermatitis, unspecified contact dermatitis type, unspecified trigger    4. Other specified metabolic disorders    5. Sedative hypnotic or anxiolytic dependence    6. Renal artery stenosis        PLAN:     Problem List Items Addressed This Visit        Psychiatric           Pulmonary    COPD (chronic obstructive pulmonary disease) - Primary (Chronic)  -will add bronchodilator for cough    Overview     Mild per pft.  +emphysematous changes on ct.         Relevant Medications    azithromycin (Z-ESME) 250 MG tablet    albuterol (PROVENTIL/VENTOLIN HFA) 90 mcg/actuation inhaler       Cardiac/Vascular    Renal artery stenosis (Chronic)  -stable renal function and BP control       Endocrine          Other Visit Diagnoses     Acute bronchitis, unspecified organism      -will add albuterol inhaler and repeat medrol dose pack along with abx  -follow up in 2mos for x ray if symptoms fail to improve  -COVID testing if they worsen    Relevant Medications    methylPREDNISolone (MEDROL DOSEPACK) 4 mg tablet    Contact dermatitis, unspecified contact dermatitis type, unspecified trigger      -resolving with previous treatment            Kirti  MD Arturo  04/08/2020 2:37 PM        Follow up in about 2 months (around 6/8/2020) for Follow up.              The patient location is:  Patient Home   The chief complaint leading to consultation is: cough  Visit type: Virtual visit with synchronous audio and video  Total time spent with patient: 15 min  Each patient to whom he or she provides medical services by telemedicine is:  (1) informed of the relationship between the physician and patient and the respective role of any other health care provider with respect to management of the patient; and (2) notified that he or she may decline to receive medical services by telemedicine and may withdraw from such care at any time.

## 2020-05-13 ENCOUNTER — PATIENT MESSAGE (OUTPATIENT)
Dept: FAMILY MEDICINE | Facility: CLINIC | Age: 75
End: 2020-05-13

## 2020-05-13 DIAGNOSIS — R05.3 PERSISTENT COUGH FOR 3 WEEKS OR LONGER: Primary | ICD-10-CM

## 2020-05-14 RX ORDER — BENZONATATE 200 MG/1
200 CAPSULE ORAL 3 TIMES DAILY PRN
Qty: 30 CAPSULE | Refills: 0 | Status: SHIPPED | OUTPATIENT
Start: 2020-05-14 | End: 2020-05-24

## 2020-05-28 ENCOUNTER — OFFICE VISIT (OUTPATIENT)
Dept: FAMILY MEDICINE | Facility: CLINIC | Age: 75
End: 2020-05-28
Payer: MEDICARE

## 2020-05-28 VITALS
HEIGHT: 60 IN | HEART RATE: 95 BPM | SYSTOLIC BLOOD PRESSURE: 116 MMHG | WEIGHT: 82.94 LBS | OXYGEN SATURATION: 97 % | BODY MASS INDEX: 16.28 KG/M2 | DIASTOLIC BLOOD PRESSURE: 82 MMHG

## 2020-05-28 DIAGNOSIS — F33.1 MODERATE EPISODE OF RECURRENT MAJOR DEPRESSIVE DISORDER: Primary | ICD-10-CM

## 2020-05-28 DIAGNOSIS — I10 ESSENTIAL HYPERTENSION: Chronic | ICD-10-CM

## 2020-05-28 DIAGNOSIS — C34.91 SQUAMOUS CELL CARCINOMA OF RIGHT LUNG: ICD-10-CM

## 2020-05-28 DIAGNOSIS — E78.2 MIXED HYPERLIPIDEMIA: Chronic | ICD-10-CM

## 2020-05-28 DIAGNOSIS — N18.30 CKD (CHRONIC KIDNEY DISEASE) STAGE 3, GFR 30-59 ML/MIN: ICD-10-CM

## 2020-05-28 DIAGNOSIS — F51.04 PSYCHOPHYSIOLOGICAL INSOMNIA: ICD-10-CM

## 2020-05-28 DIAGNOSIS — J43.2 CENTRILOBULAR EMPHYSEMA: Chronic | ICD-10-CM

## 2020-05-28 PROCEDURE — 1101F PT FALLS ASSESS-DOCD LE1/YR: CPT | Mod: CPTII,S$GLB,, | Performed by: NURSE PRACTITIONER

## 2020-05-28 PROCEDURE — 99214 PR OFFICE/OUTPT VISIT, EST, LEVL IV, 30-39 MIN: ICD-10-PCS | Mod: S$GLB,,, | Performed by: NURSE PRACTITIONER

## 2020-05-28 PROCEDURE — 3074F PR MOST RECENT SYSTOLIC BLOOD PRESSURE < 130 MM HG: ICD-10-PCS | Mod: CPTII,S$GLB,, | Performed by: NURSE PRACTITIONER

## 2020-05-28 PROCEDURE — 1159F MED LIST DOCD IN RCRD: CPT | Mod: S$GLB,,, | Performed by: NURSE PRACTITIONER

## 2020-05-28 PROCEDURE — 99999 PR PBB SHADOW E&M-EST. PATIENT-LVL IV: ICD-10-PCS | Mod: PBBFAC,,, | Performed by: NURSE PRACTITIONER

## 2020-05-28 PROCEDURE — 3079F DIAST BP 80-89 MM HG: CPT | Mod: CPTII,S$GLB,, | Performed by: NURSE PRACTITIONER

## 2020-05-28 PROCEDURE — 99214 OFFICE O/P EST MOD 30 MIN: CPT | Mod: S$GLB,,, | Performed by: NURSE PRACTITIONER

## 2020-05-28 PROCEDURE — 1126F AMNT PAIN NOTED NONE PRSNT: CPT | Mod: S$GLB,,, | Performed by: NURSE PRACTITIONER

## 2020-05-28 PROCEDURE — 3074F SYST BP LT 130 MM HG: CPT | Mod: CPTII,S$GLB,, | Performed by: NURSE PRACTITIONER

## 2020-05-28 PROCEDURE — 1101F PR PT FALLS ASSESS DOC 0-1 FALLS W/OUT INJ PAST YR: ICD-10-PCS | Mod: CPTII,S$GLB,, | Performed by: NURSE PRACTITIONER

## 2020-05-28 PROCEDURE — 1126F PR PAIN SEVERITY QUANTIFIED, NO PAIN PRESENT: ICD-10-PCS | Mod: S$GLB,,, | Performed by: NURSE PRACTITIONER

## 2020-05-28 PROCEDURE — 1159F PR MEDICATION LIST DOCUMENTED IN MEDICAL RECORD: ICD-10-PCS | Mod: S$GLB,,, | Performed by: NURSE PRACTITIONER

## 2020-05-28 PROCEDURE — 3079F PR MOST RECENT DIASTOLIC BLOOD PRESSURE 80-89 MM HG: ICD-10-PCS | Mod: CPTII,S$GLB,, | Performed by: NURSE PRACTITIONER

## 2020-05-28 PROCEDURE — 99999 PR PBB SHADOW E&M-EST. PATIENT-LVL IV: CPT | Mod: PBBFAC,,, | Performed by: NURSE PRACTITIONER

## 2020-05-28 RX ORDER — TIOTROPIUM BROMIDE 18 UG/1
18 CAPSULE ORAL; RESPIRATORY (INHALATION) DAILY
Qty: 90 CAPSULE | Refills: 1 | Status: SHIPPED | OUTPATIENT
Start: 2020-05-28 | End: 2022-11-21 | Stop reason: SDUPTHER

## 2020-05-28 RX ORDER — HYDROXYZINE HYDROCHLORIDE 25 MG/1
25 TABLET, FILM COATED ORAL NIGHTLY
Qty: 30 TABLET | Refills: 2 | Status: ON HOLD | OUTPATIENT
Start: 2020-05-28 | End: 2020-06-25 | Stop reason: HOSPADM

## 2020-05-28 RX ORDER — SERTRALINE HYDROCHLORIDE 50 MG/1
50 TABLET, FILM COATED ORAL DAILY
Qty: 30 TABLET | Refills: 2 | Status: ON HOLD | OUTPATIENT
Start: 2020-05-28 | End: 2020-06-25 | Stop reason: HOSPADM

## 2020-05-28 RX ORDER — OXYCODONE HYDROCHLORIDE 5 MG/1
TABLET ORAL
Status: ON HOLD | COMMUNITY
Start: 2020-05-11 | End: 2020-06-25 | Stop reason: HOSPADM

## 2020-05-28 NOTE — PATIENT INSTRUCTIONS
Depression  Depression is one of the most common mental health problems today. It is not just a state of unhappiness or sadness. It is a true disease. The cause seems to be related to a decrease in chemicals that transmit signals in the brain. Having a family history of depression, alcoholism, or suicide increases the risk. Chronic illness, chronic pain, migraine headaches and high emotional stress also increase the risk.  Depression is something we tend to recognize in others, but may have a hard time seeing in ourselves. It can show in many physical and emotional ways:  · Loss of appetite  · Over-eating  · Not being able to sleep  · Sleeping too much  · Tiredness not related to physical exertion  · Restlessness or irritability  · Slowness of movement or speech  · Feeling depressed or withdrawn  · Loss of interest in things you once enjoyed  · Trouble concentrating, poor memory, trouble making decisions  · Thoughts of harming or killing oneself, or thoughts that life is not worth living  · Low self-esteem  The treatment for depression may include both medicine and psychotherapy. Antidepressants can reduce suffering and can improve the ability to function during the depressed period. Therapy can offer emotional support and help you understand emotional factors that may be causing the depression.  Home care  · On-going care and support helps people manage this disease.  Find a healthcare provider and therapist who meet your needs. Seek help when you feel like you may be getting ill.  · Be kind to yourself. Make it a point to do things that you enjoy (gardening, walking in nature, going to a movie, etc.). Reward yourself for small successes.  · Take care of your physical body. Eat a balanced diet (low in saturated fat and high in fruits and vegetables). Exercise at least 3 times a week for 30 minutes. Even mild-moderate exercise (like brisk walking) can make you feel better.  · Avoid alcohol, which can make  depression worse.  · Take medicine as prescribed.  · Tell each of your healthcare providers about all of the prescription drugs, over-the-counter medicines, vitamins, and supplements you take. Certain supplements interact with medicines and can result in dangerous side effects. Ask your pharmacist when you have questions about drug interactions.  · Talk with your family and trusted friends about your feelings and thoughts. Ask them to help you recognize behavior changes early so you can get help and, if needed, medicine can be adjusted.  Follow-up care  Follow up with your healthcare provider, or as advised.  Call 911  Call 911 if you:  · Have suicidal thoughts, a suicide plan, and the means to carry out the plan  · Have trouble breathing  · Are very confused  · Feel very drowsy or have trouble awakening  · Faint or lose consciousness  · Have new chest pain that becomes more severe, lasts longer, or spreads into your shoulder, arm, neck, jaw or back  When to seek medical advice  Call your healthcare provider right away if any of these occur:  · Feeling extreme depression, fear, anxiety, or anger toward yourself or others  · Feeling out of control  · Feeling that you may try to harm yourself or another  · Hearing voices that others do not hear  · Seeing things that others do not see  · Cant sleep or eat for 3 days in a row  · Friends or family express concern over your behavior and ask you to seek help  Date Last Reviewed: 9/29/2015  © 8771-8398 [employer] [address] [place] [address], [place] [address]. All rights reserved. This information is not intended as a substitute for professional medical care. Always follow your healthcare professional's instructions.

## 2020-05-28 NOTE — PROGRESS NOTES
"History of Present Illness   Ariadna Villegas is a 75 y.o. woman with medical history as listed below who presents today for evaluation of depressed mood. She reports she has been feeling depressed for several months, but her symptoms have worsened significantly over the last 3 weeks. She reports "crying randomly throughout the day," loss of appetite, loss of interest in activities or interaction with others, excessive worrying, overall fatigue, insomnia at night and sleeping throughout the day. She relates this to coping with her chronic medical conditions. She denies SI or HI. She would like to discuss antidepressant medication for management of symptoms. PHQ-9 as below, completed today.    PHQ9 2020   Total Score 19         Past Medical History:   Diagnosis Date    Arthritis     neck and back    COPD (chronic obstructive pulmonary disease)     Malignant hypertension     Non-small cell lung cancer 2018    Osteoporosis, postmenopausal     Tobacco use disorder        Past Surgical History:   Procedure Laterality Date     x3      HYSTERECTOMY      LUNG BIOPSY N/A 2018    Procedure: Biopsy-Lung;  [facility];  Location: [facility] OR [address] [place] [address];  Service: Radiology;  Laterality: N/A;    LUNG CANCER SURGERY Right 2019    PARTIAL HYSTERECTOMY      , after her third     SALPINGOOPHORECTOMY      , for a ovarian cyst    THORACOSCOPIC WEDGE RESECTION OF LUNG Right 2019    Procedure: VATS, WITH WEDGE RESECTION, LUNG;  Surgeon: Celso Jewell MD;  Location: [facility] OR [address] [place] [address];  Service: Thoracic;  Laterality: Right;       Social History     Socioeconomic History    Marital status:      Spouse name: Not on file    Number of children: Not on file    Years of education: Not on file    Highest education level: Not on file   Occupational History    Occupation: Retired   Social Needs    Financial resource strain: Not on file    Food " insecurity:     Worry: Not on file     Inability: Not on file    Transportation needs:     Medical: Not on file     Non-medical: Not on file   Tobacco Use    Smoking status: Current Every Day Smoker     Packs/day: 0.15     Years: 60.00     Pack years: 9.00     Types: Cigarettes    Smokeless tobacco: Never Used    Tobacco comment: quit x 2-3 wks. ago   Substance and Sexual Activity    Alcohol use: Yes     Comment: socially    Drug use: No    Sexual activity: Never     Partners: Male     Comment:    Lifestyle    Physical activity:     Days per week: Not on file     Minutes per session: Not on file    Stress: Not on file   Relationships    Social connections:     Talks on phone: Not on file     Gets together: Not on file     Attends [employer] service: Not on file     Active member of club or organization: Not on file     Attends meetings of clubs or organizations: Not on file     Relationship status: Not on file   Other Topics Concern    Not on file   Social History Narrative    Not on file       Family History   Problem Relation Age of Onset    Heart disease Mother     Diabetes Mother     Stroke Father     Heart disease Sister     Anesthesia problems Neg Hx        Review of Systems  Review of Systems   Constitutional: Positive for malaise/fatigue and weight loss. Negative for chills and fever.   Respiratory: Positive for cough (chronic) and shortness of breath (chronic). Negative for hemoptysis, sputum production and wheezing.    Cardiovascular: Negative for chest pain, palpitations and leg swelling.   Psychiatric/Behavioral: Positive for depression. Negative for substance abuse and suicidal ideas. The patient has insomnia. The patient is not nervous/anxious.      A complete review of systems was otherwise negative.    Physical Exam  /82   Pulse 95   Ht 5' (1.524 m)   Wt 37.6 kg (82 lb 15.4 oz)   SpO2 97%   BMI 16.20 kg/m²   General appearance: alert, appears stated age, cooperative  and no distress  Lungs: clear to auscultation bilaterally  Heart: regular rate and rhythm, S1, S2 normal, no murmur, click, rub or gallop  Extremities: extremities normal, atraumatic, no cyanosis or edema  Pulses: 2+ and symmetric  Skin: Skin color, texture, turgor normal. No rashes or lesions  Neurologic: Grossly normal  Psychiatric: She displays depressed mood and is tearful at times during our visit. She denies SI, HI, AH, or VH.    Assessment/Plan  Moderate episode of recurrent major depressive disorder  PHQ-9 today at 19.  Start SSRI daily daily hydroxyzine at bedtime for insomnia.  We discussed therapy/counseling which she has declined.  Discussed home coping mechanisms and handout provided.  ER precautions discussed.  I will see her back in 2 weeks for depression follow-up, recalculate PHQ-9 at that time.  -     sertraline (ZOLOFT) 50 MG tablet; Take 1 tablet (50 mg total) by mouth once daily.  Dispense: 30 tablet; Refill: 2  -     hydrOXYzine HCL (ATARAX) 25 MG tablet; Take 1 tablet (25 mg total) by mouth every evening.  Dispense: 30 tablet; Refill: 2    Psychophysiological insomnia  As above.  -     sertraline (ZOLOFT) 50 MG tablet; Take 1 tablet (50 mg total) by mouth once daily.  Dispense: 30 tablet; Refill: 2  -     hydrOXYzine HCL (ATARAX) 25 MG tablet; Take 1 tablet (25 mg total) by mouth every evening.  Dispense: 30 tablet; Refill: 2    Centrilobular emphysema  She would like to resume Spiriva as the Albuterol rescue inhaler is too difficult for her to use. Refill placed.  -     tiotropium (SPIRIVA) 18 mcg inhalation capsule; Inhale 1 capsule (18 mcg total) into the lungs once daily. Controller  Dispense: 90 capsule; Refill: 1    Essential hypertension  The current medical regimen is effective;  continue present plan and medications.    Mixed hyperlipidemia  The current medical regimen is effective;  continue present plan and medications.    CKD (chronic kidney disease) stage 3, GFR 30-59 ml/min  The  current medical regimen is effective;  continue present plan and medications.    Squamous cell carcinoma of right lung  The current medical regimen is effective;  continue present plan and medications.    Patient has verbalized understanding and is in agreement with plan of care.    Follow up in about 2 weeks (around 6/11/2020) for depression follow-up.

## 2020-06-12 ENCOUNTER — OFFICE VISIT (OUTPATIENT)
Dept: FAMILY MEDICINE | Facility: CLINIC | Age: 75
End: 2020-06-12
Payer: MEDICARE

## 2020-06-12 DIAGNOSIS — Z01.00 ROUTINE EYE EXAM: ICD-10-CM

## 2020-06-12 DIAGNOSIS — F33.0 MILD EPISODE OF RECURRENT MAJOR DEPRESSIVE DISORDER: Primary | ICD-10-CM

## 2020-06-12 DIAGNOSIS — G47.00 INSOMNIA, UNSPECIFIED TYPE: ICD-10-CM

## 2020-06-12 DIAGNOSIS — E78.2 MIXED HYPERLIPIDEMIA: Chronic | ICD-10-CM

## 2020-06-12 DIAGNOSIS — J43.2 CENTRILOBULAR EMPHYSEMA: Chronic | ICD-10-CM

## 2020-06-12 DIAGNOSIS — I10 ESSENTIAL HYPERTENSION: Chronic | ICD-10-CM

## 2020-06-12 DIAGNOSIS — N18.30 CKD (CHRONIC KIDNEY DISEASE) STAGE 3, GFR 30-59 ML/MIN: ICD-10-CM

## 2020-06-12 DIAGNOSIS — Z12.31 ENCOUNTER FOR SCREENING MAMMOGRAM FOR MALIGNANT NEOPLASM OF BREAST: ICD-10-CM

## 2020-06-12 DIAGNOSIS — C34.91 SQUAMOUS CELL CARCINOMA OF RIGHT LUNG: ICD-10-CM

## 2020-06-12 DIAGNOSIS — Z12.39 SCREENING FOR MALIGNANT NEOPLASM OF BREAST: ICD-10-CM

## 2020-06-12 PROCEDURE — 1159F MED LIST DOCD IN RCRD: CPT | Mod: 95,,, | Performed by: NURSE PRACTITIONER

## 2020-06-12 PROCEDURE — 1101F PR PT FALLS ASSESS DOC 0-1 FALLS W/OUT INJ PAST YR: ICD-10-PCS | Mod: CPTII,95,, | Performed by: NURSE PRACTITIONER

## 2020-06-12 PROCEDURE — 1101F PT FALLS ASSESS-DOCD LE1/YR: CPT | Mod: CPTII,95,, | Performed by: NURSE PRACTITIONER

## 2020-06-12 PROCEDURE — 1159F PR MEDICATION LIST DOCUMENTED IN MEDICAL RECORD: ICD-10-PCS | Mod: 95,,, | Performed by: NURSE PRACTITIONER

## 2020-06-12 PROCEDURE — 99214 PR OFFICE/OUTPT VISIT, EST, LEVL IV, 30-39 MIN: ICD-10-PCS | Mod: 95,,, | Performed by: NURSE PRACTITIONER

## 2020-06-12 PROCEDURE — 99214 OFFICE O/P EST MOD 30 MIN: CPT | Mod: 95,,, | Performed by: NURSE PRACTITIONER

## 2020-06-12 NOTE — PROGRESS NOTES
The patient location is: [facility].  The chief complaint leading to consultation is: depression follow-up.    Visit type: audiovisual    Face to Face time with patient: 15 minutes  5 minutes of total time spent on the encounter, which includes face to face time and non-face to face time preparing to see the patient (eg, review of tests), Obtaining and/or reviewing separately obtained history, Documenting clinical information in the electronic or other health record, Independently interpreting results (not separately reported) and communicating results to the patient/family/caregiver, or Care coordination (not separately reported).         Each patient to whom he or she provides medical services by telemedicine is:  (1) informed of the relationship between the physician and patient and the respective role of any other health care provider with respect to management of the patient; and (2) notified that he or she may decline to receive medical services by telemedicine and may withdraw from such care at any time.    Notes:     History of Present Illness   Ariadna Villegas is a 75 y.o. woman with medical history as listed below who presents today for follow-up on depression. She is doing well on the Zoloft. She reports improvement in mood, not sleeping throughout the day, feeling more energized, less irritable, and appetite has improved- actually gained 7 pounds. She reports sleep is still disrupted, falling asleep easily but waking up 3-4 times throughout the night despite taking Hydroxyzine as directed. She denies SI or HI. Overall, doing much better, wishes to continue SSRI therapy. PHQ-9 as below.        Depression Patient Health Questionnaire 6/12/2020 5/28/2020 9/25/2019 6/21/2019 5/27/2019 5/27/2019 1/28/2019   Over the last two weeks how often have you been bothered by little interest or pleasure in doing things 1 3 1 1 0 0 0   Over the last two weeks how often have you been bothered by feeling  down, depressed or hopeless 0 3 0 1 0 0 0   PHQ-2 Total Score 1 6 1 2 0 0 0   Over the last two weeks how often have you been bothered by trouble falling or staying asleep, or sleeping too much - 3 - - - - -   Over the last two weeks how often have you been bothered by feeling tired or having little energy - 3 - - - - -   Over the last two weeks how often have you been bothered by a poor appetite or overeating - 3 - - - - -   Over the last two weeks how often have you been bothered by feeling bad about yourself - or that you are a failure or have let yourself or your family down - 0 - - - - -   Over the last two weeks how often have you been bothered by trouble concentrating on things, such as reading the newspaper or watching television - 3 - - - - -   Over the last two weeks how often have you been bothered by moving or speaking so slowly that other people could have noticed. Or the opposite - being so fidgety or restless that you have been moving around a lot more than usual. - 1 - - - - -   Over the last two weeks how often have you been bothered by thoughts that you would be better off dead, or of hurting yourself - 0 - - - - -   If you checked off any problems, how difficult have these problems made it for you to do your work, take care of things at home or get along with other people? - Very difficult - - - - -   Total Score 2 19 - - - - -             Past Medical History:   Diagnosis Date    Arthritis     neck and back    COPD (chronic obstructive pulmonary disease)     Malignant hypertension     Non-small cell lung cancer 2018    Osteoporosis, postmenopausal     Tobacco use disorder        Past Surgical History:   Procedure Laterality Date     x3      HYSTERECTOMY      LUNG BIOPSY N/A 2018    Procedure: Biopsy-Lung;  Surgeon: [facility];  Location: [facility] OR [address] [place] [address];  Service: Radiology;  Laterality: N/A;    LUNG CANCER SURGERY Right 2019    PARTIAL  HYSTERECTOMY      , after her third     SALPINGOOPHORECTOMY      , for a ovarian cyst    THORACOSCOPIC WEDGE RESECTION OF LUNG Right 2019    Procedure: VATS, WITH WEDGE RESECTION, LUNG;  Surgeon: Celso Jewell MD;  Location: [facility] OR [address] [place] [address];  Service: Thoracic;  Laterality: Right;       Social History     Socioeconomic History    Marital status:      Spouse name: Not on file    Number of children: Not on file    Years of education: Not on file    Highest education level: Not on file   Occupational History    Occupation: Retired   Social Needs    Financial resource strain: Not on file    Food insecurity:     Worry: Not on file     Inability: Not on file    Transportation needs:     Medical: Not on file     Non-medical: Not on file   Tobacco Use    Smoking status: Current Every Day Smoker     Packs/day: 0.15     Years: 60.00     Pack years: 9.00     Types: Cigarettes    Smokeless tobacco: Never Used    Tobacco comment: quit x 2-3 wks. ago   Substance and Sexual Activity    Alcohol use: Yes     Comment: socially    Drug use: No    Sexual activity: Never     Partners: Male     Comment:    Lifestyle    Physical activity:     Days per week: Not on file     Minutes per session: Not on file    Stress: Not on file   Relationships    Social connections:     Talks on phone: Not on file     Gets together: Not on file     Attends Latter day service: Not on file     Active member of club or organization: Not on file     Attends meetings of clubs or organizations: Not on file     Relationship status: Not on file   Other Topics Concern    Not on file   Social History Narrative    Not on file       Family History   Problem Relation Age of Onset    Heart disease Mother     Diabetes Mother     Stroke Father     Heart disease Sister     Anesthesia problems Neg Hx        Review of Systems  Review of Systems   Constitutional: Negative for malaise/fatigue and weight loss.    Respiratory: Positive for cough and wheezing. Negative for shortness of breath.    Cardiovascular: Negative for chest pain and palpitations.   Psychiatric/Behavioral: Negative for depression and suicidal ideas. The patient has insomnia. The patient is not nervous/anxious.      A complete review of systems was otherwise negative.    Physical Exam  General appearance: alert, appears stated age, cooperative and no distress  Lungs: respirations are even and unlabored, no respiratory distress noted  Skin: no visible rash or lesions  Neurologic: Grossly normal  Psychiatric: She displays normal mood and affect, denies SI or HI, does not appear anxious, flat, or depressed. Thought content and perception normal.    Assessment/Plan  Mild episode of recurrent major depressive disorder  Greatly improved, continue SSRI therapy.  Follow-up in 3 months.    Insomnia, unspecified type  Continue hydroxyzine.  Add melatonin OTC.  Sleep hygiene encouraged.    Centrilobular emphysema  The current medical regimen is effective;  continue present plan and medications.    Squamous cell carcinoma of right lung  The current medical regimen is effective;  continue present plan and medications.    CKD (chronic kidney disease) stage 3, GFR 30-59 ml/min  The current medical regimen is effective;  continue present plan and medications.  Avoid nephrotoxic agents such as NSAID, low sodium, hydration.    Essential hypertension  The current medical regimen is effective;  continue present plan and medications.    Mixed hyperlipidemia  The current medical regimen is effective;  continue present plan and medications.    Routine eye exam  Believes she needs adjustment to RX for glasses- referral placed.  -     Ambulatory referral/consult to Optometry; Future; Expected date: 06/19/2020      Encounter for screening mammogram for malignant neoplasm of breast   Will call to schedule.  -     Mammo Digital Screening Bilateral With CAD; Future; Expected date:  06/12/2020    Patient has verbalized understanding and is in agreement with plan of care.    Follow up in about 3 months (around 9/12/2020) for follow-up chronic conditions.    Answers for HPI/ROS submitted by the patient on 6/12/2020   activity change: No  unexpected weight change: Yes  rhinorrhea: No  trouble swallowing: No  visual disturbance: No  chest tightness: No  polyuria: No  difficulty urinating: No  menstrual problem: No  joint swelling: No  arthralgias: No  confusion: No  dysphoric mood: No

## 2020-06-22 ENCOUNTER — TELEPHONE (OUTPATIENT)
Dept: HEMATOLOGY/ONCOLOGY | Facility: CLINIC | Age: 75
End: 2020-06-22

## 2020-06-22 ENCOUNTER — TELEPHONE (OUTPATIENT)
Dept: CARDIOTHORACIC SURGERY | Facility: HOSPITAL | Age: 75
End: 2020-06-22

## 2020-06-22 DIAGNOSIS — C34.90 MALIGNANT NEOPLASM OF UNSPECIFIED PART OF UNSPECIFIED BRONCHUS OR LUNG: ICD-10-CM

## 2020-06-22 NOTE — TELEPHONE ENCOUNTER
Pt's daughter called clinic to schedule follow up appointment for F/U and CCT scan with labs. Answered pt's questions and scheduled appointment.

## 2020-06-22 NOTE — TELEPHONE ENCOUNTER
Returned phone call to patient's daughter. Since patient has been following with Dr. Song on the [facility], she does not need to see Dr. Jewell.  It seems Ms. Villegas has missed a CT and appointment with Dr. Song possibly due to COVID. I recommended she reschedule both as soon as possible. Patient's daughter was relieved to no have to travel to [place] for an additional visit. Gave her our office phone number to call with any additional questions or concerns.     ----- Message from Lalo Logan sent at 6/22/2020  1:10 PM CDT -----  Regarding: if appt is needed at this time        The Pt's daughter Cici would like to know if the Pt is supposed to have a yearly F/U appt with you.  She states that the Pt had a F/U appt a year after her surgery, but now is around the time the Pt would have another F/U appt.    Please contact the caller to discuss.    Phone # [phone]

## 2020-06-24 ENCOUNTER — OFFICE VISIT (OUTPATIENT)
Dept: URGENT CARE | Facility: CLINIC | Age: 75
End: 2020-06-24
Payer: MEDICARE

## 2020-06-24 ENCOUNTER — TELEPHONE (OUTPATIENT)
Dept: FAMILY MEDICINE | Facility: CLINIC | Age: 75
End: 2020-06-24

## 2020-06-24 ENCOUNTER — HOSPITAL ENCOUNTER (OUTPATIENT)
Facility: HOSPITAL | Age: 75
Discharge: HOME | End: 2020-06-26
Attending: EMERGENCY MEDICINE | Admitting: EMERGENCY MEDICINE
Payer: MEDICARE

## 2020-06-24 VITALS
OXYGEN SATURATION: 98 % | SYSTOLIC BLOOD PRESSURE: 160 MMHG | HEART RATE: 113 BPM | DIASTOLIC BLOOD PRESSURE: 90 MMHG | RESPIRATION RATE: 15 BRPM | TEMPERATURE: 98 F

## 2020-06-24 DIAGNOSIS — N17.9 AKI (ACUTE KIDNEY INJURY): Primary | ICD-10-CM

## 2020-06-24 DIAGNOSIS — R94.31 ABNORMAL EKG: Primary | ICD-10-CM

## 2020-06-24 DIAGNOSIS — R10.13 EPIGASTRIC PAIN: ICD-10-CM

## 2020-06-24 DIAGNOSIS — R10.9 ABDOMINAL PAIN, UNSPECIFIED ABDOMINAL LOCATION: ICD-10-CM

## 2020-06-24 DIAGNOSIS — E86.0 DEHYDRATION: ICD-10-CM

## 2020-06-24 LAB
ALBUMIN SERPL BCP-MCNC: 4.4 G/DL (ref 3.5–5.2)
ALP SERPL-CCNC: 129 U/L (ref 55–135)
ALT SERPL W/O P-5'-P-CCNC: 13 U/L (ref 10–44)
ANION GAP SERPL CALC-SCNC: 19 MMOL/L (ref 8–16)
AST SERPL-CCNC: 23 U/L (ref 10–40)
BASOPHILS # BLD AUTO: 0.04 K/UL (ref 0–0.2)
BASOPHILS NFR BLD: 0.5 % (ref 0–1.9)
BILIRUB SERPL-MCNC: 0.3 MG/DL (ref 0.1–1)
BILIRUB UR QL STRIP: POSITIVE
BNP SERPL-MCNC: 31 PG/ML (ref 0–99)
BUN SERPL-MCNC: 65 MG/DL (ref 8–23)
CALCIUM SERPL-MCNC: 10 MG/DL (ref 8.7–10.5)
CHLORIDE SERPL-SCNC: 98 MMOL/L (ref 95–110)
CO2 SERPL-SCNC: 19 MMOL/L (ref 23–29)
CREAT SERPL-MCNC: 1.8 MG/DL (ref 0.5–1.4)
DIFFERENTIAL METHOD: ABNORMAL
EOSINOPHIL # BLD AUTO: 0.1 K/UL (ref 0–0.5)
EOSINOPHIL NFR BLD: 0.6 % (ref 0–8)
ERYTHROCYTE [DISTWIDTH] IN BLOOD BY AUTOMATED COUNT: 13.2 % (ref 11.5–14.5)
EST. GFR  (AFRICAN AMERICAN): 31 ML/MIN/1.73 M^2
EST. GFR  (NON AFRICAN AMERICAN): 27 ML/MIN/1.73 M^2
GLUCOSE SERPL-MCNC: 116 MG/DL (ref 70–110)
GLUCOSE UR QL STRIP: NEGATIVE
HCT VFR BLD AUTO: 42.4 % (ref 37–48.5)
HGB BLD-MCNC: 14.3 G/DL (ref 12–16)
IMM GRANULOCYTES # BLD AUTO: 0.02 K/UL (ref 0–0.04)
IMM GRANULOCYTES NFR BLD AUTO: 0.2 % (ref 0–0.5)
KETONES UR QL STRIP: POSITIVE
LEUKOCYTE ESTERASE UR QL STRIP: NEGATIVE
LIPASE SERPL-CCNC: 11 U/L (ref 4–60)
LYMPHOCYTES # BLD AUTO: 1.5 K/UL (ref 1–4.8)
LYMPHOCYTES NFR BLD: 17.9 % (ref 18–48)
MCH RBC QN AUTO: 29.7 PG (ref 27–31)
MCHC RBC AUTO-ENTMCNC: 33.7 G/DL (ref 32–36)
MCV RBC AUTO: 88 FL (ref 82–98)
MONOCYTES # BLD AUTO: 0.6 K/UL (ref 0.3–1)
MONOCYTES NFR BLD: 7.5 % (ref 4–15)
NEUTROPHILS # BLD AUTO: 6.2 K/UL (ref 1.8–7.7)
NEUTROPHILS NFR BLD: 73.3 % (ref 38–73)
NRBC BLD-RTO: 0 /100 WBC
PH, POC UA: 5 (ref 5–8)
PLATELET # BLD AUTO: 265 K/UL (ref 150–350)
PMV BLD AUTO: 8.8 FL (ref 9.2–12.9)
POC BLOOD, URINE: NEGATIVE
POC NITRATES, URINE: NEGATIVE
POTASSIUM SERPL-SCNC: 4.7 MMOL/L (ref 3.5–5.1)
PROT SERPL-MCNC: 8.3 G/DL (ref 6–8.4)
PROT UR QL STRIP: POSITIVE
RBC # BLD AUTO: 4.81 M/UL (ref 4–5.4)
SODIUM SERPL-SCNC: 136 MMOL/L (ref 136–145)
SP GR UR STRIP: 1.02 (ref 1–1.03)
TROPONIN I SERPL DL<=0.01 NG/ML-MCNC: 0.02 NG/ML (ref 0–0.03)
UROBILINOGEN UR STRIP-ACNC: ABNORMAL (ref 0.1–1.1)
WBC # BLD AUTO: 8.45 K/UL (ref 3.9–12.7)

## 2020-06-24 PROCEDURE — 74019 RADEX ABDOMEN 2 VIEWS: CPT | Mod: S$GLB,,, | Performed by: RADIOLOGY

## 2020-06-24 PROCEDURE — 83690 ASSAY OF LIPASE: CPT

## 2020-06-24 PROCEDURE — 63600175 PHARM REV CODE 636 W HCPCS: Performed by: EMERGENCY MEDICINE

## 2020-06-24 PROCEDURE — 81003 POCT URINALYSIS, DIPSTICK, AUTOMATED, W/O SCOPE: ICD-10-PCS | Mod: QW,S$GLB,, | Performed by: NURSE PRACTITIONER

## 2020-06-24 PROCEDURE — 96375 TX/PRO/DX INJ NEW DRUG ADDON: CPT

## 2020-06-24 PROCEDURE — 83880 ASSAY OF NATRIURETIC PEPTIDE: CPT

## 2020-06-24 PROCEDURE — 25000003 PHARM REV CODE 250: Performed by: EMERGENCY MEDICINE

## 2020-06-24 PROCEDURE — 99213 OFFICE O/P EST LOW 20 MIN: CPT | Mod: 25,S$GLB,, | Performed by: NURSE PRACTITIONER

## 2020-06-24 PROCEDURE — 80053 COMPREHEN METABOLIC PANEL: CPT

## 2020-06-24 PROCEDURE — 96361 HYDRATE IV INFUSION ADD-ON: CPT

## 2020-06-24 PROCEDURE — 84484 ASSAY OF TROPONIN QUANT: CPT

## 2020-06-24 PROCEDURE — 99213 PR OFFICE/OUTPT VISIT, EST, LEVL III, 20-29 MIN: ICD-10-PCS | Mod: 25,S$GLB,, | Performed by: NURSE PRACTITIONER

## 2020-06-24 PROCEDURE — 81003 URINALYSIS AUTO W/O SCOPE: CPT | Mod: QW,S$GLB,, | Performed by: NURSE PRACTITIONER

## 2020-06-24 PROCEDURE — 96374 THER/PROPH/DIAG INJ IV PUSH: CPT

## 2020-06-24 PROCEDURE — 96372 THER/PROPH/DIAG INJ SC/IM: CPT | Mod: 59

## 2020-06-24 PROCEDURE — 99285 EMERGENCY DEPT VISIT HI MDM: CPT | Mod: 25

## 2020-06-24 PROCEDURE — 85025 COMPLETE CBC W/AUTO DIFF WBC: CPT

## 2020-06-24 PROCEDURE — 74019 XR ABDOMEN FLAT AND ERECT: ICD-10-PCS | Mod: S$GLB,,, | Performed by: RADIOLOGY

## 2020-06-24 PROCEDURE — S0028 INJECTION, FAMOTIDINE, 20 MG: HCPCS | Performed by: EMERGENCY MEDICINE

## 2020-06-24 RX ORDER — DICYCLOMINE HYDROCHLORIDE 20 MG/1
20 TABLET ORAL
Status: DISCONTINUED | OUTPATIENT
Start: 2020-06-24 | End: 2020-06-24

## 2020-06-24 RX ORDER — FAMOTIDINE 10 MG/ML
20 INJECTION INTRAVENOUS
Status: COMPLETED | OUTPATIENT
Start: 2020-06-24 | End: 2020-06-24

## 2020-06-24 RX ORDER — DICYCLOMINE HYDROCHLORIDE 10 MG/ML
20 INJECTION INTRAMUSCULAR
Status: COMPLETED | OUTPATIENT
Start: 2020-06-24 | End: 2020-06-24

## 2020-06-24 RX ORDER — SODIUM CHLORIDE 9 MG/ML
INJECTION, SOLUTION INTRAVENOUS ONCE
Status: DISCONTINUED | OUTPATIENT
Start: 2020-06-24 | End: 2020-06-24

## 2020-06-24 RX ORDER — ONDANSETRON 2 MG/ML
4 INJECTION INTRAMUSCULAR; INTRAVENOUS
Status: COMPLETED | OUTPATIENT
Start: 2020-06-24 | End: 2020-06-24

## 2020-06-24 RX ORDER — METOCLOPRAMIDE HYDROCHLORIDE 5 MG/ML
10 INJECTION INTRAMUSCULAR; INTRAVENOUS
Status: COMPLETED | OUTPATIENT
Start: 2020-06-24 | End: 2020-06-24

## 2020-06-24 RX ADMIN — SODIUM CHLORIDE 1000 ML: 0.9 INJECTION, SOLUTION INTRAVENOUS at 08:06

## 2020-06-24 RX ADMIN — DICYCLOMINE HYDROCHLORIDE 20 MG: 20 INJECTION, SOLUTION INTRAMUSCULAR at 08:06

## 2020-06-24 RX ADMIN — ONDANSETRON HYDROCHLORIDE 4 MG: 2 SOLUTION INTRAMUSCULAR; INTRAVENOUS at 08:06

## 2020-06-24 RX ADMIN — METOCLOPRAMIDE 10 MG: 5 INJECTION, SOLUTION INTRAMUSCULAR; INTRAVENOUS at 10:06

## 2020-06-24 RX ADMIN — FAMOTIDINE 20 MG: 10 INJECTION INTRAVENOUS at 10:06

## 2020-06-24 NOTE — TELEPHONE ENCOUNTER
----- Message from Marc Cedeño sent at 6/23/2020  4:40 PM CDT -----  Regarding: SELF  Type: Patient Call Back    Who called: SELF    What is the request in detail: PT HAS BEEN HAVING GENERAL WEAKNESS FOR THE PAST WEEK. SHE HAS NAUSEA, VOMITING, FATIGUE NO FEVER OR DIARRHEA    Can the clinic reply by [facility]? NO    Would the patient rather a call back or a response via [facility]? CALL    Best call back number: [phone]

## 2020-06-24 NOTE — PROVIDER PROGRESS NOTES - EMERGENCY DEPT.
Emergency Department TeleTRIAGE Encounter Note      CHIEF COMPLAINT    Chief Complaint   Patient presents with    Abdominal Pain     Pt c/o abdominal pain upper quadurant, weakness, dehydrated being going for a couple days. Pt was seen at the urgent care and was sent her for abnormal ECG and abd pain.        VITAL SIGNS   Initial Vitals [06/24/20 1839]   BP Pulse Resp Temp SpO2   (!) 130/94 (!) 116 17 97.9 °F (36.6 °C) 99 %      MAP       --            ALLERGIES    Review of patient's allergies indicates:   Allergen Reactions    Lunesta [eszopiclone] Other (See Comments)     Sleep walking       PROVIDER TRIAGE NOTE  Patient with past medical history lung cancer, COPD, hypertension, chronic kidney disease presents to the ED for evaluation of intermittent epigastric pain.  Patient states she has had the pain for the past 3 days.  Patient was seen at urgent care and sent to the ED are for abnormal EKG.  Patient denies chest pain or shortness of breath.  She reports mild nausea but denies vomiting or diarrhea.        ORDERS  Labs Reviewed - No data to display    ED Orders (720h ago, onward)    Start Ordered     Status Ordering Provider    06/24/20 2145 06/24/20 1845  Troponin I #2  Once Timed  Collect    Ordered IVA, REN G.    06/24/20 1846 06/24/20 1845  Lipase  STAT  Collect    Ordered IVA, REN G.    06/24/20 1845 06/24/20 1845  Vital signs  Every 15 min      Ordered IVA, REN G.    06/24/20 1845 06/24/20 1845  Cardiac Monitoring - Adult  Continuous     Comments: Notify Physician If:    Ordered IVA, REN G.    06/24/20 1845 06/24/20 1845  Pulse Oximetry Continuous  Continuous      Ordered IVA, REN G.    06/24/20 1845 06/24/20 1845  Diet NPO  Diet effective now      Ordered IVA, REN G.    06/24/20 1845 06/24/20 1845  Saline lock IV  Once      Ordered IVA, REN G.    06/24/20 1845 06/24/20 1845  EKG 12-lead  Once     Comments: Do not perform if previously done during this visit/ triage     Ordered REN ENRIQUE.    06/24/20 1845 06/24/20 1845  CBC auto differential  STAT  Collect    Ordered REN ENRIQUE.    06/24/20 1845 06/24/20 1845  Comprehensive metabolic panel  STAT  Collect    Ordered REN ENRIQUE.    06/24/20 1845 06/24/20 1845  Troponin I #1  STAT  Collect    Ordered REN ENRIQUE.    06/24/20 1845 06/24/20 1845  B-Type natriuretic peptide (BNP)  STAT  Collect    Ordered REN ENRIQUE.    06/24/20 1845 06/24/20 1845  X-Ray Chest AP Portable  1 time imaging      Ordered REN ENRIQUE.            Virtual Visit Note: The provider triage portion of this emergency department evaluation and documentation was performed via [employer], a HIPAA-compliant telemedicine application, in concert with a tele-presenter in the room. A face to face patient evaluation with one of my colleagues will occur once the patient is placed in an emergency department room.      DISCLAIMER: This note was prepared with [employer] voice recognition transcription software. Garbled syntax, mangled pronouns, and other bizarre constructions may be attributed to that software system.

## 2020-06-24 NOTE — PROGRESS NOTES
Subjective:       Patient ID: Ariadna Villegas is a 75 y.o. female.    Vitals:  temperature is 97.6 °F (36.4 °C). Her blood pressure is 160/90 (abnormal) and her pulse is 113 (abnormal). Her respiration is 15 and oxygen saturation is 98%.     Chief Complaint: Abdominal Pain    Pt c/o of abdominal pain since Saturday with vomiting once. She has not taken any medication for relief.     Abdominal Pain  This is a new problem. The current episode started in the past 7 days. The onset quality is sudden. The problem occurs constantly. The problem has been unchanged. The pain is located in the LUQ and RUQ. Pertinent negatives include no arthralgias, diarrhea, dysuria, fever, frequency, headaches, myalgias, nausea or vomiting.       Constitution: Negative for chills, fatigue and fever.   HENT: Negative for congestion and sore throat.    Neck: Negative for painful lymph nodes.   Cardiovascular: Negative for chest pain and leg swelling.   Eyes: Negative for double vision and blurred vision.   Respiratory: Negative for cough and shortness of breath.    Gastrointestinal: Positive for abdominal pain. Negative for nausea, vomiting and diarrhea.   Genitourinary: Negative for dysuria, frequency, urgency and history of kidney stones.   Musculoskeletal: Negative for joint pain, joint swelling, muscle cramps and muscle ache.   Skin: Negative for color change, pale, rash and bruising.   Allergic/Immunologic: Negative for seasonal allergies.   Neurological: Negative for dizziness, history of vertigo, light-headedness, passing out and headaches.   Hematologic/Lymphatic: Negative for swollen lymph nodes.   Psychiatric/Behavioral: Negative for nervous/anxious, sleep disturbance and depression. The patient is not nervous/anxious.        Objective:      Physical Exam   Constitutional: She is oriented to person, place, and time. She appears well-developed.   HENT:   Head: Normocephalic and atraumatic.   Right Ear: External ear normal.   Left  Ear: External ear normal.   Nose: Nose normal.   Mouth/Throat: Mucous membranes are normal.   Eyes: Pupils are equal, round, and reactive to light. Conjunctivae and lids are normal.   Neck: Trachea normal, normal range of motion and full passive range of motion without pain. Neck supple.   Cardiovascular: Normal rate, regular rhythm and normal heart sounds.   Pulmonary/Chest: Effort normal and breath sounds normal. No respiratory distress.   Abdominal: Soft. Normal appearance. She exhibits no distension, no abdominal bruit, no pulsatile midline mass and no mass. Bowel sounds are increased. There is abdominal tenderness in the right upper quadrant and left upper quadrant.   Musculoskeletal: Normal range of motion.   Neurological: She is alert and oriented to person, place, and time. She has normal strength.   Skin: Skin is warm, dry, intact, not diaphoretic and not pale. Capillary refill takes less than 2 seconds.   Psychiatric: Her speech is normal and behavior is normal. Judgment and thought content normal.   Nursing note and vitals reviewed.        Assessment:       1. Abnormal EKG    2. Abdominal pain, unspecified abdominal location        Plan:         Abnormal EKG  -     Refer to Emergency Dept.    Abdominal pain, unspecified abdominal location  -     POCT Urinalysis, Dipstick, Automated, W/O Scope  -     Cancel: POCT Chemistry Panel  -     Discontinue: dicyclomine tablet 20 mg  -     EKG 12-lead; Future  -     XR ABDOMEN FLAT AND ERECT; Future; Expected date: 06/24/2020  -     Orthostatic vital signs  -     Discontinue: 0.9%  NaCl infusion  -     Cancel: COVID-19 Routine Screening      Results for orders placed or performed in visit on 06/24/20   POCT Urinalysis, Dipstick, Automated, W/O Scope   Result Value Ref Range    POC Blood, Urine Negative Negative    POC Bilirubin, Urine Positive (A) Negative    POC Urobilinogen, Urine Norm 0.1 - 1.1    POC Ketones, Urine Positive (A) Negative    POC Protein, Urine  Positive (A) Negative    POC Nitrates, Urine Negative Negative    POC Glucose, Urine Negative Negative    pH, UA 5.0 5 - 8    POC Specific Gravity, Urine 1.025 1.003 - 1.029    POC Leukocytes, Urine Negative Negative

## 2020-06-25 PROBLEM — R10.9 ABDOMINAL PAIN: Status: ACTIVE | Noted: 2020-06-25

## 2020-06-25 PROBLEM — N17.9 AKI (ACUTE KIDNEY INJURY): Status: ACTIVE | Noted: 2020-06-25

## 2020-06-25 PROBLEM — N30.90 CYSTITIS: Status: ACTIVE | Noted: 2020-06-25

## 2020-06-25 PROBLEM — Z72.0 TOBACCO USE: Status: ACTIVE | Noted: 2020-06-25

## 2020-06-25 LAB
ALBUMIN SERPL BCP-MCNC: 3.6 G/DL (ref 3.5–5.2)
ALP SERPL-CCNC: 111 U/L (ref 55–135)
ALT SERPL W/O P-5'-P-CCNC: 9 U/L (ref 10–44)
ANION GAP SERPL CALC-SCNC: 12 MMOL/L (ref 8–16)
AST SERPL-CCNC: 23 U/L (ref 10–40)
BASOPHILS # BLD AUTO: 0.03 K/UL (ref 0–0.2)
BASOPHILS NFR BLD: 0.5 % (ref 0–1.9)
BILIRUB SERPL-MCNC: 0.3 MG/DL (ref 0.1–1)
BILIRUB UR QL STRIP: NEGATIVE
BUN SERPL-MCNC: 51 MG/DL (ref 8–23)
CALCIUM SERPL-MCNC: 8.4 MG/DL (ref 8.7–10.5)
CHLORIDE SERPL-SCNC: 109 MMOL/L (ref 95–110)
CHOLEST SERPL-MCNC: 172 MG/DL (ref 120–199)
CHOLEST/HDLC SERPL: 4.1 {RATIO} (ref 2–5)
CLARITY UR: CLEAR
CO2 SERPL-SCNC: 21 MMOL/L (ref 23–29)
COLOR UR: YELLOW
CREAT SERPL-MCNC: 1.3 MG/DL (ref 0.5–1.4)
DIFFERENTIAL METHOD: ABNORMAL
EOSINOPHIL # BLD AUTO: 0.1 K/UL (ref 0–0.5)
EOSINOPHIL NFR BLD: 1.3 % (ref 0–8)
ERYTHROCYTE [DISTWIDTH] IN BLOOD BY AUTOMATED COUNT: 13.4 % (ref 11.5–14.5)
EST. GFR  (AFRICAN AMERICAN): 46 ML/MIN/1.73 M^2
EST. GFR  (NON AFRICAN AMERICAN): 40 ML/MIN/1.73 M^2
GLUCOSE SERPL-MCNC: 103 MG/DL (ref 70–110)
GLUCOSE UR QL STRIP: NEGATIVE
HCT VFR BLD AUTO: 37 % (ref 37–48.5)
HDLC SERPL-MCNC: 42 MG/DL (ref 40–75)
HDLC SERPL: 24.4 % (ref 20–50)
HGB BLD-MCNC: 11.9 G/DL (ref 12–16)
HGB UR QL STRIP: NEGATIVE
IMM GRANULOCYTES # BLD AUTO: 0.02 K/UL (ref 0–0.04)
IMM GRANULOCYTES NFR BLD AUTO: 0.3 % (ref 0–0.5)
KETONES UR QL STRIP: ABNORMAL
LACTATE SERPL-SCNC: 0.7 MMOL/L (ref 0.5–2.2)
LDLC SERPL CALC-MCNC: 108.2 MG/DL (ref 63–159)
LEUKOCYTE ESTERASE UR QL STRIP: NEGATIVE
LYMPHOCYTES # BLD AUTO: 1.8 K/UL (ref 1–4.8)
LYMPHOCYTES NFR BLD: 28 % (ref 18–48)
MCH RBC QN AUTO: 29.2 PG (ref 27–31)
MCHC RBC AUTO-ENTMCNC: 32.2 G/DL (ref 32–36)
MCV RBC AUTO: 91 FL (ref 82–98)
MONOCYTES # BLD AUTO: 0.5 K/UL (ref 0.3–1)
MONOCYTES NFR BLD: 7.9 % (ref 4–15)
NEUTROPHILS # BLD AUTO: 3.9 K/UL (ref 1.8–7.7)
NEUTROPHILS NFR BLD: 62 % (ref 38–73)
NITRITE UR QL STRIP: NEGATIVE
NONHDLC SERPL-MCNC: 130 MG/DL
NRBC BLD-RTO: 0 /100 WBC
PH UR STRIP: 5 [PH] (ref 5–8)
PLATELET # BLD AUTO: 219 K/UL (ref 150–350)
PMV BLD AUTO: 9.1 FL (ref 9.2–12.9)
POTASSIUM SERPL-SCNC: 4.7 MMOL/L (ref 3.5–5.1)
PROT SERPL-MCNC: 6.9 G/DL (ref 6–8.4)
PROT UR QL STRIP: NEGATIVE
RBC # BLD AUTO: 4.07 M/UL (ref 4–5.4)
SARS-COV-2 RDRP RESP QL NAA+PROBE: NEGATIVE
SODIUM SERPL-SCNC: 142 MMOL/L (ref 136–145)
SP GR UR STRIP: 1.01 (ref 1–1.03)
TRIGL SERPL-MCNC: 109 MG/DL (ref 30–150)
TROPONIN I SERPL DL<=0.01 NG/ML-MCNC: 0.02 NG/ML (ref 0–0.03)
URN SPEC COLLECT METH UR: ABNORMAL
UROBILINOGEN UR STRIP-ACNC: NEGATIVE EU/DL
WBC # BLD AUTO: 6.33 K/UL (ref 3.9–12.7)

## 2020-06-25 PROCEDURE — 63600175 PHARM REV CODE 636 W HCPCS: Performed by: NURSE PRACTITIONER

## 2020-06-25 PROCEDURE — 36415 COLL VENOUS BLD VENIPUNCTURE: CPT

## 2020-06-25 PROCEDURE — 96361 HYDRATE IV INFUSION ADD-ON: CPT | Performed by: EMERGENCY MEDICINE

## 2020-06-25 PROCEDURE — U0002 COVID-19 LAB TEST NON-CDC: HCPCS

## 2020-06-25 PROCEDURE — 25000242 PHARM REV CODE 250 ALT 637 W/ HCPCS: Performed by: NURSE PRACTITIONER

## 2020-06-25 PROCEDURE — 96376 TX/PRO/DX INJ SAME DRUG ADON: CPT | Mod: 59 | Performed by: EMERGENCY MEDICINE

## 2020-06-25 PROCEDURE — 63600175 PHARM REV CODE 636 W HCPCS: Performed by: EMERGENCY MEDICINE

## 2020-06-25 PROCEDURE — 25000003 PHARM REV CODE 250: Performed by: NURSE PRACTITIONER

## 2020-06-25 PROCEDURE — 94640 AIRWAY INHALATION TREATMENT: CPT

## 2020-06-25 PROCEDURE — 96375 TX/PRO/DX INJ NEW DRUG ADDON: CPT | Mod: 59 | Performed by: EMERGENCY MEDICINE

## 2020-06-25 PROCEDURE — 80061 LIPID PANEL: CPT

## 2020-06-25 PROCEDURE — 96375 TX/PRO/DX INJ NEW DRUG ADDON: CPT

## 2020-06-25 PROCEDURE — 94761 N-INVAS EAR/PLS OXIMETRY MLT: CPT

## 2020-06-25 PROCEDURE — G0378 HOSPITAL OBSERVATION PER HR: HCPCS

## 2020-06-25 PROCEDURE — 25000003 PHARM REV CODE 250: Performed by: EMERGENCY MEDICINE

## 2020-06-25 PROCEDURE — 81003 URINALYSIS AUTO W/O SCOPE: CPT

## 2020-06-25 PROCEDURE — 94760 N-INVAS EAR/PLS OXIMETRY 1: CPT

## 2020-06-25 PROCEDURE — 84484 ASSAY OF TROPONIN QUANT: CPT

## 2020-06-25 PROCEDURE — 80053 COMPREHEN METABOLIC PANEL: CPT

## 2020-06-25 PROCEDURE — 63600175 PHARM REV CODE 636 W HCPCS: Performed by: PHYSICIAN ASSISTANT

## 2020-06-25 PROCEDURE — 85025 COMPLETE CBC W/AUTO DIFF WBC: CPT

## 2020-06-25 PROCEDURE — 83605 ASSAY OF LACTIC ACID: CPT

## 2020-06-25 RX ORDER — ONDANSETRON 2 MG/ML
4 INJECTION INTRAMUSCULAR; INTRAVENOUS EVERY 8 HOURS PRN
Status: DISCONTINUED | OUTPATIENT
Start: 2020-06-25 | End: 2020-06-26 | Stop reason: HOSPADM

## 2020-06-25 RX ORDER — MORPHINE SULFATE 10 MG/ML
4 INJECTION INTRAMUSCULAR; INTRAVENOUS; SUBCUTANEOUS
Status: COMPLETED | OUTPATIENT
Start: 2020-06-25 | End: 2020-06-25

## 2020-06-25 RX ORDER — MORPHINE SULFATE 10 MG/ML
1.5 INJECTION INTRAMUSCULAR; INTRAVENOUS; SUBCUTANEOUS ONCE
Status: COMPLETED | OUTPATIENT
Start: 2020-06-25 | End: 2020-06-25

## 2020-06-25 RX ORDER — ROSUVASTATIN CALCIUM 10 MG/1
20 TABLET, COATED ORAL DAILY
Status: DISCONTINUED | OUTPATIENT
Start: 2020-06-25 | End: 2020-06-26 | Stop reason: HOSPADM

## 2020-06-25 RX ORDER — ASPIRIN 81 MG/1
81 TABLET ORAL DAILY
Status: DISCONTINUED | OUTPATIENT
Start: 2020-06-25 | End: 2020-06-25

## 2020-06-25 RX ORDER — MORPHINE SULFATE 10 MG/ML
1 INJECTION INTRAMUSCULAR; INTRAVENOUS; SUBCUTANEOUS EVERY 6 HOURS PRN
Status: DISCONTINUED | OUTPATIENT
Start: 2020-06-25 | End: 2020-06-26 | Stop reason: HOSPADM

## 2020-06-25 RX ORDER — TIOTROPIUM BROMIDE 18 UG/1
18 CAPSULE ORAL; RESPIRATORY (INHALATION) DAILY
Status: DISCONTINUED | OUTPATIENT
Start: 2020-06-25 | End: 2020-06-26 | Stop reason: HOSPADM

## 2020-06-25 RX ORDER — METOPROLOL SUCCINATE 50 MG/1
50 TABLET, EXTENDED RELEASE ORAL DAILY
Status: DISCONTINUED | OUTPATIENT
Start: 2020-06-25 | End: 2020-06-26 | Stop reason: HOSPADM

## 2020-06-25 RX ORDER — ONDANSETRON 2 MG/ML
4 INJECTION INTRAMUSCULAR; INTRAVENOUS EVERY 8 HOURS PRN
Status: DISCONTINUED | OUTPATIENT
Start: 2020-06-25 | End: 2020-06-25

## 2020-06-25 RX ORDER — MORPHINE SULFATE 10 MG/ML
2 INJECTION INTRAMUSCULAR; INTRAVENOUS; SUBCUTANEOUS EVERY 4 HOURS PRN
Status: DISCONTINUED | OUTPATIENT
Start: 2020-06-25 | End: 2020-06-25

## 2020-06-25 RX ORDER — SODIUM CHLORIDE, SODIUM LACTATE, POTASSIUM CHLORIDE, CALCIUM CHLORIDE 600; 310; 30; 20 MG/100ML; MG/100ML; MG/100ML; MG/100ML
INJECTION, SOLUTION INTRAVENOUS CONTINUOUS
Status: DISCONTINUED | OUTPATIENT
Start: 2020-06-25 | End: 2020-06-26 | Stop reason: HOSPADM

## 2020-06-25 RX ORDER — HYDRALAZINE HYDROCHLORIDE 20 MG/ML
10 INJECTION INTRAMUSCULAR; INTRAVENOUS EVERY 8 HOURS PRN
Status: DISCONTINUED | OUTPATIENT
Start: 2020-06-25 | End: 2020-06-26 | Stop reason: HOSPADM

## 2020-06-25 RX ORDER — SODIUM CHLORIDE 0.9 % (FLUSH) 0.9 %
10 SYRINGE (ML) INJECTION
Status: DISCONTINUED | OUTPATIENT
Start: 2020-06-25 | End: 2020-06-26 | Stop reason: HOSPADM

## 2020-06-25 RX ORDER — GABAPENTIN 300 MG/1
300 CAPSULE ORAL 3 TIMES DAILY
Status: DISCONTINUED | OUTPATIENT
Start: 2020-06-25 | End: 2020-06-26 | Stop reason: HOSPADM

## 2020-06-25 RX ORDER — ALBUTEROL SULFATE 90 UG/1
2 AEROSOL, METERED RESPIRATORY (INHALATION) EVERY 4 HOURS PRN
Status: DISCONTINUED | OUTPATIENT
Start: 2020-06-25 | End: 2020-06-26 | Stop reason: HOSPADM

## 2020-06-25 RX ORDER — DICYCLOMINE HYDROCHLORIDE 10 MG/1
10 CAPSULE ORAL 3 TIMES DAILY
Status: DISCONTINUED | OUTPATIENT
Start: 2020-06-25 | End: 2020-06-26 | Stop reason: HOSPADM

## 2020-06-25 RX ADMIN — HYDRALAZINE HYDROCHLORIDE 10 MG: 20 INJECTION INTRAMUSCULAR; INTRAVENOUS at 06:06

## 2020-06-25 RX ADMIN — DICYCLOMINE HYDROCHLORIDE 10 MG: 10 CAPSULE ORAL at 08:06

## 2020-06-25 RX ADMIN — GABAPENTIN 300 MG: 300 CAPSULE ORAL at 08:06

## 2020-06-25 RX ADMIN — METOPROLOL SUCCINATE 50 MG: 50 TABLET, FILM COATED, EXTENDED RELEASE ORAL at 08:06

## 2020-06-25 RX ADMIN — ROSUVASTATIN CALCIUM 20 MG: 10 TABLET, COATED ORAL at 08:06

## 2020-06-25 RX ADMIN — SODIUM CHLORIDE, SODIUM LACTATE, POTASSIUM CHLORIDE, AND CALCIUM CHLORIDE: .6; .31; .03; .02 INJECTION, SOLUTION INTRAVENOUS at 05:06

## 2020-06-25 RX ADMIN — MORPHINE SULFATE 1.5 MG: 10 INJECTION INTRAVENOUS at 08:06

## 2020-06-25 RX ADMIN — TIOTROPIUM BROMIDE 18 MCG: 18 CAPSULE ORAL; RESPIRATORY (INHALATION) at 09:06

## 2020-06-25 RX ADMIN — MORPHINE SULFATE 4 MG: 10 INJECTION INTRAVENOUS at 01:06

## 2020-06-25 RX ADMIN — SODIUM CHLORIDE, SODIUM LACTATE, POTASSIUM CHLORIDE, AND CALCIUM CHLORIDE: .6; .31; .03; .02 INJECTION, SOLUTION INTRAVENOUS at 11:06

## 2020-06-25 RX ADMIN — MORPHINE SULFATE 4 MG: 10 INJECTION INTRAVENOUS at 03:06

## 2020-06-25 RX ADMIN — GABAPENTIN 300 MG: 300 CAPSULE ORAL at 03:06

## 2020-06-25 RX ADMIN — MORPHINE SULFATE 2 MG: 10 INJECTION INTRAVENOUS at 08:06

## 2020-06-25 RX ADMIN — SODIUM CHLORIDE 1000 ML: 0.9 INJECTION, SOLUTION INTRAVENOUS at 01:06

## 2020-06-25 NOTE — PLAN OF CARE
06/25/20 1022   Discharge Assessment   Assessment Type Discharge Planning Assessment   Confirmed/corrected address and phone number on facesheet? Yes   Assessment information obtained from? Patient;Medical Record   Expected Length of Stay (days) 1   Communicated expected length of stay with patient/caregiver yes   Prior to hospitilization cognitive status: Alert/Oriented   Prior to hospitalization functional status: Independent   Current cognitive status: Alert/Oriented   Current Functional Status: Independent   Facility Arrived From: home   Lives With other (see comments)   Able to Return to Prior Arrangements yes   Is patient able to care for self after discharge? Yes   Who are your caregiver(s) and their phone number(s)? ky Corbin [phone]   Patient's perception of discharge disposition home or selfcare   Readmission Within the Last 30 Days no previous admission in last 30 days   Patient currently being followed by outpatient case management? No   Patient currently receives any other outside agency services? No   Equipment Currently Used at Home none   Do you have any problems affording any of your prescribed medications? No   Is the patient taking medications as prescribed? yes   Does the patient have transportation home? Yes   Transportation Anticipated family or friend will provide   Does the patient receive services at the [facility]? No   Discharge Plan A Home with family  (with follow up)   DME Needed Upon Discharge  none   Patient/Family in Agreement with Plan yes     [id number]  [place] [address] AT [facility] OF [address] [address]  Phone: [phone] Fax: [phone]    [id number] - [place] - [address] AT [facility] OF [address]  [address] [address]  Phone: [phone] Fax: [phone]

## 2020-06-25 NOTE — CARE UPDATE
I reviewed and agree with Ritu De Luna NP HP. Mrs. Villegas placed observation for abdominal pain with one episode of bilious material on Sat (6 days ago) found on CT/US to mild prominence extrahepatic common duct with relative diminished calibar at the level of the pancreatic head.   Patient initially went to Urgent care for abdominal pain but was sent to hospital for abnormal EKG. EKG  TWI inferior leads, troponin neg x 2 and normal BNP. Denies chest pain or shortness of breath. 9/7/2019 NST no evidence of ischemia.   Afebrile, no leukocytosis, LFT's/ lipase/lactic acid normal. No hypoglycemia. TTP across upper abd more so LUQ.   Denies recent weight lost besides recently since Friday due current pain  10/30/18 cscope multiple polyps (not biospy; follow up in 3 yrs). Hx of RUL SCC sp Wedge resection (followed by hem/Onc Dr. Song-last seen in Jan 2020)  Possible passed stone (no history of gallstones) as 6 days ago when symptoms started? Neoplasm is also on differential given history lung cancer still smoking.   Keep NPO and follow up GI recs -ERCP?     Cookie De Luna NP  Staff: Tiffani Walter MD

## 2020-06-25 NOTE — H&P
"[facility] Medicine  History & Physical    Patient Name: Ariadna Villegas  MRN: [id number]  Admission Date: 6/24/2020  Attending Physician: Tiffani Kilgore MD   Primary Care Provider: Kirti Morris MD         Patient information was obtained from patient and ER records.     Subjective:     Principal Problem:Abdominal pain    Chief Complaint:   Chief Complaint   Patient presents with    Abdominal Pain     Pt c/o abdominal pain upper quadurant, weakness, dehydrated being going for a couple days. Pt was seen at the urgent care and was sent her for abnormal ECG and abd pain.         HPI: This is a 75 y.o female with medical history of HTN, non-small cell lung cancer s/p Wedge resection 2019, COPD and smoking who presents to the hospital complaining of acute onset of periumbilical abdominal pain x 5 days with associated symptoms of decreased appetite and fatigue. Patient reports her abdominal pain started last Friday 6/19/2020. She describes pain like "hurt" and around her umbilical area with intensity of 7-8/10. Pain comes and goes, and is worse with eating/drinking. Patient reports pain is relieved if she doesn't eat or drink. She doesn't eat or drink well x5 days due to pain and decreased appetite. She doesn't try anything for pain at home. She reports she tried to see her PCP but was not able to schedule her appointment. She went to an UC today 6/24/2020 and was sent to the ED from urgent care for an abnormal EKG. Her last normal BM was this AM. She does smoke cigarettes, around 7-10 cig/day. She is not currently receiving chemotherapy. She denies fever, chills, emesis, constipations, diarrhea, dysuria, difficulty urinating, chest pain, SOB, leg swelling, rash or any other associated symptoms. She does not consume EtOH or use illicit drugs.    In ED, COVID negative. Chest xray stable chronic  emphysematous change with minimal interstitial edema. Abdominal x ray with nonobstructive bowel " gas pattern.     CT abdomen pelvis concerns extrahepatic common duct edema/inflmation, sludged gallbladder and/or cholelithiasis, and UTI/cystitis.     US Abdomen with no cholelithiasis or acute cholecystitis, but warranted ERCP for further evaluation of extrahepatic common duct.     Labs with worsening renal function with elevated sCr 1.8, BUN 65. Bicarb is 19 with anion gap 19. BNP and trop negative. UA no infection with positive for bilirubin/ketone/protein.    Patient is placed in Observation with hospital medicine for further evaluation and treatment.        Past Medical History:   Diagnosis Date    Arthritis     neck and back    COPD (chronic obstructive pulmonary disease)     Malignant hypertension     Non-small cell lung cancer 2018    Osteoporosis, postmenopausal     Tobacco use disorder        Past Surgical History:   Procedure Laterality Date     x3      HYSTERECTOMY      LUNG BIOPSY N/A 2018    Procedure: Biopsy-Lung;  Surgeon: Jesus Diagnostic Provider;  Location: [facility] OR [address] [place] [address];  Service: Radiology;  Laterality: N/A;    LUNG CANCER SURGERY Right 2019    PARTIAL HYSTERECTOMY      , after her third     SALPINGOOPHORECTOMY      , for a ovarian cyst    THORACOSCOPIC WEDGE RESECTION OF LUNG Right 2019    Procedure: VATS, WITH WEDGE RESECTION, LUNG;  Surgeon: Celso Jewell MD;  Location: [facility] OR [address] [place] [address];  Service: Thoracic;  Laterality: Right;       Review of patient's allergies indicates:   Allergen Reactions    Lunesta [eszopiclone] Other (See Comments)     Sleep walking       Current Facility-Administered Medications on File Prior to Encounter   Medication    [DISCONTINUED] 0.9%  NaCl infusion    [DISCONTINUED] dicyclomine tablet 20 mg     Current Outpatient Medications on File Prior to Encounter   Medication Sig    aspirin (ECOTRIN) 81 MG EC tablet Take 1 tablet (81 mg total) by mouth once daily.    azelastine (ASTELIN) 137 mcg (0.1 %)  nasal spray 1 spray (137 mcg total) by Nasal route 2 (two) times daily.    gabapentin (NEURONTIN) 300 MG capsule Take 300 mg by mouth 3 (three) times daily.     hydrOXYzine HCL (ATARAX) 25 MG tablet Take 1 tablet (25 mg total) by mouth every evening.    losartan-hydrochlorothiazide 100-25 mg (HYZAAR) 100-25 mg per tablet Take 1 tablet by mouth once daily.    metoprolol succinate (TOPROL-XL) 50 MG 24 hr tablet Take 1 tablet (50 mg total) by mouth once daily.    rosuvastatin (CRESTOR) 20 MG tablet Take 1 tablet (20 mg total) by mouth once daily.    sertraline (ZOLOFT) 50 MG tablet Take 1 tablet (50 mg total) by mouth once daily.    tiotropium (SPIRIVA) 18 mcg inhalation capsule Inhale 1 capsule (18 mcg total) into the lungs once daily. Controller    albuterol (PROVENTIL/VENTOLIN HFA) 90 mcg/actuation inhaler INHALE 2 PUFFS INTO THE LUNGS EVERY 6 HOURS AS NEEDED FOR WHEEZING    methylPREDNISolone (MEDROL DOSEPACK) 4 mg tablet use as directed (Patient not taking: Reported on 5/28/2020)    oxyCODONE (ROXICODONE) 5 MG immediate release tablet TK 1 T PO Q 8 H PRN FOR PAIN    triamcinolone acetonide 0.1% (KENALOG) 0.1 % cream Apply topically 2 (two) times daily.     Family History     Problem Relation (Age of Onset)    Diabetes Mother    Heart disease Mother, Sister    Stroke Father        Tobacco Use    Smoking status: Current Every Day Smoker     Packs/day: 0.15     Years: 60.00     Pack years: 9.00     Types: Cigarettes    Smokeless tobacco: Never Used    Tobacco comment: quit x 2-3 wks. ago   Substance and Sexual Activity    Alcohol use: Yes     Comment: socially    Drug use: No    Sexual activity: Never     Partners: Male     Comment:      Review of Systems   Constitutional: Positive for appetite change and fatigue. Negative for chills, diaphoresis and fever.   HENT: Negative for congestion and sore throat.    Respiratory: Negative for cough, chest tightness, shortness of breath and wheezing.     Cardiovascular: Negative for chest pain, palpitations and leg swelling.   Gastrointestinal: Positive for abdominal pain. Negative for blood in stool, constipation, diarrhea, nausea and vomiting.   Genitourinary: Negative for dysuria, flank pain and hematuria.   Musculoskeletal: Negative for arthralgias, joint swelling and myalgias.   Neurological: Negative for tremors, seizures and headaches.   Psychiatric/Behavioral: Negative for confusion.     Objective:     Vital Signs (Most Recent):  Temp: 97.7 °F (36.5 °C) (06/25/20 0311)  Pulse: 90 (06/25/20 0400)  Resp: 15 (06/25/20 0400)  BP: 130/79 (06/25/20 0338)  SpO2: 99 % (06/25/20 0400) Vital Signs (24h Range):  Temp:  [97.6 °F (36.4 °C)-97.9 °F (36.6 °C)] 97.7 °F (36.5 °C)  Pulse:  [] 90  Resp:  [15-23] 15  SpO2:  [95 %-99 %] 99 %  BP: (128-179)/(58-96) 130/79     Weight: 38.6 kg (85 lb)  Body mass index is 16.6 kg/m².    Physical Exam  Constitutional:       Appearance: She is underweight. She is not toxic-appearing.   HENT:      Head: Normocephalic and atraumatic.      Nose: No congestion or rhinorrhea.      Mouth/Throat:      Mouth: Mucous membranes are dry.   Eyes:      Extraocular Movements: Extraocular movements intact.      Pupils: Pupils are equal, round, and reactive to light.   Neck:      Musculoskeletal: Normal range of motion and neck supple.   Cardiovascular:      Rate and Rhythm: Normal rate and regular rhythm.      Pulses: Normal pulses.      Heart sounds: Normal heart sounds.   Pulmonary:      Effort: Pulmonary effort is normal.      Breath sounds: Normal breath sounds. No wheezing or rales.   Chest:      Chest wall: No tenderness.   Abdominal:      General: Bowel sounds are normal. There is no distension.      Palpations: Abdomen is soft.      Tenderness: There is abdominal tenderness (generalized TTP periumbilical area). There is no right CVA tenderness, left CVA tenderness, guarding or rebound.   Musculoskeletal: Normal range of motion.          General: No swelling or tenderness.      Right lower leg: No edema.      Left lower leg: No edema.   Skin:     General: Skin is warm and dry.      Capillary Refill: Capillary refill takes 2 to 3 seconds.      Findings: No rash.   Neurological:      General: No focal deficit present.      Mental Status: She is alert and oriented to person, place, and time.   Psychiatric:         Mood and Affect: Mood normal.         Thought Content: Thought content normal.           CRANIAL NERVES     CN III, IV, VI   Pupils are equal, round, and reactive to light.       Significant Labs:   Bilirubin:   Recent Labs   Lab 06/24/20 2037   BILITOT 0.3     CBC:   Recent Labs   Lab 06/24/20 2037   WBC 8.45   HGB 14.3   HCT 42.4        CMP:   Recent Labs   Lab 06/24/20 2037      K 4.7   CL 98   CO2 19*   *   BUN 65*   CREATININE 1.8*   CALCIUM 10.0   PROT 8.3   ALBUMIN 4.4   BILITOT 0.3   ALKPHOS 129   AST 23   ALT 13   ANIONGAP 19*   EGFRNONAA 27*     Cardiac Markers:   Recent Labs   Lab 06/24/20 2037   BNP 31     Lactic Acid: No results for input(s): LACTATE in the last 48 hours.  Lipase:   Recent Labs   Lab 06/24/20 2037   LIPASE 11     POCT Glucose: No results for input(s): POCTGLUCOSE in the last 48 hours.  Troponin:   Recent Labs   Lab 06/24/20 2037 06/25/20  0034   TROPONINI 0.024 0.022     Urine Studies:   Recent Labs   Lab 06/25/20  0047   COLORU Yellow   APPEARANCEUA Clear   PHUR 5.0   SPECGRAV 1.015   PROTEINUA Negative   GLUCUA Negative   KETONESU 1+*   BILIRUBINUA Negative   OCCULTUA Negative   NITRITE Negative   UROBILINOGEN Negative   LEUKOCYTESUR Negative       Significant Imaging: I have reviewed and interpreted all pertinent imaging results/findings within the past 24 hours.    Assessment/Plan:     * Abdominal pain  C/o periumbilical abdominal pain x 5 days. Worse with eating/drinking. Denies N/V. Normal BM. Liver enzymes/lipase normal. Afebrile. No leukocytosis. UA with positive  bilirubin/ketones/protein. CT abdomen pelvis concerns extrahepatic common duct edema/inflmation, sludged gallbladder and/or cholelithiasis, and UTI/cystitis.  US Abdomen with no cholelithiasis or acute cholecystitis, but warranted ERCP for further evaluation of extrahepatic common duct.    - NPO  - IVF  - Pain control  - GI consult      FERNANDO (acute kidney injury)  sCr 1.8 and BUN 65. Worse than baseline. Probably from decreased po intake/dehydration.    - Given IVF bolus in ED  - Continue IVF  - Recheck lab in AM  - Avoid nephrotoxins. Renal dose all meds         CKD (chronic kidney disease) stage 3, GFR 30-59 ml/min  sCr/GFR worse than baseline. Previous GFR 37 on 1/15/2020. Today GFR 27. Probably from decreased po intake/dehydration/FERNANDO  - See above  - Will renal dose meds, avoid nephrotoxins and repeat labs in AM      Cystitis  Afebrile. No leukocytosis. Denies dysuria. UA no infection.  CT concern UTI/cystitis with bladder wall thickening    - Continue to monitor.      Essential hypertension  Controlled. Home meds with Metoprolol and Hyzaar.    - Hold Hyzaar due to worsening renal function  - Continue Metoprolol  - Hydralazine PRN      COPD (chronic obstructive pulmonary disease)  Stable. No wheezing on exam. Denies SOB. Comfortable in RA.  Still active smoking. Continue home Spiriva.  PRN duo-nebs prn      Squamous cell carcinoma of right lung  Dx in 2018. Had wedge resection procedure in 2019. Not on any chemotherapy. Stable. F/u with Hematology and Oncology with Dr. Song. Still active smoking      Centrilobular emphysema  Stable. Chest xray shows stable chronic emphysematous changes. Still active smoking.      Hyperlipidemia  Lab Results   Component Value Date    LDLCALC 72.4 09/16/2019     Lipid panel pending.  - Continue Crestor      Tobacco use  Active smoking 7-10 cigs/day. Not ready to quit. Will offer Nicotine patch while stay as needed        VTE Risk Mitigation (From admission, onward)          Ordered     Place BERTO hose  Until discontinued      06/25/20 0458     IP VTE HIGH RISK PATIENT  Once      06/25/20 0446     Place sequential compression device  Until discontinued      06/25/20 0446                   Ritu De Luna NP  Department of Hospital Medicine   [facility]

## 2020-06-25 NOTE — ASSESSMENT & PLAN NOTE
Controlled. Home meds with Metoprolol and Hyzaar.    - Hold Hyzaar due to worsening renal function  - Continue Metoprolol  - Hydralazine PRN

## 2020-06-25 NOTE — ASSESSMENT & PLAN NOTE
C/o periumbilical abdominal pain x 5 days. Worse with eating/drinking. Denies N/V. Normal BM. Liver enzymes/lipase normal. Afebrile. No leukocytosis. UA with positive bilirubin/ketones/protein. CT abdomen pelvis concerns extrahepatic common duct edema/inflmation, sludged gallbladder and/or cholelithiasis, and UTI/cystitis.  US Abdomen with no cholelithiasis or acute cholecystitis, but warranted ERCP for further evaluation of extrahepatic common duct.    - NPO  - IVF  - Pain control  - GI consult

## 2020-06-25 NOTE — ASSESSMENT & PLAN NOTE
Stable. No wheezing on exam. Denies SOB. Comfortable in RA.  Still active smoking. Continue home Spiriva.  PRN duo-nebs prn

## 2020-06-25 NOTE — PROGRESS NOTES
Abdominal Pain    Abdominal pain is pain in the stomach or belly area. Everyone has this pain from time to time. In many cases it goes away on its own. But abdominal pain can sometimes be due to a serious problem, such as appendicitis. So its important to know when to seek help.  Causes of abdominal pain  There are many possible causes of abdominal pain. Common causes in adults include:  · Constipation, diarrhea, or gas  · Stomach acid flowing back up into the esophagus (acid reflux or heartburn)  · Severe acid reflux, called GERD (gastroesophageal reflux disease)  · A sore in the lining of the stomach or small intestine (peptic ulcer)  · Inflammation of the gallbladder, liver, or pancreas  · Gallstones or kidney stones  · Appendicitis   · Intestinal blockage   · An internal organ pushing through a muscle or other tissue (hernia)  · Urinary tract infections  · In women, menstrual cramps, fibroids, or endometriosis  · Inflammation or infection of the intestines  Diagnosing the cause of abdominal pain  Your healthcare provider will do a physical exam help find the cause of your pain. If needed, tests will be ordered. Belly pain has many possible causes. So it can be hard to find the reason for your pain. Giving details about your pain can help. Tell your provider where and when you feel the pain, and what makes it better or worse. Also let your provider know if you have other symptoms such as:  · Fever  · Tiredness  · Upset stomach (nausea)  · Vomiting  · Changes in bathroom habits  Treating abdominal pain  Some causes of pain need emergency medical treatment right away. These include appendicitis or a bowel blockage. Other problems can be treated with rest, fluids, or medicines. Your healthcare provider can give you specific instructions for treatment or self-care based on what is causing your pain.  If you have vomiting or diarrhea, sip water or other clear fluids. When you are ready to eat solid foods again, start  with small amounts of easy-to-digest, low-fat foods. These include apple sauce, toast, or crackers.   When to seek medical care  Call 911 or go to the hospital right away if you:  · Cant pass stool and are vomiting  · Are vomiting blood or have bloody diarrhea or black, tarry diarrhea  · Have chest, neck, or shoulder pain  · Feel like you might pass out  · Have pain in your shoulder blades with nausea  · Have sudden, severe belly pain  · Have new, severe pain unlike any you have felt before  · Have a belly that is rigid, hard, and tender to touch  Call your healthcare provider if you have:  · Pain for more than 5 days  · Bloating for more than 2 days  · Diarrhea for more than 5 days  · A fever of 100.4°F (38.0°C) or higher, or as directed by your provider  · Pain that gets worse  · Weight loss for no reason  · Continued lack of appetite  · Blood in your stool  How to prevent abdominal pain  Here are some tips to help prevent abdominal pain:  · Eat smaller amounts of food at one time.  · Avoid greasy, fried, or other high-fat foods.  · Avoid foods that give you gas.  · Exercise regularly.  · Drink plenty of fluids.  To help prevent GERD symptoms:  · Quit smoking.  · Reduce alcohol and certain foods that increase stomach acid.  · Avoid aspirin and over-the-counter pain and fever medicines (NSAIDS or nonsteroidal anti-inflammatory drugs), if possible  · Lose extra weight.  · Finish eating at least 2 hours before you go to bed or lie down.  · Raise the head of your bed.

## 2020-06-25 NOTE — ASSESSMENT & PLAN NOTE
Dx in 2018. Had wedge resection procedure in 2019. Not on any chemotherapy. Stable. F/u with Hematology and Oncology with Dr. Song. Still active smoking

## 2020-06-25 NOTE — PROGRESS NOTES
Report received from  Amna SANCHEZ  Pt received to room # 323 via stretcher per transport. Telemetry monitor applied. NAD noted. Respirations even and unlabored. Oriented pt to room. Safety maintained, Call light within reach. Admit navigator completed. Updated pt with plan of care, pt verbalized understanding

## 2020-06-25 NOTE — ED TRIAGE NOTES
Pt arrived to ED with c/o Abdominal Pain,  Weakness since Friday but worsen on today. Pt was seen at the urgent care and was sent her for abnormal ECG and ad pain. ). Denies SOB, chest pain.

## 2020-06-25 NOTE — SUBJECTIVE & OBJECTIVE
Past Medical History:   Diagnosis Date    Arthritis     neck and back    COPD (chronic obstructive pulmonary disease)     Malignant hypertension     Non-small cell lung cancer 2018    Osteoporosis, postmenopausal     Tobacco use disorder        Past Surgical History:   Procedure Laterality Date     x3      HYSTERECTOMY      LUNG BIOPSY N/A 2018    Procedure: Biopsy-Lung;  Surgeon: Jesus Diagnostic Provider;  Location: [facility] OR [address] [place] [address];  Service: Radiology;  Laterality: N/A;    LUNG CANCER SURGERY Right 2019    PARTIAL HYSTERECTOMY      , after her third     SALPINGOOPHORECTOMY      , for a ovarian cyst    THORACOSCOPIC WEDGE RESECTION OF LUNG Right 2019    Procedure: VATS, WITH WEDGE RESECTION, LUNG;  Surgeon: Celso Jewell MD;  Location: [facility] OR [address] [place] [address];  Service: Thoracic;  Laterality: Right;       Review of patient's allergies indicates:   Allergen Reactions    Lunesta [eszopiclone] Other (See Comments)     Sleep walking       Current Facility-Administered Medications on File Prior to Encounter   Medication    [DISCONTINUED] 0.9%  NaCl infusion    [DISCONTINUED] dicyclomine tablet 20 mg     Current Outpatient Medications on File Prior to Encounter   Medication Sig    aspirin (ECOTRIN) 81 MG EC tablet Take 1 tablet (81 mg total) by mouth once daily.    azelastine (ASTELIN) 137 mcg (0.1 %) nasal spray 1 spray (137 mcg total) by Nasal route 2 (two) times daily.    gabapentin (NEURONTIN) 300 MG capsule Take 300 mg by mouth 3 (three) times daily.     hydrOXYzine HCL (ATARAX) 25 MG tablet Take 1 tablet (25 mg total) by mouth every evening.    losartan-hydrochlorothiazide 100-25 mg (HYZAAR) 100-25 mg per tablet Take 1 tablet by mouth once daily.    metoprolol succinate (TOPROL-XL) 50 MG 24 hr tablet Take 1 tablet (50 mg total) by mouth once daily.    rosuvastatin (CRESTOR) 20 MG tablet Take 1 tablet (20 mg total) by mouth once daily.    sertraline  (ZOLOFT) 50 MG tablet Take 1 tablet (50 mg total) by mouth once daily.    tiotropium (SPIRIVA) 18 mcg inhalation capsule Inhale 1 capsule (18 mcg total) into the lungs once daily. Controller    albuterol (PROVENTIL/VENTOLIN HFA) 90 mcg/actuation inhaler INHALE 2 PUFFS INTO THE LUNGS EVERY 6 HOURS AS NEEDED FOR WHEEZING    methylPREDNISolone (MEDROL DOSEPACK) 4 mg tablet use as directed (Patient not taking: Reported on 5/28/2020)    oxyCODONE (ROXICODONE) 5 MG immediate release tablet TK 1 T PO Q 8 H PRN FOR PAIN    triamcinolone acetonide 0.1% (KENALOG) 0.1 % cream Apply topically 2 (two) times daily.     Family History     Problem Relation (Age of Onset)    Diabetes Mother    Heart disease Mother, Sister    Stroke Father        Tobacco Use    Smoking status: Current Every Day Smoker     Packs/day: 0.15     Years: 60.00     Pack years: 9.00     Types: Cigarettes    Smokeless tobacco: Never Used    Tobacco comment: quit x 2-3 wks. ago   Substance and Sexual Activity    Alcohol use: Yes     Comment: socially    Drug use: No    Sexual activity: Never     Partners: Male     Comment:      Review of Systems   Constitutional: Positive for appetite change and fatigue. Negative for chills, diaphoresis and fever.   HENT: Negative for congestion and sore throat.    Respiratory: Negative for cough, chest tightness, shortness of breath and wheezing.    Cardiovascular: Negative for chest pain, palpitations and leg swelling.   Gastrointestinal: Positive for abdominal pain. Negative for blood in stool, constipation, diarrhea, nausea and vomiting.   Genitourinary: Negative for dysuria, flank pain and hematuria.   Musculoskeletal: Negative for arthralgias, joint swelling and myalgias.   Neurological: Negative for tremors, seizures and headaches.   Psychiatric/Behavioral: Negative for confusion.     Objective:     Vital Signs (Most Recent):  Temp: 97.7 °F (36.5 °C) (06/25/20 0311)  Pulse: 90 (06/25/20 0400)  Resp: 15  (06/25/20 0400)  BP: 130/79 (06/25/20 0338)  SpO2: 99 % (06/25/20 0400) Vital Signs (24h Range):  Temp:  [97.6 °F (36.4 °C)-97.9 °F (36.6 °C)] 97.7 °F (36.5 °C)  Pulse:  [] 90  Resp:  [15-23] 15  SpO2:  [95 %-99 %] 99 %  BP: (128-179)/(58-96) 130/79     Weight: 38.6 kg (85 lb)  Body mass index is 16.6 kg/m².    Physical Exam  Constitutional:       Appearance: She is underweight. She is not toxic-appearing.   HENT:      Head: Normocephalic and atraumatic.      Nose: No congestion or rhinorrhea.      Mouth/Throat:      Mouth: Mucous membranes are dry.   Eyes:      Extraocular Movements: Extraocular movements intact.      Pupils: Pupils are equal, round, and reactive to light.   Neck:      Musculoskeletal: Normal range of motion and neck supple.   Cardiovascular:      Rate and Rhythm: Normal rate and regular rhythm.      Pulses: Normal pulses.      Heart sounds: Normal heart sounds.   Pulmonary:      Effort: Pulmonary effort is normal.      Breath sounds: Normal breath sounds. No wheezing or rales.   Chest:      Chest wall: No tenderness.   Abdominal:      General: Bowel sounds are normal. There is no distension.      Palpations: Abdomen is soft.      Tenderness: There is abdominal tenderness (generalized TTP periumbilical area). There is no right CVA tenderness, left CVA tenderness, guarding or rebound.   Musculoskeletal: Normal range of motion.         General: No swelling or tenderness.      Right lower leg: No edema.      Left lower leg: No edema.   Skin:     General: Skin is warm and dry.      Capillary Refill: Capillary refill takes 2 to 3 seconds.      Findings: No rash.   Neurological:      General: No focal deficit present.      Mental Status: She is alert and oriented to person, place, and time.   Psychiatric:         Mood and Affect: Mood normal.         Thought Content: Thought content normal.           CRANIAL NERVES     CN III, IV, VI   Pupils are equal, round, and reactive to light.        Significant Labs:   Bilirubin:   Recent Labs   Lab 06/24/20 2037   BILITOT 0.3     CBC:   Recent Labs   Lab 06/24/20 2037   WBC 8.45   HGB 14.3   HCT 42.4        CMP:   Recent Labs   Lab 06/24/20 2037      K 4.7   CL 98   CO2 19*   *   BUN 65*   CREATININE 1.8*   CALCIUM 10.0   PROT 8.3   ALBUMIN 4.4   BILITOT 0.3   ALKPHOS 129   AST 23   ALT 13   ANIONGAP 19*   EGFRNONAA 27*     Cardiac Markers:   Recent Labs   Lab 06/24/20 2037   BNP 31     Lactic Acid: No results for input(s): LACTATE in the last 48 hours.  Lipase:   Recent Labs   Lab 06/24/20 2037   LIPASE 11     POCT Glucose: No results for input(s): POCTGLUCOSE in the last 48 hours.  Troponin:   Recent Labs   Lab 06/24/20 2037 06/25/20  0034   TROPONINI 0.024 0.022     Urine Studies:   Recent Labs   Lab 06/25/20  0047   COLORU Yellow   APPEARANCEUA Clear   PHUR 5.0   SPECGRAV 1.015   PROTEINUA Negative   GLUCUA Negative   KETONESU 1+*   BILIRUBINUA Negative   OCCULTUA Negative   NITRITE Negative   UROBILINOGEN Negative   LEUKOCYTESUR Negative       Significant Imaging: I have reviewed and interpreted all pertinent imaging results/findings within the past 24 hours.

## 2020-06-25 NOTE — HPI
"This is a 75 y.o female with medical history of HTN, non-small cell lung cancer s/p Wedge resection 2019, COPD and smoking who presents to the hospital complaining of acute onset of periumbilical abdominal pain x 5 days with associated symptoms of decreased appetite and fatigue. Patient reports her abdominal pain started last Friday 6/19/2020. She describes pain like "hurt" and around her umbilical area with intensity of 7-8/10. Pain comes and goes, and is worse with eating/drinking. Patient reports pain is relieved if she doesn't eat or drink. She doesn't eat or drink well x5 days due to pain and decreased appetite. She doesn't try anything for pain at home. She reports she tried to see her PCP but was not able to schedule her appointment. She went to an UC today 6/24/2020 and was sent to the ED from urgent care for an abnormal EKG. Her last normal BM was this AM. She does smoke cigarettes, around 7-10 cig/day. She is not currently receiving chemotherapy. She denies fever, chills, emesis, constipations, diarrhea, dysuria, difficulty urinating, chest pain, SOB, leg swelling, rash or any other associated symptoms. She does not consume EtOH or use illicit drugs.    In ED, COVID negative. Chest xray stable chronic  emphysematous change with minimal interstitial edema. Abdominal x ray with nonobstructive bowel gas pattern.     CT abdomen pelvis concerns extrahepatic common duct edema/inflmation, sludged gallbladder and/or cholelithiasis, and UTI/cystitis.     US Abdomen with no cholelithiasis or acute cholecystitis, but warranted ERCP for further evaluation of extrahepatic common duct.     Labs with worsening renal function with elevated sCr 1.8, BUN 65. Bicarb is 19 with anion gap 19. BNP and trop negative. UA no infection with positive for bilirubin/ketone/protein.    Patient is placed in Observation with hospital medicine for further evaluation and treatment.      "

## 2020-06-25 NOTE — CONSULTS
.[facility]  Gastroenterology  Consult Note    Patient Name: Ariadna Villegas  MRN: [id number]  Admission Date: 2020  Hospital Length of Stay: 0 days  Code Status: Full Code   Primary Care Physician: Kirti Morris MD  Principal Problem:Abdominal pain    Consults  Subjective:     Chief complaint: Abdominal pain     HPI: The patient is a 75 year old female with a history of HTN, COPD, lung cancer s/p wedge resection and tobacco abuse presenting with abdominal pain.  She reports acute onset, persistent, moderate, RUQ abdominal pain beginning six days ago.  Associated nausea and vomiting on the day of symptom onset, but has not recurred.  Pain seems to be exacerbated by oral intake.  Temporarily alleviated with pain medication.  No fevers or chills.  Admits to losing a few pounds over the past week but no significant weight loss recently.  No history of similar symptoms.  She presented to urgent care for evaluation but was sent to the ED due to abnormal EKG.      Past medical history:  Hypertension.  Chronic obstructive pulmonary disease.  Osteoarthritis.   Non-small cell lung cancer.     Past surgical history:  Hysterectomy.   x 3.  Wedge resection of lung.     Social history:  Tobacco use: 1/3 ppd smoker.  Alcohol use: denies.  Illicit drug use: denies.     Family history:  No known family history of liver disease.    Medications:  Facility-Administered Medications Prior to Admission   Medication Dose Route Frequency Provider Last Rate Last Dose    [DISCONTINUED] 0.9%  NaCl infusion   Intravenous Once Neil Barajas DNP        [DISCONTINUED] dicyclomine tablet 20 mg  20 mg Oral 1 time in Clinic/HOD Neil Barajas DNP         Medications Prior to Admission   Medication Sig Dispense Refill Last Dose    aspirin (ECOTRIN) 81 MG EC tablet Take 1 tablet (81 mg total) by mouth once daily.   Past Week    azelastine (ASTELIN) 137 mcg (0.1 %) nasal spray 1 spray (137 mcg total) by  Nasal route 2 (two) times daily. 30 mL 0 Past Week    gabapentin (NEURONTIN) 300 MG capsule Take 300 mg by mouth 3 (three) times daily.    Past Week    metoprolol succinate (TOPROL-XL) 50 MG 24 hr tablet Take 1 tablet (50 mg total) by mouth once daily. 90 tablet 3 6/24/2020    rosuvastatin (CRESTOR) 20 MG tablet Take 1 tablet (20 mg total) by mouth once daily. 30 tablet 11 6/24/2020    tiotropium (SPIRIVA) 18 mcg inhalation capsule Inhale 1 capsule (18 mcg total) into the lungs once daily. Controller 90 capsule 1 Past Week    [DISCONTINUED] hydrOXYzine HCL (ATARAX) 25 MG tablet Take 1 tablet (25 mg total) by mouth every evening. 30 tablet 2 Past Week    [DISCONTINUED] losartan-hydrochlorothiazide 100-25 mg (HYZAAR) 100-25 mg per tablet Take 1 tablet by mouth once daily. 90 tablet 3 6/24/2020    [DISCONTINUED] sertraline (ZOLOFT) 50 MG tablet Take 1 tablet (50 mg total) by mouth once daily. 30 tablet 2 Past Week    albuterol (PROVENTIL/VENTOLIN HFA) 90 mcg/actuation inhaler INHALE 2 PUFFS INTO THE LUNGS EVERY 6 HOURS AS NEEDED FOR WHEEZING 25.5 g 0 More than a month    [DISCONTINUED] methylPREDNISolone (MEDROL DOSEPACK) 4 mg tablet use as directed (Patient not taking: Reported on 5/28/2020) 1 Package 0     [DISCONTINUED] oxyCODONE (ROXICODONE) 5 MG immediate release tablet TK 1 T PO Q 8 H PRN FOR PAIN       [DISCONTINUED] triamcinolone acetonide 0.1% (KENALOG) 0.1 % cream Apply topically 2 (two) times daily. 45 g 1      Allergies:  Lunesta.    Review of systems:  CONSTITUTIONAL: Negative for fever, chills, weakness, weight loss, weight gain.  HEENT: Negative for blurred vision, hearing loss, nasal congestion, dry mouth, sore throat.  CARDIOVASCULAR: Negative for chest pain or palpitations.  RESPIRATORY: Negative for SOB or cough.  GASTROINTESTINAL: See HPI  GENITOURINARY: Negative for dysuria or hematuria.  MUSCULOSKELETAL: Negative for osteoarthritis or muscle pain.  SKIN: Negative for  rashes/lesions.  NEUROLOGIC: Negative for headaches, numbness/tingling.  ENDOCRINE: Negative for diabetes or thyroid abnormalities.  HEMATOLOGIC: Negative for anemia or blood dyscrasias.    Objective:     Vital Signs (Most Recent):  Temp: 98.3 °F (36.8 °C) (06/25/20 1203)  Pulse: 81 (06/25/20 1203)  Resp: 18 (06/25/20 1203)  BP: (!) 155/68 (06/25/20 1203)  SpO2: 100 % (06/25/20 1203) Vital Signs (24h Range):  Temp:  [97.6 °F (36.4 °C)-98.4 °F (36.9 °C)] 98.3 °F (36.8 °C)  Pulse:  [] 81  Resp:  [15-23] 18  SpO2:  [95 %-100 %] 100 %  BP: (125-196)/(58-96) 155/68     Physical examination:  General: Thin white female in no apparent distress  HENT: NCAT, atraumatic, hearing grossly intact, no visible or palpable thyroid mass  Eyes: PERRL, EOMI, anicteric sclera  Cardiovascular: Regular rate and rhythm. No peripheral edema.   Lungs: Non-labored respirations. Breath sounds equal.   Abdomen: soft, mild RUQ TTP without rebound, ND, normoactive BS  Extremities: No C/C, 2+ dorsalis pedis pulses bilaterally  Neuro: AA&O x 3, no asterixes or tremors  Psych: Appropriate mood and affect. No SI.  Skin: No jaundice, rashes or lesions  Musculoskeletal: 5/5 strength bilaterally    Recent Labs   Lab 06/24/20 2037 06/25/20  0520   WBC 8.45 6.33   HGB 14.3 11.9*   HCT 42.4 37.0    219     Recent Labs   Lab 06/25/20  0520      CALCIUM 8.4*   ALBUMIN 3.6   PROT 6.9      K 4.7   CO2 21*      BUN 51*   CREATININE 1.3   ALKPHOS 111   ALT 9*   AST 23   BILITOT 0.3     Imaging:  Ultrasound abdomen limited (6/24/20):  Impression:  There is no sonographic evidence for cholelithiasis or acute cholecystitis.  Mild prominence of the extrahepatic common duct, correlating with the appearance on CT, with some relative diminished caliber at the level of the pancreatic head, the sonographic appearance is nonspecific, clinical and historical correlation is needed if clinically warranted ERCP may be helpful for further  evaluation.    CT abdomen and pelvis without contrast (6/24/20):  Impression:  Mild prominence of the extrahepatic common duct with suggestion of circumferential wall thickening and surrounding mild stranding that may relate to edema/inflammation, etiology is uncertain although this could relate to a process such as cholangitis, could represent sequelae of choledocholithiasis although not directly visualized, neoplasm would also be in the differential, clinical and historical correlation is needed.  Mild density of the gallbladder may relate to sludge and/or cholelithiasis there is no evidence for pericholecystic inflammatory change.  Hyperdense left renal lesion, ultrasound follow-up is recommended as discussed above.  Urinary bladder wall thickening, correlation for UTI/cystitis is needed.        Assessment:   75 year old female with a history of HTN, COPD, lung cancer s/p wedge resection and tobacco abuse presenting with abdominal pain.  Ultrasound and CT with prominence of extrahepatic CBD with circumferential wall thickening.  LFT's normal.  No fever or leukocytosis.     Plan:   1.  MRCP to further evaluate biliary tract.  2.  Monitor LFT's.  3.  Supportive measures.  4.  Will follow briefly.  Discussed case with Cookie De Luna NP.       Thank you for your consult.     Lali Harris PA-C  Gastroenterology  [facility]

## 2020-06-25 NOTE — ASSESSMENT & PLAN NOTE
Afebrile. No leukocytosis. Denies dysuria. UA no infection.  CT concern UTI/cystitis with bladder wall thickening    - Continue to monitor.

## 2020-06-25 NOTE — PROGRESS NOTES
WRITTEN HEALTHCARE DISCHARGE INFORMATION      Things that YOU are RESPONSIBLE for to Manage Your Care At Home:     1. Getting your prescriptions filled.  2. Taking you medications as directed. DO NOT MISS ANY DOSES!  3. Going to your follow-up doctor appointments. This is important because it allows the doctor to monitor your progress and to determine if any changes need to be made to your treatment plan.     If you are unable to make your follow up appointments, please call the number listed and reschedule this appointment.      ____________HELP AT HOME____________________     Experiencing any SIGNS or SYMPTOMS: YOU CAN     Schedule a same day appopintment with your Primary Care Doctor or  you can call [facility] On Call Nurse Care Line for 24/7 assistance at [phone]     If you are experience any signs or symptoms that have become severe, Call 911 and come to your nearest Emergency Room.     Thank you for choosing [facility] and allowing us to care for you.   From your care management team:      You should receive a call from [facility] within 48-72 hours to help manage your care after discharge. Please try to make sure that you answer your phone for this important phone call.    Follow-up Information     Kirti Morris MD On 7/16/2020.    Specialty: Internal Medicine  Why: Appointment scheduled for July 16th at 10:40am  Contact information:  [address] [facility] [address]  [phone]             [facility] ([facility] Loc[facility].    Specialty: Gastroenterology  Contact information:  [address] [place] [address]  [address]  [place] [address]  [phone]             Aaron Shearer MD.    Why: you will be contacted with your follow up appointment when scheduled by the office.  Contact information:  [address] [place] [address]  [address]  [place] [address]  [phone]

## 2020-06-25 NOTE — PROGRESS NOTES
Bedside report given to oncoming nurse tonny, NAD noted. Pt lying in bed, Respirations even and unlabored. Safety maintained, Call light within reach. Pt remains NPO for GI consult.     chart check completed.

## 2020-06-25 NOTE — ASSESSMENT & PLAN NOTE
Lab Results   Component Value Date    LDLCALC 72.4 09/16/2019     Lipid panel pending.  - Continue Crestor

## 2020-06-25 NOTE — ASSESSMENT & PLAN NOTE
sCr/GFR worse than baseline. Previous GFR 37 on 1/15/2020. Today GFR 27. Probably from decreased po intake/dehydration/FERNANDO  - See above  - Will renal dose meds, avoid nephrotoxins and repeat labs in AM

## 2020-06-25 NOTE — ASSESSMENT & PLAN NOTE
sCr 1.8 and BUN 65. Worse than baseline. Probably from decreased po intake/dehydration.    - Given IVF bolus in ED  - Continue IVF  - Recheck lab in AM  - Avoid nephrotoxins. Renal dose all meds

## 2020-06-25 NOTE — ED PROVIDER NOTES
Encounter Date: 2020    SCRIBE #1 NOTE: I, Owen Smith, am scribing for, and in the presence of,  Jose Martin Wan MD. I have scribed the following portions of the note - Other sections scribed: HPI, ROS, PE, MDM.       History     Chief Complaint   Patient presents with    Abdominal Pain     Pt c/o abdominal pain upper quadurant, weakness, dehydrated being going for a couple days. Pt was seen at the urgent care and was sent her for abnormal ECG and abd pain.      This 75 y.o F with a hx of COPD, HTN and Non-small cell lung cancer presents to the ED c/o acute onset of periumbilical abdominal pain, decreased appetite and fatigue x5 days. The pt was sent to the ED from urgent care for an abnormal EKG. Her last BM was this AM. She does smoke cigarettes. She is not currently receiving chemotherapy. She denies fever, chills, emesis, constipations, diarrhea, dysuria, difficulty urinating, chest pain, SOB, leg swelling, rash or any other associated symptoms. She does not consume EtOH or use illicit drugs. No prior tx.         Review of patient's allergies indicates:   Allergen Reactions    Lunesta [eszopiclone] Other (See Comments)     Sleep walking     Past Medical History:   Diagnosis Date    Arthritis     neck and back    COPD (chronic obstructive pulmonary disease)     Malignant hypertension     Non-small cell lung cancer 2018    Osteoporosis, postmenopausal     Tobacco use disorder      Past Surgical History:   Procedure Laterality Date     x3      HYSTERECTOMY      LUNG BIOPSY N/A 2018    Procedure: Biopsy-Lung;  Surgeon: [facility];  Location: [facility] OR [address] [place] [address];  Service: Radiology;  Laterality: N/A;    LUNG CANCER SURGERY Right 2019    PARTIAL HYSTERECTOMY      , after her third     SALPINGOOPHORECTOMY      , for a ovarian cyst    THORACOSCOPIC WEDGE RESECTION OF LUNG Right 2019    Procedure: VATS, WITH WEDGE RESECTION, LUNG;  Surgeon:  Celso Jewell MD;  Location: [address];  Service: Thoracic;  Laterality: Right;     Family History   Problem Relation Age of Onset    Heart disease Mother     Diabetes Mother     Stroke Father     Heart disease Sister     Anesthesia problems Neg Hx      Social History     Tobacco Use    Smoking status: Current Every Day Smoker     Packs/day: 0.15     Years: 60.00     Pack years: 9.00     Types: Cigarettes    Smokeless tobacco: Never Used    Tobacco comment: quit x 2-3 wks. ago   Substance Use Topics    Alcohol use: Yes     Comment: socially    Drug use: No     Review of Systems   Constitutional: Positive for appetite change and fatigue. Negative for chills, diaphoresis and fever.   HENT: Negative for congestion and rhinorrhea.    Eyes: Negative for redness.   Respiratory: Negative for cough and shortness of breath.    Cardiovascular: Negative for chest pain.   Gastrointestinal: Positive for abdominal pain. Negative for diarrhea, nausea and vomiting.   Genitourinary: Negative for difficulty urinating and dysuria.   Musculoskeletal: Negative for back pain and neck pain.   Skin: Negative for rash.   Neurological: Negative for syncope.   Psychiatric/Behavioral: The patient is not nervous/anxious.        Physical Exam     Initial Vitals [06/24/20 1839]   BP Pulse Resp Temp SpO2   (!) 130/94 (!) 116 17 97.9 °F (36.6 °C) 99 %      MAP       --         Physical Exam    Nursing note and vitals reviewed.  Constitutional: Vital signs are normal. She appears well-developed and well-nourished.  Non-toxic appearance. No distress.   Very Thin.    HENT:   Head: Normocephalic and atraumatic.   Mouth/Throat: Oropharynx is clear and moist and mucous membranes are normal.   Eyes are sunken.   Eyes: Conjunctivae and EOM are normal. Pupils are equal, round, and reactive to light. Right conjunctiva is not injected. Left conjunctiva is not injected. No scleral icterus.   Neck: Normal range of motion and full passive  range of motion without pain. Neck supple.   Cardiovascular: Normal rate, regular rhythm, S1 normal, S2 normal and normal heart sounds. Exam reveals no gallop.    No murmur heard.  Pulses:       Radial pulses are 2+ on the right side and 2+ on the left side.   Pulmonary/Chest: Effort normal and breath sounds normal. No respiratory distress.   Abdominal: Soft. She exhibits no distension. There is abdominal tenderness (mild) in the epigastric area. There is no rebound and no guarding.   Musculoskeletal: Normal range of motion. No edema.      Comments: Good active ROM of all extremities. No lower extremity edema or cyanosis.    Neurological: No cranial nerve deficit or sensory deficit. Gait normal.   A&Ox4. Normal Speech.    Skin: Skin is warm. No ecchymosis and no rash noted.   Delayed capillary refill.   Psychiatric: She has a normal mood and affect. Thought content normal.         ED Course   Procedures  Labs Reviewed   CBC W/ AUTO DIFFERENTIAL - Abnormal; Notable for the following components:       Result Value    MPV 8.8 (*)     Gran% 73.3 (*)     Lymph% 17.9 (*)     All other components within normal limits   COMPREHENSIVE METABOLIC PANEL - Abnormal; Notable for the following components:    CO2 19 (*)     Glucose 116 (*)     BUN, Bld 65 (*)     Creatinine 1.8 (*)     Anion Gap 19 (*)     eGFR if  31 (*)     eGFR if non  27 (*)     All other components within normal limits   URINALYSIS, REFLEX TO URINE CULTURE - Abnormal; Notable for the following components:    Ketones, UA 1+ (*)     All other components within normal limits    Narrative:     Preferred Collection Type->Urine, Clean Catch  Specimen Source->Urine   TROPONIN I   B-TYPE NATRIURETIC PEPTIDE   LIPASE   SARS-COV-2 RNA AMPLIFICATION, QUAL   TROPONIN I     EKG Readings: (Independently Interpreted)   EKG done 2130 showing normal sinus rhythm rate 95.  No ST elevation.  No major T-wave abnormalities.  Normal axis and QRS.   Little biphasic with wavy baseline and lead 3 on the T-waves.  QRS is 80 QTC is 447.  Nonspecific EKG.  Compared to previous and fairly similar.     ECG Results          EKG 12-lead (In process)  Result time 06/24/20 20:30:28    In process by Interface, Lab In [facility] (06/24/20 20:30:28)                 Narrative:    Test Reason : R10.13,    Vent. Rate : 104 BPM     Atrial Rate : 104 BPM     P-R Int : 116 ms          QRS Dur : 082 ms      QT Int : 334 ms       P-R-T Axes : 078 078 064 degrees     QTc Int : 439 ms    Sinus tachycardia  Biatrial enlargement  Nonspecific ST and T wave abnormality  Abnormal ECG  When compared with ECG of 16-SEP-2019 14:53,  Vent. rate has increased BY  44 BPM  ST no longer elevated in Inferior leads  Non-specific change in ST segment in Lateral leads  T wave inversion now evident in Inferior leads  Nonspecific T wave abnormality now evident in Anterior-lateral leads    Referred By: TRISH RODARTE           Confirmed By:                             Imaging Results          US Abdomen Limited (Gallbladder) (Final result)  Result time 06/25/20 02:53:01    Final result by Philip Li MD (06/25/20 02:53:01)                 Impression:      There is no sonographic evidence for cholelithiasis or acute cholecystitis.    Mild prominence of the extrahepatic common duct, correlating with the appearance on CT, with some relative diminished caliber at the level of the pancreatic head, the sonographic appearance is nonspecific, clinical and historical correlation is needed if clinically warranted ERCP may be helpful for further evaluation.      Electronically signed by: Philip Li  Date:    06/25/2020  Time:    02:53             Narrative:    EXAMINATION:  US ABDOMEN LIMITED    CLINICAL HISTORY:  Epigastric pain    TECHNIQUE:  Limited ultrasound of the right upper quadrant of the abdomen (including pancreas, liver, gallbladder, common bile duct, and spleen) was  performed.    COMPARISON:  CT examination June 24, 2020    FINDINGS:  There is limited visualization of the pancreas.  Gallbladder is identified, there is no sonographic evidence for cholelithiasis, there is no abnormal gallbladder wall thickening or pericholecystic fluid and the technologist indicates a negative sonographic Iqbal sign.    Corresponding to the appearance on CT the extrahepatic common duct is somewhat prominent measuring approximately 7.8 mm, although diminishing in caliber closer to the hepatic hilum, also diminished caliber at the level the pancreatic head measuring 3.8 mm, there is no choledocholithiasis identified on submitted imaging.  Given the appearance on CT clinical and historical correlation is needed if indicated ERCP may be helpful for further evaluation.    The submitted hepatic length measurement is 13 cm, there is no sonographic evidence for focal hepatic mass lesion on submitted imaging.  Limited imaging of the right kidney demonstrates no evidence for hydronephrosis, the right kidney measures 9.2 cm in length.                                CT Abdomen Pelvis  Without Contrast (Final result)  Result time 06/24/20 23:09:25    Final result by Philip Li MD (06/24/20 23:09:25)                 Impression:      Mild prominence of the extrahepatic common duct with suggestion of circumferential wall thickening and surrounding mild stranding that may relate to edema/inflammation, etiology is uncertain although this could relate to a process such as cholangitis, could represent sequelae of choledocholithiasis although not directly visualized, neoplasm would also be in the differential, clinical and historical correlation is needed.    Mild density of the gallbladder may relate to sludge and/or cholelithiasis there is no evidence for pericholecystic inflammatory change.    Hyperdense left renal lesion, ultrasound follow-up is recommended as discussed above.    Urinary bladder wall  thickening, correlation for UTI/cystitis is needed.    This report was flagged in Epic as abnormal.      Electronically signed by: Philip Li  Date:    06/24/2020  Time:    23:09             Narrative:    EXAMINATION:  CT ABDOMEN PELVIS WITHOUT CONTRAST    CLINICAL HISTORY:  Epigastric pain;    TECHNIQUE:  Low dose axial images, sagittal and coronal reformations were obtained from the lung bases to the pubic symphysis.  Intravenous and oral contrast was not utilized.    COMPARISON:  None    FINDINGS:  The lung bases demonstrate appearance of chronic and atelectatic change and mild motion artifact.  The stomach appears incompletely distended nonspecific appearance.  Mild layering density of the gallbladder may relate to sludge and/or cholelithiasis, there is no evidence for pericholecystic inflammatory change.  There is no evidence for peripancreatic inflammatory change, atrophic change of the pancreas is noted.  There is a structure thought to represent the common duct as seen on axial images 36 through 42, it is mildly prominent although not markedly distended, measuring approximately 7.5 mm in caliber, however demonstrates appearance of circumferential wall thickening and suggestion of mild surrounding haziness, this may relate to a process such as cholangitis, clinical and historical correlation is needed, there is no CT detectable choledocholithiasis.  There is no additional evidence for abnormal pancreatic or biliary ductal dilatation.    When accounting for limitations of the examination and artifact there is no additional evidence for acute process of the liver, spleen or adrenal glands.  The right kidney demonstrates an extrarenal pelvis, there is no additional evidence for hydronephrosis or obstructive uropathy or perinephric inflammatory change bilaterally.  As seen on axial image 36 there is a hyperdense lesion at the midpole of the left kidney measuring approximately 10.9 mm in size, this may relate  to a proteinaceous or hemorrhagic cyst however ultrasound follow-up is recommended.    The arterial vascular structures including the aorta demonstrate atherosclerotic change, not well evaluated on this exam.  There is urinary bladder wall thickening noted, this may relate to chronic change however correlation for UTI/cystitis is needed.  The patient appears status post hysterectomy.    There is no evidence for small bowel obstructive process.  The appendix is difficult to definitively identify.  There is no evidence for inflammatory or obstructive process of the colon.  There is no evidence for free intraperitoneal air.  The osseous structures demonstrate chronic change, there is diminished mineralization and degenerative change noted, density involving what appears to be L2 vertebral level is most consistent with prior vertebroplasty or kyphoplasty, endplate concavity is also noted.  There is no finding of the osseous structures to specifically suggest acute process.  Air density within the soft tissues of the left flank including deep musculature is noted correlation for any recent injection or injury to account for this there are also some calcifications within the soft tissues that could be granulomatous.                               X-Ray Chest AP Portable (Final result)  Result time 06/24/20 20:19:42    Final result by Karsten Rivera MD (06/24/20 20:19:42)                 Impression:      1. Overall, grossly stable chronic pulmonary findings suggesting emphysematous change noting prior surgical change.  Minimal interstitial edema is a consideration, no large focal consolidation.      Electronically signed by: Karsten Rivera MD  Date:    06/24/2020  Time:    20:19             Narrative:    EXAMINATION:  XR CHEST AP PORTABLE    CLINICAL HISTORY:  epigastric pain;    TECHNIQUE:  Single frontal view of the chest was performed.    COMPARISON:  08/16/2019    FINDINGS:  The cardiomediastinal silhouette is not  enlarged noting calcification of the aorta..  There is no pleural effusion.  The trachea is midline.  The lungs are symmetrically expanded bilaterally with coarse interstitial attenuation and scattered regions of bandlike atelectasis or scarring.  Surgical changes project over the right midlung zone.  There is scattered emphysematous change.  There is bilateral basilar subsegmental atelectasis..  No large focal consolidation seen.  There is no pneumothorax.  The osseous structures are remarkable for degenerative changes.  There is a bone island or infarct within the left humeral head.  There is vertebroplasty change..                                 Medical Decision Making:   Initial Assessment:   75 year old patient presenting 2/2 abdominal pain in the epigastric to right upper quadrant.  Get labs which are consistent with acute kidney injury.  UA negative.  Afebrile.  No fevers at home.  Vitals reassuring other mild hypertension.  Getting a CT of the abdomen to look further workup management.  Highest on differential is pancreatitis, gastritis, gallbladder etiology on the patient.  Small-bowel obstructions also consideration.  Less likely UTI or pyelonephritis or hernia due to HPI exam.     CT is concerning with some edema around the extrahepatic ducts.  Differential includes mass, stone, passed stone, choledocholithiasis, cholangitis.  I doubt infection secondary no white count and afebrile here.  Getting ultrasound further workup to make sure the patient does not have any cholecystitis.    Ultrasound does not show cholecystitis or cholelithiasis.  Still afebrile.  Still hand pain.  Multiple doses of IV morphine and Reglan and Bentyl.  Patient's min for observation for pain control and also gastrology to evaluate patient and also imaging in the morning and possible ERCP.  Patient in agreement with plan. NPO  Jose Martin Wan    Independently Interpreted Test(s):   I have ordered and independently interpreted EKG  Reading(s) - see prior notes  Clinical Tests:   Lab Tests: Ordered and Reviewed  Radiological Study: Ordered and Reviewed  Medical Tests: Ordered and Reviewed            Scribe Attestation:   Scribe #1: I performed the above scribed service and the documentation accurately describes the services I performed. I attest to the accuracy of the note.                          Clinical Impression:       ICD-10-CM ICD-9-CM   1. FERNANDO (acute kidney injury)  N17.9 584.9   2. Epigastric pain  R10.13 789.06   3. Dehydration  E86.0 276.51             ED Disposition Condition    Observation              I, Jose Martin Wan, personally performed the services described in this documentation. All medical record entries made by the scribe were at my direction and in my presence. I have reviewed the chart and agree that the record reflects my personal performance and is accurate and complete.               Jose Martin Wan MD  06/25/20 0318

## 2020-06-26 VITALS
TEMPERATURE: 97 F | SYSTOLIC BLOOD PRESSURE: 150 MMHG | OXYGEN SATURATION: 98 % | RESPIRATION RATE: 16 BRPM | WEIGHT: 85 LBS | HEART RATE: 67 BPM | BODY MASS INDEX: 16.69 KG/M2 | DIASTOLIC BLOOD PRESSURE: 67 MMHG | HEIGHT: 60 IN

## 2020-06-26 LAB
ALBUMIN SERPL BCP-MCNC: 3 G/DL (ref 3.5–5.2)
ALP SERPL-CCNC: 88 U/L (ref 55–135)
ALT SERPL W/O P-5'-P-CCNC: 7 U/L (ref 10–44)
ANION GAP SERPL CALC-SCNC: 10 MMOL/L (ref 8–16)
AST SERPL-CCNC: 19 U/L (ref 10–40)
BASOPHILS # BLD AUTO: 0.03 K/UL (ref 0–0.2)
BASOPHILS NFR BLD: 0.5 % (ref 0–1.9)
BILIRUB SERPL-MCNC: 0.2 MG/DL (ref 0.1–1)
BUN SERPL-MCNC: 30 MG/DL (ref 8–23)
CALCIUM SERPL-MCNC: 7.9 MG/DL (ref 8.7–10.5)
CHLORIDE SERPL-SCNC: 106 MMOL/L (ref 95–110)
CO2 SERPL-SCNC: 23 MMOL/L (ref 23–29)
CREAT SERPL-MCNC: 0.9 MG/DL (ref 0.5–1.4)
DIFFERENTIAL METHOD: ABNORMAL
EOSINOPHIL # BLD AUTO: 0.1 K/UL (ref 0–0.5)
EOSINOPHIL NFR BLD: 2.2 % (ref 0–8)
ERYTHROCYTE [DISTWIDTH] IN BLOOD BY AUTOMATED COUNT: 13.5 % (ref 11.5–14.5)
EST. GFR  (AFRICAN AMERICAN): >60 ML/MIN/1.73 M^2
EST. GFR  (NON AFRICAN AMERICAN): >60 ML/MIN/1.73 M^2
GLUCOSE SERPL-MCNC: 88 MG/DL (ref 70–110)
HCT VFR BLD AUTO: 31.4 % (ref 37–48.5)
HGB BLD-MCNC: 10.3 G/DL (ref 12–16)
IMM GRANULOCYTES # BLD AUTO: 0.02 K/UL (ref 0–0.04)
IMM GRANULOCYTES NFR BLD AUTO: 0.3 % (ref 0–0.5)
LYMPHOCYTES # BLD AUTO: 1.8 K/UL (ref 1–4.8)
LYMPHOCYTES NFR BLD: 29.9 % (ref 18–48)
MCH RBC QN AUTO: 29.8 PG (ref 27–31)
MCHC RBC AUTO-ENTMCNC: 32.8 G/DL (ref 32–36)
MCV RBC AUTO: 91 FL (ref 82–98)
MONOCYTES # BLD AUTO: 0.5 K/UL (ref 0.3–1)
MONOCYTES NFR BLD: 9.1 % (ref 4–15)
NEUTROPHILS # BLD AUTO: 3.5 K/UL (ref 1.8–7.7)
NEUTROPHILS NFR BLD: 58 % (ref 38–73)
NRBC BLD-RTO: 0 /100 WBC
PLATELET # BLD AUTO: 184 K/UL (ref 150–350)
PMV BLD AUTO: 9.5 FL (ref 9.2–12.9)
POTASSIUM SERPL-SCNC: 4.2 MMOL/L (ref 3.5–5.1)
PROT SERPL-MCNC: 5.7 G/DL (ref 6–8.4)
RBC # BLD AUTO: 3.46 M/UL (ref 4–5.4)
SODIUM SERPL-SCNC: 139 MMOL/L (ref 136–145)
WBC # BLD AUTO: 5.96 K/UL (ref 3.9–12.7)

## 2020-06-26 PROCEDURE — C9113 INJ PANTOPRAZOLE SODIUM, VIA: HCPCS | Performed by: INTERNAL MEDICINE

## 2020-06-26 PROCEDURE — 63600175 PHARM REV CODE 636 W HCPCS: Performed by: NURSE PRACTITIONER

## 2020-06-26 PROCEDURE — 96375 TX/PRO/DX INJ NEW DRUG ADDON: CPT | Performed by: EMERGENCY MEDICINE

## 2020-06-26 PROCEDURE — 85025 COMPLETE CBC W/AUTO DIFF WBC: CPT

## 2020-06-26 PROCEDURE — 96376 TX/PRO/DX INJ SAME DRUG ADON: CPT | Performed by: EMERGENCY MEDICINE

## 2020-06-26 PROCEDURE — 25000003 PHARM REV CODE 250: Performed by: NURSE PRACTITIONER

## 2020-06-26 PROCEDURE — 80053 COMPREHEN METABOLIC PANEL: CPT

## 2020-06-26 PROCEDURE — 63600175 PHARM REV CODE 636 W HCPCS: Performed by: INTERNAL MEDICINE

## 2020-06-26 PROCEDURE — 94761 N-INVAS EAR/PLS OXIMETRY MLT: CPT

## 2020-06-26 PROCEDURE — G0378 HOSPITAL OBSERVATION PER HR: HCPCS

## 2020-06-26 PROCEDURE — 94640 AIRWAY INHALATION TREATMENT: CPT

## 2020-06-26 PROCEDURE — 36415 COLL VENOUS BLD VENIPUNCTURE: CPT

## 2020-06-26 PROCEDURE — 25000003 PHARM REV CODE 250: Performed by: INTERNAL MEDICINE

## 2020-06-26 RX ORDER — DICYCLOMINE HYDROCHLORIDE 10 MG/1
10 CAPSULE ORAL 3 TIMES DAILY
Qty: 90 CAPSULE | Refills: 0 | Status: SHIPPED | OUTPATIENT
Start: 2020-06-26 | End: 2020-07-26

## 2020-06-26 RX ORDER — PANTOPRAZOLE SODIUM 40 MG/1
40 TABLET, DELAYED RELEASE ORAL DAILY
Qty: 30 TABLET | Refills: 1 | Status: SHIPPED | OUTPATIENT
Start: 2020-06-26 | End: 2021-06-26

## 2020-06-26 RX ORDER — PANTOPRAZOLE SODIUM 40 MG/10ML
40 INJECTION, POWDER, LYOPHILIZED, FOR SOLUTION INTRAVENOUS DAILY
Status: DISCONTINUED | OUTPATIENT
Start: 2020-06-26 | End: 2020-06-26 | Stop reason: HOSPADM

## 2020-06-26 RX ORDER — SUCRALFATE 1 G/10ML
1 SUSPENSION ORAL
Qty: 1200 ML | Refills: 1 | Status: SHIPPED | OUTPATIENT
Start: 2020-06-26 | End: 2020-07-26

## 2020-06-26 RX ORDER — SUCRALFATE 1 G/10ML
1 SUSPENSION ORAL
Status: DISCONTINUED | OUTPATIENT
Start: 2020-06-26 | End: 2020-06-26 | Stop reason: HOSPADM

## 2020-06-26 RX ADMIN — SUCRALFATE 1 G: 1 SUSPENSION ORAL at 05:06

## 2020-06-26 RX ADMIN — GABAPENTIN 300 MG: 300 CAPSULE ORAL at 09:06

## 2020-06-26 RX ADMIN — DICYCLOMINE HYDROCHLORIDE 10 MG: 10 CAPSULE ORAL at 05:06

## 2020-06-26 RX ADMIN — SUCRALFATE 1 G: 1 SUSPENSION ORAL at 12:06

## 2020-06-26 RX ADMIN — ROSUVASTATIN CALCIUM 20 MG: 10 TABLET, COATED ORAL at 09:06

## 2020-06-26 RX ADMIN — DICYCLOMINE HYDROCHLORIDE 10 MG: 10 CAPSULE ORAL at 09:06

## 2020-06-26 RX ADMIN — METOPROLOL SUCCINATE 50 MG: 50 TABLET, FILM COATED, EXTENDED RELEASE ORAL at 09:06

## 2020-06-26 RX ADMIN — GABAPENTIN 300 MG: 300 CAPSULE ORAL at 05:06

## 2020-06-26 RX ADMIN — PANTOPRAZOLE SODIUM 40 MG: 40 INJECTION, POWDER, FOR SOLUTION INTRAVENOUS at 09:06

## 2020-06-26 RX ADMIN — TIOTROPIUM BROMIDE 18 MCG: 18 CAPSULE ORAL; RESPIRATORY (INHALATION) at 07:06

## 2020-06-26 RX ADMIN — MORPHINE SULFATE 1 MG: 10 INJECTION INTRAVENOUS at 01:06

## 2020-06-26 NOTE — PLAN OF CARE
Problem: Fall Injury Risk  Goal: Absence of Fall and Fall-Related Injury  Outcome: Met     Problem: Adult Inpatient Plan of Care  Goal: Plan of Care Review  Outcome: Met  Goal: Patient-Specific Goal (Individualization)  Outcome: Met  Goal: Absence of Hospital-Acquired Illness or Injury  Outcome: Met  Goal: Optimal Comfort and Wellbeing  Outcome: Met  Goal: Readiness for Transition of Care  Outcome: Met  Goal: Rounds/Family Conference  Outcome: Met     Problem: Electrolyte Imbalance (Acute Kidney Injury/Impairment)  Goal: Serum Electrolyte Balance  Outcome: Met     Problem: Fluid Imbalance (Acute Kidney Injury/Impairment)  Goal: Optimal Fluid Balance  Outcome: Met     Problem: Hematologic Alteration (Acute Kidney Injury/Impairment)  Goal: Hemoglobin, Hematocrit and Platelets Within Normal Range  Outcome: Met     Problem: Oral Intake Inadequate (Acute Kidney Injury/Impairment)  Goal: Optimal Nutrition Intake  Outcome: Met     Problem: Renal Function Impairment (Acute Kidney Injury/Impairment)  Goal: Effective Renal Function  Outcome: Met

## 2020-06-26 NOTE — DISCHARGE SUMMARY
"[facility] Hospital Medicine  Discharge Summary      Patient Name: Ariadna Villegas  MRN: [id number]  Admission Date: 6/24/2020  Hospital Length of Stay: 0 days  Discharge Date and Time:  06/26/2020 5:23 PM  Attending Physician: Tiffani Kilgore MD   Discharging Provider: Cookie De Luna NP  Primary Care Provider: Kirti Morris MD      HPI:   This is a 75 y.o female with medical history of HTN, non-small cell lung cancer s/p Wedge resection 2019, COPD and smoking who presents to the hospital complaining of acute onset of periumbilical abdominal pain x 5 days with associated symptoms of decreased appetite and fatigue. Patient reports her abdominal pain started last Friday 6/19/2020. She describes pain like "hurt" and around her umbilical area with intensity of 7-8/10. Pain comes and goes, and is worse with eating/drinking. Patient reports pain is relieved if she doesn't eat or drink. She doesn't eat or drink well x5 days due to pain and decreased appetite. She doesn't try anything for pain at home. She reports she tried to see her PCP but was not able to schedule her appointment. She went to an UC today 6/24/2020 and was sent to the ED from urgent care for an abnormal EKG. Her last normal BM was this AM. She does smoke cigarettes, around 7-10 cig/day. She is not currently receiving chemotherapy. She denies fever, chills, emesis, constipations, diarrhea, dysuria, difficulty urinating, chest pain, SOB, leg swelling, rash or any other associated symptoms. She does not consume EtOH or use illicit drugs.    In ED, COVID negative. Chest xray stable chronic  emphysematous change with minimal interstitial edema. Abdominal x ray with nonobstructive bowel gas pattern.     CT abdomen pelvis concerns extrahepatic common duct edema/inflmation, sludged gallbladder and/or cholelithiasis, and UTI/cystitis.     US Abdomen with no cholelithiasis or acute cholecystitis, but warranted ERCP for further evaluation " of extrahepatic common duct.     Labs with worsening renal function with elevated sCr 1.8, BUN 65. Bicarb is 19 with anion gap 19. BNP and trop negative. UA no infection with positive for bilirubin/ketone/protein.    Patient is placed in Observation with hospital medicine for further evaluation and treatment.        * No surgery found *      Hospital Course:   Mrs. Villegas placed observation for abdominal pain with one episode of bilious material on Sat (6 days ago) found on CT/US to mild prominence extrahepatic common duct with relative diminished calibar at the level of the pancreatic head.   Patient initially went to Urgent care for abdominal pain but was sent to hospital for abnormal EKG. EKG  TWI inferior leads, troponin neg x 2 and normal BNP. Denies chest pain or shortness of breath. 9/7/2019 NST no evidence of ischemia.   Patient remained afebrile, no leukocytosis, LFT's/ lipase/lactic acid normal. Abdominal pain improved with bowel rest but sore on palpation upper abdomen L>R. Denies recent weight lost besides recently since Friday due current pain10/30/18 cscope multiple polyps (not biospy; follow up in 3 yrs). MRCP showed prominence intrahepatic and extrahepatic without obstruction. Normal HIDA. GI recommended continue bentyl, protonix, and carafate. Patient will follow up GI for outpatient EUS. Tolerated full liquid advance to regular diet. Patient stable for discharge home. Encourage smoking cessation and avoid all NSAID.      Consults:   Consults (From admission, onward)        Status Ordering Provider     Inpatient consult to Gastroenterology  Once     Provider:  Bertha Ashley MD    Completed TABATHA PENN          No new Assessment & Plan notes have been filed under this hospital service since the last note was generated.  Service: Hospital Medicine    Final Active Diagnoses:    Diagnosis Date Noted POA    PRINCIPAL PROBLEM:  Abdominal pain [R10.9] 06/25/2020 Unknown    FERNANDO (acute kidney  injury) [N17.9] 06/25/2020 Yes    Cystitis [N30.90] 06/25/2020 Unknown    Tobacco use [Z72.0] 06/25/2020 Unknown    Squamous cell carcinoma of right lung [C34.91] 01/16/2019 Yes    Centrilobular emphysema [J43.2]  Yes     Chronic    Essential hypertension [I10] 01/12/2015 Yes     Chronic    COPD (chronic obstructive pulmonary disease) [J44.9] 01/12/2015 Yes     Chronic    CKD (chronic kidney disease) stage 3, GFR 30-59 ml/min [N18.3] 11/11/2014 Yes    Hyperlipidemia [E78.5] 11/05/2012 Yes     Chronic      Problems Resolved During this Admission:       Discharged Condition: good    Disposition: Home or Self Care    Follow Up:  Follow-up Information     Kirti Morris MD On 7/16/2020.    Specialty: Internal Medicine  Why: Appointment scheduled for July 16th at 10:40am  Contact information:  [address] [facility] [address]  [phone]             Magaly Solano PA-C On 7/10/2020.    Specialty: Gastroenterology  Why: Appointment scheduled for July 10th at 12:15pm  Contact information:  [address]  [facility]  [facility] [address]  [phone]                 Patient Instructions:      Diet Dysphagia Soft     Diet Cardiac     Diet Adult Regular     Notify your health care provider if you experience any of the following:  temperature >100.4     Notify your health care provider if you experience any of the following:  persistent nausea and vomiting or diarrhea     Notify your health care provider if you experience any of the following:  redness, tenderness, or signs of infection (pain, swelling, redness, odor or green/yellow discharge around incision site)     Notify your health care provider if you experience any of the following:  increased confusion or weakness     Notify your health care provider if you experience any of the following:  temperature >100.4     Notify your health care provider if you experience any of the following:  difficulty breathing or increased cough      Notify your health care provider if you experience any of the following:  increased confusion or weakness     Activity as tolerated     Activity as tolerated       Significant Diagnostic Studies: Labs: All labs within the past 24 hours have been reviewed    Pending Diagnostic Studies:     None         Medications:  Reconciled Home Medications:      Medication List      START taking these medications    dicyclomine 10 MG capsule  Commonly known as: BENTYL  Take 1 capsule (10 mg total) by mouth 3 (three) times daily.     pantoprazole 40 MG tablet  Commonly known as: PROTONIX  Take 1 tablet (40 mg total) by mouth once daily.     sucralfate 100 mg/mL suspension  Commonly known as: CARAFATE  Take 10 mLs (1 g total) by mouth 4 (four) times daily before meals and nightly.        CONTINUE taking these medications    aspirin 81 MG EC tablet  Commonly known as: ECOTRIN  Take 1 tablet (81 mg total) by mouth once daily.     azelastine 137 mcg (0.1 %) nasal spray  Commonly known as: ASTELIN  1 spray (137 mcg total) by Nasal route 2 (two) times daily.     gabapentin 300 MG capsule  Commonly known as: NEURONTIN  Take 300 mg by mouth 3 (three) times daily.     metoprolol succinate 50 MG 24 hr tablet  Commonly known as: TOPROL-XL  Take 1 tablet (50 mg total) by mouth once daily.     rosuvastatin 20 MG tablet  Commonly known as: CRESTOR  Take 1 tablet (20 mg total) by mouth once daily.     tiotropium 18 mcg inhalation capsule  Commonly known as: SPIRIVA  Inhale 1 capsule (18 mcg total) into the lungs once daily. Controller        STOP taking these medications    hydrOXYzine HCL 25 MG tablet  Commonly known as: ATARAX     losartan-hydrochlorothiazide 100-25 mg 100-25 mg per tablet  Commonly known as: HYZAAR     methylPREDNISolone 4 mg tablet  Commonly known as: MEDROL DOSEPACK     oxyCODONE 5 MG immediate release tablet  Commonly known as: ROXICODONE     sertraline 50 MG tablet  Commonly known as: ZOLOFT     triamcinolone  acetonide 0.1% 0.1 % cream  Commonly known as: KENALOG        ASK your doctor about these medications    albuterol 90 mcg/actuation inhaler  Commonly known as: PROVENTIL/VENTOLIN HFA  INHALE 2 PUFFS INTO THE LUNGS EVERY 6 HOURS AS NEEDED FOR WHEEZING  Ask about: Which instructions should I use?            Indwelling Lines/Drains at time of discharge:   Lines/Drains/Airways     None                 Time spent on the discharge of patient: 35 minutes  Patient was seen and examined on the day of discharge and determined to be suitable for discharge.         Cookie De Luna NP  Department of Hospital Medicine  [facility]

## 2020-06-26 NOTE — PROGRESS NOTES
WRITTEN HEALTHCARE DISCHARGE INFORMATION      Things that YOU are RESPONSIBLE for to Manage Your Care At Home:     1. Getting your prescriptions filled.  2. Taking you medications as directed. DO NOT MISS ANY DOSES!  3. Going to your follow-up doctor appointments. This is important because it allows the doctor to monitor your progress and to determine if any changes need to be made to your treatment plan.     If you are unable to make your follow up appointments, please call the number listed and reschedule this appointment.      ____________HELP AT HOME____________________     Experiencing any SIGNS or SYMPTOMS: YOU CAN     Schedule a same day appopintment with your Primary Care Doctor or  you can call [facility] On [facility] Care Line for 24/7 assistance at [phone]     If you are experience any signs or symptoms that have become severe, Call 911 and come to your nearest Emergency Room.     Thank you for choosing [facility] and allowing us to care for you.   From your care management team:      You should receive a call from [facility] within 48-72 hours to help manage your care after discharge. Please try to make sure that you answer your phone for this important phone call.    Follow-up Information     Kirti Morris MD On 7/16/2020.    Specialty: Internal Medicine  Why: Appointment scheduled for July 16th at 10:40am  Contact information:  [address] [place] [address]  [phone]             Magaly Solano PA-C On 7/10/2020.    Specialty: Gastroenterology  Why: Appointment scheduled for July 10th at 12:15pm  Contact information:  [address]  [facility]  [place] [id number] [phone]

## 2020-06-26 NOTE — PLAN OF CARE
06/26/20 1429   Final Note   Assessment Type Final Discharge Note   Anticipated Discharge Disposition Home   Hospital Follow Up  Appt(s) scheduled? Yes   Discharge plans and expectations educations in teach back method with documentation complete? Yes   Right Care Referral Info   Post Acute Recommendation No Care   Post-Acute Status   Post-Acute Authorization Other   Other Status No Post-Acute Service Needs   Pts nurse Refugio notified that the pt can d/c from CM standpoint.

## 2020-06-26 NOTE — PROGRESS NOTES
Bedside report given to oncoming nurse NATHAN Humphrey noted. Pt lying in bed, Respirations even and unlabored. Safety maintained, Call light within reach.     24 hr chart check completed.

## 2020-06-26 NOTE — HOSPITAL COURSE
Mrs. Villegas placed observation for abdominal pain with one episode of bilious material on Sat (6 days ago) found on CT/US to mild prominence extrahepatic common duct with relative diminished calibar at the level of the pancreatic head.   Patient initially went to Urgent care for abdominal pain but was sent to hospital for abnormal EKG. EKG  TWI inferior leads, troponin neg x 2 and normal BNP. Denies chest pain or shortness of breath. 9/7/2019 NST no evidence of ischemia.   Patient remained afebrile, no leukocytosis, LFT's/ lipase/lactic acid normal. Abdominal pain improved with bowel rest but sore on palpation upper abdomen L>R. Denies recent weight lost besides recently since Friday due current pain10/30/18 cscope multiple polyps (not biospy; follow up in 3 yrs). MRCP showed prominence intrahepatic and extrahepatic without obstruction. Normal HIDA. GI recommended continue bentyl, protonix, and carafate. Patient will follow up GI for outpatient EUS. Tolerated full liquid advance to regular diet. Patient stable for discharge home. Encourage smoking cessation and avoid all NSAID.

## 2020-06-26 NOTE — DISCHARGE SUMMARY
"[facility] Medicine  Discharge Summary      Patient Name: Ariadna Villegas  MRN: [id number]  Admission Date: 6/24/2020  Hospital Length of Stay: 0 days  Discharge Date and Time:  06/26/2020 2:12 PM  Attending Physician: Tiffani Kilgore MD   Discharging Provider: Cookie De Luna NP  Primary Care Provider: Kirti Morris MD      HPI:   This is a 75 y.o female with medical history of HTN, non-small cell lung cancer s/p Wedge resection 2019, COPD and smoking who presents to the hospital complaining of acute onset of periumbilical abdominal pain x 5 days with associated symptoms of decreased appetite and fatigue. Patient reports her abdominal pain started last Friday 6/19/2020. She describes pain like "hurt" and around her umbilical area with intensity of 7-8/10. Pain comes and goes, and is worse with eating/drinking. Patient reports pain is relieved if she doesn't eat or drink. She doesn't eat or drink well x5 days due to pain and decreased appetite. She doesn't try anything for pain at home. She reports she tried to see her PCP but was not able to schedule her appointment. She went to an UC today 6/24/2020 and was sent to the ED from urgent care for an abnormal EKG. Her last normal BM was this AM. She does smoke cigarettes, around 7-10 cig/day. She is not currently receiving chemotherapy. She denies fever, chills, emesis, constipations, diarrhea, dysuria, difficulty urinating, chest pain, SOB, leg swelling, rash or any other associated symptoms. She does not consume EtOH or use illicit drugs.    In ED, COVID negative. Chest xray stable chronic  emphysematous change with minimal interstitial edema. Abdominal x ray with nonobstructive bowel gas pattern.     CT abdomen pelvis concerns extrahepatic common duct edema/inflmation, sludged gallbladder and/or cholelithiasis, and UTI/cystitis.     US Abdomen with no cholelithiasis or acute cholecystitis, but warranted ERCP for further evaluation " of extrahepatic common duct.     Labs with worsening renal function with elevated sCr 1.8, BUN 65. Bicarb is 19 with anion gap 19. BNP and trop negative. UA no infection with positive for bilirubin/ketone/protein.    Patient is placed in Observation with hospital medicine for further evaluation and treatment.        * No surgery found *      Hospital Course:   Mrs. Villegas placed observation for abdominal pain with one episode of bilious material on Sat (6 days ago) found on CT/US to mild prominence extrahepatic common duct with relative diminished calibar at the level of the pancreatic head.   Patient initially went to Urgent care for abdominal pain but was sent to hospital for abnormal EKG. EKG  TWI inferior leads, troponin neg x 2 and normal BNP. Denies chest pain or shortness of breath. 9/7/2019 NST no evidence of ischemia.   Patient remained afebrile, no leukocytosis, LFT's/ lipase/lactic acid normal. Abdominal pain improved with bowel rest but sore on palpation upper abdomen L>R. Denies recent weight lost besides recently since Friday due current pain10/30/18 cscope multiple polyps (not biospy; follow up in 3 yrs). MRCP showed prominence intrahepatic and extrahepatic without obstruction. Normal HIDA. GI recommended continue bentyl, protonix, and carafate. Patient will follow up GI for outpatient EUS. Tolerated full liquid advance to regular diet. Patient stable for discharge home. Encourage smoking cessation and avoid all NSAID.      Consults:   Consults (From admission, onward)        Status Ordering Provider     Inpatient consult to Gastroenterology  Once     Provider:  Bertha Ashley MD    Completed TABATHA PENN          No new Assessment & Plan notes have been filed under this hospital service since the last note was generated.  Service: Hospital Medicine    Final Active Diagnoses:    Diagnosis Date Noted POA    PRINCIPAL PROBLEM:  Abdominal pain [R10.9] 06/25/2020 Unknown    FERNANDO (acute kidney  injury) [N17.9] 06/25/2020 Yes    Cystitis [N30.90] 06/25/2020 Unknown    Tobacco use [Z72.0] 06/25/2020 Unknown    Squamous cell carcinoma of right lung [C34.91] 01/16/2019 Yes    Centrilobular emphysema [J43.2]  Yes     Chronic    Essential hypertension [I10] 01/12/2015 Yes     Chronic    COPD (chronic obstructive pulmonary disease) [J44.9] 01/12/2015 Yes     Chronic    CKD (chronic kidney disease) stage 3, GFR 30-59 ml/min [N18.3] 11/11/2014 Yes    Hyperlipidemia [E78.5] 11/05/2012 Yes     Chronic      Problems Resolved During this Admission:       Discharged Condition: good    Disposition: Home or Self Care    Follow Up:  Follow-up Information     Kirti Morris MD On 7/16/2020.    Specialty: Internal Medicine  Why: Appointment scheduled for July 16th at 10:40am  Contact information:  [address] [facility] [address]  [phone]             [facility] Locations.    Specialty: Gastroenterology  Contact information:  [address] [place] [address]  [address]  [facility] [address]  [phone]             Aaron Shearer MD.    Why: you will be contacted with your follow up appointment when scheduled by the office.  Contact information:  [address] [place] [address]  [address]  [facility] [address]  [phone]               Patient Instructions:      Diet Dysphagia Soft     Diet Cardiac     Diet Adult Regular     Notify your health care provider if you experience any of the following:  temperature >100.4     Notify your health care provider if you experience any of the following:  persistent nausea and vomiting or diarrhea     Notify your health care provider if you experience any of the following:  redness, tenderness, or signs of infection (pain, swelling, redness, odor or green/yellow discharge around incision site)     Notify your health care provider if you experience any of the following:  increased confusion or weakness     Notify your health care provider if you  experience any of the following:  temperature >100.4     Notify your health care provider if you experience any of the following:  difficulty breathing or increased cough     Notify your health care provider if you experience any of the following:  increased confusion or weakness     Activity as tolerated     Activity as tolerated       Significant Diagnostic Studies: Labs:     Pending Diagnostic Studies:     None         Medications:  Reconciled Home Medications:      Medication List      START taking these medications    dicyclomine 10 MG capsule  Commonly known as: BENTYL  Take 1 capsule (10 mg total) by mouth 3 (three) times daily.     pantoprazole 40 MG tablet  Commonly known as: PROTONIX  Take 1 tablet (40 mg total) by mouth once daily.     sucralfate 100 mg/mL suspension  Commonly known as: CARAFATE  Take 10 mLs (1 g total) by mouth 4 (four) times daily before meals and nightly.        CONTINUE taking these medications    albuterol 90 mcg/actuation inhaler  Commonly known as: PROVENTIL/VENTOLIN HFA  INHALE 2 PUFFS INTO THE LUNGS EVERY 6 HOURS AS NEEDED FOR WHEEZING     aspirin 81 MG EC tablet  Commonly known as: ECOTRIN  Take 1 tablet (81 mg total) by mouth once daily.     azelastine 137 mcg (0.1 %) nasal spray  Commonly known as: ASTELIN  1 spray (137 mcg total) by Nasal route 2 (two) times daily.     gabapentin 300 MG capsule  Commonly known as: NEURONTIN  Take 300 mg by mouth 3 (three) times daily.     metoprolol succinate 50 MG 24 hr tablet  Commonly known as: TOPROL-XL  Take 1 tablet (50 mg total) by mouth once daily.     rosuvastatin 20 MG tablet  Commonly known as: CRESTOR  Take 1 tablet (20 mg total) by mouth once daily.     tiotropium 18 mcg inhalation capsule  Commonly known as: SPIRIVA  Inhale 1 capsule (18 mcg total) into the lungs once daily. Controller        STOP taking these medications    hydrOXYzine HCL 25 MG tablet  Commonly known as: ATARAX     losartan-hydrochlorothiazide 100-25 mg 100-25  mg per tablet  Commonly known as: HYZAAR     methylPREDNISolone 4 mg tablet  Commonly known as: MEDROL DOSEPACK     oxyCODONE 5 MG immediate release tablet  Commonly known as: ROXICODONE     sertraline 50 MG tablet  Commonly known as: ZOLOFT     triamcinolone acetonide 0.1% 0.1 % cream  Commonly known as: KENALOG            Indwelling Lines/Drains at time of discharge:   Lines/Drains/Airways     None                 Time spent on the discharge of patient: 30 minutes  Patient was seen and examined on the date of discharge and determined to be suitable for discharge.         Cookie De Luna NP  Department of Hospital Medicine  [facility]

## 2020-06-26 NOTE — PROGRESS NOTES
Gastroenterology    CC: Abdominal pain    HPI 75 y.o. female with some persistent vague upper abd pain but somewhat better - wants to go home.       Past Medical History:   Diagnosis Date    Arthritis     neck and back    COPD (chronic obstructive pulmonary disease)     Malignant hypertension     Non-small cell lung cancer 12/26/2018    Osteoporosis, postmenopausal     Tobacco use disorder          Review of Systems  General ROS: negative for chills, fever   Cardiovascular ROS: no chest pain or dyspnea    Physical Examination  /60 (BP Location: Left arm, Patient Position: Lying)   Pulse 71   Temp 98.1 °F (36.7 °C) (Oral)   Resp 16   Ht 5' (1.524 m)   Wt 38.6 kg (85 lb)   SpO2 (!) 94%   Breastfeeding No   BMI 16.60 kg/m²   General appearance: alert, cooperative, no distress  HENT: Normocephalic, atraumatic, neck symmetrical, no nasal discharge   Eyes: conjunctivae/corneas clear, no icterus, EOM's intact  Lungs: resp non-labored  Heart: regular rate   Abdomen: soft, ND,  no organomegaly  Extremities: extremities symmetric; no clubbing, cyanosis, or edema  Neurologic: Alert and oriented X 3, MAEW    Labs:  Lipase/lfts/lactate/WBC all nl    Imaging:  US/CT/MRCP/HIDA all unrevealing for a source of pain    Assessment:     Vague upper abd pain - etiology not clear.  Seems somewhat better this AM and pt is asking to go home.     Plan:   Continue bentyl  Will add empiric PPI/carafate   If she tolerates full liquids this AM without issue and pain is controlled/improving, she can be discharged.

## 2020-06-29 ENCOUNTER — TELEPHONE (OUTPATIENT)
Dept: FAMILY MEDICINE | Facility: CLINIC | Age: 75
End: 2020-06-29

## 2020-06-29 NOTE — TELEPHONE ENCOUNTER
Did you try all clinics not just [facility], also ok to remove restrictions off my schedule but try to avoid urgent slots    Kirti Morris MD

## 2020-06-29 NOTE — TELEPHONE ENCOUNTER
----- Message from Raiza Apple sent at 6/29/2020  8:44 AM CDT -----    Name of Who is Calling:VIOLETA CERVANTES [[id number]]    What is the request in detail: Patient daughter would like a call back regarding plan oc care , states she needs to speak with Dr. Morris or NP today Please contact to further discuss and advise    Can the clinic reply by [facility]: No    What Number to Call Back [facility] not [facility]: [phone]

## 2020-06-29 NOTE — TELEPHONE ENCOUNTER
Spoke with Cici and scheduled an appt with Jose CARRINGTON, for tomorrow.  Patient verbalized understandings.

## 2020-06-29 NOTE — TELEPHONE ENCOUNTER
Spoke with Cici and she said, she spoke with someone here on Wed and no one didn't call her back.  Patient was taken to the urgent care, on Wed and a EKG was done and then the patient was sent to the ER.  Patient was admitted on 6/24/20 discharge on 6/26/20.  Patient blood pressure was high on Wed at the arrive of the ER visit.   Patient daughter states the ER after visit summary said to discontinue the Losartan and Vistaril.  I tried to schedule with an NP but no available appt's till 7/14/20.    Please advise

## 2020-07-07 ENCOUNTER — HOSPITAL ENCOUNTER (EMERGENCY)
Facility: HOSPITAL | Age: 75
Discharge: HOME | End: 2020-07-07
Attending: EMERGENCY MEDICINE
Payer: MEDICARE

## 2020-07-07 VITALS
HEIGHT: 60 IN | OXYGEN SATURATION: 99 % | WEIGHT: 91 LBS | TEMPERATURE: 99 F | DIASTOLIC BLOOD PRESSURE: 79 MMHG | HEART RATE: 74 BPM | SYSTOLIC BLOOD PRESSURE: 189 MMHG | BODY MASS INDEX: 17.87 KG/M2 | RESPIRATION RATE: 18 BRPM

## 2020-07-07 DIAGNOSIS — I10 HYPERTENSION, UNSPECIFIED TYPE: Primary | ICD-10-CM

## 2020-07-07 PROCEDURE — 99283 EMERGENCY DEPT VISIT LOW MDM: CPT | Mod: ER

## 2020-07-07 PROCEDURE — 25000003 PHARM REV CODE 250: Mod: ER | Performed by: EMERGENCY MEDICINE

## 2020-07-07 RX ORDER — METOPROLOL SUCCINATE 100 MG/1
100 TABLET, EXTENDED RELEASE ORAL DAILY
Qty: 30 TABLET | Refills: 11 | Status: SHIPPED | OUTPATIENT
Start: 2020-07-07 | End: 2020-07-14 | Stop reason: SDUPTHER

## 2020-07-07 RX ORDER — METOPROLOL TARTRATE 50 MG/1
50 TABLET ORAL
Status: COMPLETED | OUTPATIENT
Start: 2020-07-07 | End: 2020-07-07

## 2020-07-07 RX ADMIN — METOPROLOL TARTRATE 50 MG: 50 TABLET, FILM COATED ORAL at 07:07

## 2020-07-08 NOTE — ED PROVIDER NOTES
Encounter Date: 2020    SCRIBE #1 NOTE: I, Rosamaria Seth, am scribing for, and in the presence of,  Dr. Josesito Saez. I have scribed the following portions of the note - Other sections scribed: HPI, ROS, PE.       History     Chief Complaint   Patient presents with    HIGH BLOOD PRESSURE     HAS BEEN TO [facility] AND [facility] SINCE LAST WEEK FOR OTHER ISSUES, BUT HAS NOT BEEN ABLE TO CONTROL HER BP. LAST BP AT HOME /99. REPORTS THAT SHE IS HAVING BLURRED VISION.    Shortness of Breath     REPORTS THAT SHE HAS BEEN TESTED FOR COVID X 2 WITH NEGATIVE RESULTS. REPORTS SOB IS WORSE WITH EXERTION     CC: Hypertension     Ariadna Villegas is a 75 y.o. female who presents to the ED complaining of acute on chronic intermittent hypertension x 2 week ago. Patient reports she is currently on Metoprolol succinate 50mg and Losartan 100mg. Notes recent hospitalizations on 2020 and 2020. Patient concerned due to FMHx (brother and father) of CVA. Denies fever and chills. Denies CP and SOB. Denies visual disturbance. Denies abdominal pain and N/V/D. Denies rash.     The history is provided by the patient. No  was used.     Review of patient's allergies indicates:   Allergen Reactions    Lunesta [eszopiclone] Other (See Comments)     Sleep walking     Past Medical History:   Diagnosis Date    Arthritis     neck and back    COPD (chronic obstructive pulmonary disease)     Malignant hypertension     Non-small cell lung cancer 2018    Osteoporosis, postmenopausal     Tobacco use disorder      Past Surgical History:   Procedure Laterality Date     x3      HYSTERECTOMY      LUNG BIOPSY N/A 2018    Procedure: Biopsy-Lung;  Surgeon: [facility] Diagnostic Provider;  Location: [facility] OR [address] [place] [address];  Service: Radiology;  Laterality: N/A;    LUNG CANCER SURGERY Right 2019    PARTIAL HYSTERECTOMY      , after her third     SALPINGOOPHORECTOMY      , for a ovarian  cyst    THORACOSCOPIC WEDGE RESECTION OF LUNG Right 1/16/2019    Procedure: VATS, WITH WEDGE RESECTION, LUNG;  Surgeon: Celso Jewell MD;  Location: [facility] OR [address] [place] [address];  Service: Thoracic;  Laterality: Right;     Family History   Problem Relation Age of Onset    Heart disease Mother     Diabetes Mother     Stroke Father     Heart disease Sister     Anesthesia problems Neg Hx      Social History     Tobacco Use    Smoking status: Current Every Day Smoker     Packs/day: 0.15     Years: 60.00     Pack years: 9.00     Types: Cigarettes    Smokeless tobacco: Never Used    Tobacco comment: quit x 2-3 wks. ago   Substance Use Topics    Alcohol use: Yes     Comment: socially    Drug use: No     Review of Systems   Constitutional: Negative.  Negative for chills and fever.   HENT: Negative.  Negative for sore throat.    Eyes: Negative.  Negative for visual disturbance.   Respiratory: Negative.  Negative for cough and shortness of breath.    Cardiovascular: Negative.  Negative for chest pain.   Gastrointestinal: Negative.  Negative for abdominal pain, diarrhea, nausea and vomiting.   Endocrine: Negative.    Genitourinary: Negative.  Negative for dysuria.   Musculoskeletal: Negative.  Negative for myalgias.   Skin: Negative.  Negative for rash.   Allergic/Immunologic: Negative.    Neurological: Negative.  Negative for dizziness and headaches.   Hematological: Negative.  Negative for adenopathy.   Psychiatric/Behavioral: Negative.  Negative for behavioral problems.   All other systems reviewed and are negative.      Physical Exam     Initial Vitals [07/07/20 1916]   BP Pulse Resp Temp SpO2   (!) 215/87 80 18 98.7 °F (37.1 °C) 99 %      MAP       --         Physical Exam    Nursing note and vitals reviewed.  Constitutional: She appears well-developed and well-nourished. No distress.   HENT:   Head: Normocephalic and atraumatic.   Right Ear: External ear normal.   Left Ear: External ear normal.   Nose: Nose normal.    Eyes: Conjunctivae and EOM are normal.   Neck: Normal range of motion. Neck supple.   Cardiovascular: Normal rate, regular rhythm, normal heart sounds and intact distal pulses. Exam reveals no gallop and no friction rub.    No murmur heard.  Pulmonary/Chest: Effort normal and breath sounds normal. No respiratory distress. She has no wheezes. She has no rhonchi. She has no rales.   Abdominal: Soft. There is no abdominal tenderness.   Musculoskeletal: Normal range of motion.   Neurological: She is alert and oriented to person, place, and time. GCS score is 15. GCS eye subscore is 4. GCS verbal subscore is 5. GCS motor subscore is 6.   Skin: Skin is warm and dry. Capillary refill takes less than 2 seconds. No rash noted.   Psychiatric: She has a normal mood and affect. Her behavior is normal.         ED Course   Procedures  Labs Reviewed - No data to display       Imaging Results    None          Medical Decision Making:   History:   Old Medical Records: I decided to obtain old medical records.            Scribe Attestation:   Scribe #1: I performed the above scribed service and the documentation accurately describes the services I performed. I attest to the accuracy of the note.               This document was produced by a scribe under my direction and in my presence. I agree with the content of the note and have made any necessary edits.     Josesito Saez MD    07/08/2020 6:40 AM           Clinical Impression:     1. Hypertension, unspecified type                ED Disposition Condition    Discharge Stable        ED Prescriptions     Medication Sig Dispense Start Date End Date Auth. Provider    metoprolol succinate (TOPROL-XL) 100 MG 24 hr tablet Take 1 tablet (100 mg total) by mouth once daily. 30 tablet 7/7/2020 7/7/2021 Josesito Saez MD        Follow-up Information     Follow up With Specialties Details Why Contact Info    Kirti Morris MD Internal Medicine Schedule an appointment as soon as  possible for a visit in 1 week  [address] [facility]  [place] [address]  [phone]                                       Josesito Saez MD  07/08/20 [id number]

## 2020-07-08 NOTE — DISCHARGE INSTRUCTIONS
Take you new dose of Toprol XL as prescribed.  Monitor your blood pressure morning noon and night keep a diary or log of your blood pressures.  Bring these and when you follow-up with your doctor in one week.

## 2020-07-10 ENCOUNTER — LAB VISIT (OUTPATIENT)
Dept: LAB | Facility: HOSPITAL | Age: 75
End: 2020-07-10
Attending: INTERNAL MEDICINE
Payer: MEDICARE

## 2020-07-10 DIAGNOSIS — C34.91 MALIGNANT NEOPLASM OF RIGHT LUNG, UNSPECIFIED PART OF LUNG: ICD-10-CM

## 2020-07-10 DIAGNOSIS — J44.9 CHRONIC OBSTRUCTIVE PULMONARY DISEASE, UNSPECIFIED COPD TYPE: ICD-10-CM

## 2020-07-10 LAB
ALBUMIN SERPL BCP-MCNC: 3.6 G/DL (ref 3.5–5.2)
ALP SERPL-CCNC: 129 U/L (ref 55–135)
ALT SERPL W/O P-5'-P-CCNC: 10 U/L (ref 10–44)
ANION GAP SERPL CALC-SCNC: 8 MMOL/L (ref 8–16)
AST SERPL-CCNC: 17 U/L (ref 10–40)
BASOPHILS # BLD AUTO: 0.04 K/UL (ref 0–0.2)
BASOPHILS NFR BLD: 0.5 % (ref 0–1.9)
BILIRUB SERPL-MCNC: 0.1 MG/DL (ref 0.1–1)
BUN SERPL-MCNC: 21 MG/DL (ref 8–23)
CALCIUM SERPL-MCNC: 9.7 MG/DL (ref 8.7–10.5)
CHLORIDE SERPL-SCNC: 100 MMOL/L (ref 95–110)
CO2 SERPL-SCNC: 29 MMOL/L (ref 23–29)
CREAT SERPL-MCNC: 1 MG/DL (ref 0.5–1.4)
DIFFERENTIAL METHOD: ABNORMAL
EOSINOPHIL # BLD AUTO: 0.3 K/UL (ref 0–0.5)
EOSINOPHIL NFR BLD: 3 % (ref 0–8)
ERYTHROCYTE [DISTWIDTH] IN BLOOD BY AUTOMATED COUNT: 14.1 % (ref 11.5–14.5)
EST. GFR  (AFRICAN AMERICAN): >60 ML/MIN/1.73 M^2
EST. GFR  (NON AFRICAN AMERICAN): 55 ML/MIN/1.73 M^2
GLUCOSE SERPL-MCNC: 112 MG/DL (ref 70–110)
HCT VFR BLD AUTO: 31.9 % (ref 37–48.5)
HGB BLD-MCNC: 10.1 G/DL (ref 12–16)
IMM GRANULOCYTES # BLD AUTO: 0.03 K/UL (ref 0–0.04)
IMM GRANULOCYTES NFR BLD AUTO: 0.4 % (ref 0–0.5)
LYMPHOCYTES # BLD AUTO: 2.2 K/UL (ref 1–4.8)
LYMPHOCYTES NFR BLD: 26.3 % (ref 18–48)
MCH RBC QN AUTO: 30.2 PG (ref 27–31)
MCHC RBC AUTO-ENTMCNC: 31.7 G/DL (ref 32–36)
MCV RBC AUTO: 96 FL (ref 82–98)
MONOCYTES # BLD AUTO: 0.5 K/UL (ref 0.3–1)
MONOCYTES NFR BLD: 6.6 % (ref 4–15)
NEUTROPHILS # BLD AUTO: 5.2 K/UL (ref 1.8–7.7)
NEUTROPHILS NFR BLD: 63.2 % (ref 38–73)
NRBC BLD-RTO: 0 /100 WBC
PLATELET # BLD AUTO: 279 K/UL (ref 150–350)
PMV BLD AUTO: 9.4 FL (ref 9.2–12.9)
POTASSIUM SERPL-SCNC: 4.4 MMOL/L (ref 3.5–5.1)
PROT SERPL-MCNC: 7.2 G/DL (ref 6–8.4)
RBC # BLD AUTO: 3.34 M/UL (ref 4–5.4)
SODIUM SERPL-SCNC: 137 MMOL/L (ref 136–145)
WBC # BLD AUTO: 8.2 K/UL (ref 3.9–12.7)

## 2020-07-10 PROCEDURE — 36415 COLL VENOUS BLD VENIPUNCTURE: CPT

## 2020-07-10 PROCEDURE — 85025 COMPLETE CBC W/AUTO DIFF WBC: CPT

## 2020-07-10 PROCEDURE — 80053 COMPREHEN METABOLIC PANEL: CPT

## 2020-07-11 NOTE — PROGRESS NOTES
Chief Complaint :  Lung cancer    Hx of Present illness :  Patient is a 75 y.o. year old female who presents to the clinic today for  Oncology followup.. Initially had sx of bronchitis.  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 Patient has undergone Wedge resection.  Lymph nodes were negative for mets. Was having sx of back pain.  MRI  Spine showed small indeterminate focus of uptake in L2.  On prolia therapy for osteopenia.  Was hospitalised last week for severe diarrhoea.  Had EGD on 2020. Histologic Exam showed Chronic gastritis with intestinal metaplasia. H.Pylori Neg.   Has not smoked in 4 days    Allergies :    Review of patient's allergies indicates:   Allergen Reactions    Lunesta [eszopiclone] Other (See Comments)     Sleep walking       Occupation :  Cleaning Houses    Transfusion :  None    Menstrual & obstetric Hx :   3; para 3.  Age of menarche: 15  Age of first pregnancy: 20  Lactation history: No  Age of menopause:  Hysterectomy at age 31  HRT:  Short time    Present Meds :   Medication List with Changes/Refills   Current Medications    ALBUTEROL (PROVENTIL/VENTOLIN HFA) 90 MCG/ACTUATION INHALER    INHALE 2 PUFFS INTO THE LUNGS EVERY 6 HOURS AS NEEDED FOR WHEEZING    ASPIRIN (ECOTRIN) 81 MG EC TABLET    Take 1 tablet (81 mg total) by mouth once daily.    AZELASTINE (ASTELIN) 137 MCG (0.1 %) NASAL SPRAY    1 spray (137 mcg total) by Nasal route 2 (two) times daily.    DICYCLOMINE (BENTYL) 10 MG CAPSULE    Take 1 capsule (10 mg total) by mouth 3 (three) times daily.    GABAPENTIN (NEURONTIN) 300 MG CAPSULE    Take 300 mg by mouth 3 (three) times daily.     METOPROLOL SUCCINATE (TOPROL-XL) 100 MG 24 HR TABLET    Take 1 tablet (100 mg total) by mouth once daily.    METOPROLOL SUCCINATE (TOPROL-XL) 50 MG 24 HR TABLET    Take 1 tablet (50 mg total) by mouth once daily.    PANTOPRAZOLE (PROTONIX) 40 MG TABLET    Take 1  tablet (40 mg total) by mouth once daily.    ROSUVASTATIN (CRESTOR) 20 MG TABLET    Take 1 tablet (20 mg total) by mouth once daily.    SUCRALFATE (CARAFATE) 100 MG/ML SUSPENSION    Take 10 mLs (1 g total) by mouth 4 (four) times daily before meals and nightly.    TIOTROPIUM (SPIRIVA) 18 MCG INHALATION CAPSULE    Inhale 1 capsule (18 mcg total) into the lungs once daily. Controller       Past Medical Hx :  No hx of DVT or PE..  No hx of DM, PUD, Hepatitis, Yhyroid dysfunction, CVA , seizure disorder.    Past Medical Hx :  Past Medical History:   Diagnosis Date    Arthritis     neck and back    COPD (chronic obstructive pulmonary disease)     Malignant hypertension     Non-small cell lung cancer 12/26/2018    Osteoporosis, postmenopausal     Tobacco use disorder        Travel Hx :  N/A    Immunization :  Immunization History   Administered Date(s) Administered    Pneumococcal Conjugate - 13 Valent 11/14/2016    Pneumococcal Polysaccharide - 23 Valent 10/14/2013       Family Hx :  Family History   Problem Relation Age of Onset    Heart disease Mother     Diabetes Mother     Stroke Father     Heart disease Sister     Anesthesia problems Neg Hx        Social Hx :  Social History     Socioeconomic History    Marital status:      Spouse name: Not on file    Number of children: Not on file    Years of education: Not on file    Highest education level: Not on file   Occupational History    Occupation: Retired   Social Needs    Financial resource strain: Not on file    Food insecurity     Worry: Not on file     Inability: Not on file    Transportation needs     Medical: Not on file     Non-medical: Not on file   Tobacco Use    Smoking status: Current Every Day Smoker     Packs/day: 0.15     Years: 60.00     Pack years: 9.00     Types: Cigarettes    Smokeless tobacco: Never Used    Tobacco comment: quit x 2-3 wks. ago   Substance and Sexual Activity    Alcohol use: Yes     Comment: socially     Drug use: No    Sexual activity: Never     Partners: Male     Comment:    Lifestyle    Physical activity     Days per week: Not on file     Minutes per session: Not on file    Stress: Not on file   Relationships    Social connections     Talks on phone: Not on file     Gets together: Not on file     Attends Taoism service: Not on file     Active member of club or organization: Not on file     Attends meetings of clubs or organizations: Not on file     Relationship status: Not on file   Other Topics Concern    Not on file   Social History Narrative    Not on file       Surgery : C.Section x 3; Hysterectomy ; Bilateral cataract surgery.  Colonoscopy 2018.     Symptoms :    Review of Systems   Constitutional: Positive for malaise/fatigue. Negative for chills, diaphoresis, fever and weight loss (fluctuates.).   HENT: Negative for congestion, hearing loss, nosebleeds, sore throat and tinnitus.    Eyes: Negative for blurred vision, double vision and photophobia.   Respiratory: Positive for cough and shortness of breath.    Cardiovascular: Negative for chest pain, palpitations, orthopnea, claudication and leg swelling.   Gastrointestinal: Positive for diarrhea. Negative for abdominal pain, blood in stool, constipation, heartburn, nausea and vomiting.   Genitourinary: Negative for dysuria, flank pain, frequency, hematuria and urgency.   Musculoskeletal: Positive for back pain. Negative for falls, joint pain, myalgias and neck pain.   Skin: Negative for itching and rash.   Neurological: Positive for dizziness and headaches. Negative for tingling, tremors and sensory change.   Endo/Heme/Allergies: Negative for environmental allergies. Does not bruise/bleed easily.   Psychiatric/Behavioral: Negative for depression. The patient has insomnia. The patient is not nervous/anxious.        Physical Exam :   present.   Physical Exam   Constitutional: She is oriented to person, place, and time and well-developed,  well-nourished, and in no distress. Vital signs are normal. No distress.   HENT:   Head: Normocephalic and atraumatic.   Right Ear: External ear normal.   Left Ear: External ear normal.   Nose: Nose normal.   Mouth/Throat: Oropharynx is clear and moist. No oropharyngeal exudate.   Eyes: Conjunctivae, EOM and lids are normal. Lids are everted and swept, no foreign bodies found. Right eye exhibits no discharge. Left eye exhibits no discharge. No scleral icterus.   Neck: Trachea normal, normal range of motion, full passive range of motion without pain and phonation normal. Neck supple. Normal carotid pulses, no hepatojugular reflux and no JVD present. No tracheal tenderness present. Carotid bruit is not present. No tracheal deviation present. No thyroid mass and no thyromegaly present.   Cardiovascular: Normal rate, regular rhythm, normal heart sounds, intact distal pulses and normal pulses. PMI is not displaced. Exam reveals no gallop and no friction rub.   No murmur heard.  Pulmonary/Chest: Effort normal and breath sounds normal. No stridor. No apnea. No respiratory distress. She has no wheezes. She has no rales. She exhibits no tenderness.   Abdominal: Soft. Normal appearance. She exhibits no distension, no ascites and no mass. There is no abdominal tenderness. There is no rebound, no guarding and no CVA tenderness. No hernia.   Musculoskeletal: Normal range of motion.         General: No tenderness, deformity or edema.   Lymphadenopathy:        Head (right side): No submental, no submandibular, no tonsillar, no preauricular, no posterior auricular and no occipital adenopathy present.        Head (left side): No submental, no submandibular, no tonsillar, no preauricular, no posterior auricular and no occipital adenopathy present.     She has no cervical adenopathy.     She has no axillary adenopathy.        Right: No supraclavicular and no epitrochlear adenopathy present.        Left: No supraclavicular and no  epitrochlear adenopathy present.   Neurological: She is alert and oriented to person, place, and time. She has normal sensation, normal strength, normal reflexes and intact cranial nerves. No cranial nerve deficit. She exhibits normal muscle tone. Gait normal. Coordination normal. GCS score is 15.   Skin: Skin is warm, dry and intact. No rash noted. She is not diaphoretic. No cyanosis or erythema. No pallor. Nails show no clubbing.   Psychiatric: Mood, memory, affect and judgment normal.   Nursing note and vitals reviewed.    Labs & Imaging : 07/10/2020 : NFBS 112; Cr.1.0; eGFR 55; Ca 9.7; Bili 0.6; liver enzymes normal.  Hgb 10.1; Hct 31.9; MCV 96; Platelets 279,000 ANC 5,200         01/08/20 : Na 127; K 5.2; Cl 94; cr. 1.1; ca 9.7; Bili 0.2. . eGFR 56.8  Hgb 12.2; Hct 38.1. MCV 91; Platelets 292,000 ANC 3,600  01/15/20 : CT Chest : no mass, adenopathy or effusion.    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  PTis,pN0.    Assessment & Plan:  Reviewed with patient .   Followup with  for cough.  GI followup. .. Continue prolia.  Oncology followup in 4 months

## 2020-07-14 ENCOUNTER — INFUSION (OUTPATIENT)
Dept: INFUSION THERAPY | Facility: HOSPITAL | Age: 75
End: 2020-07-14
Attending: INTERNAL MEDICINE
Payer: MEDICARE

## 2020-07-14 ENCOUNTER — OFFICE VISIT (OUTPATIENT)
Dept: HEMATOLOGY/ONCOLOGY | Facility: CLINIC | Age: 75
End: 2020-07-14
Payer: MEDICARE

## 2020-07-14 VITALS
SYSTOLIC BLOOD PRESSURE: 190 MMHG | OXYGEN SATURATION: 96 % | TEMPERATURE: 99 F | HEART RATE: 75 BPM | RESPIRATION RATE: 17 BRPM | DIASTOLIC BLOOD PRESSURE: 82 MMHG | HEIGHT: 60 IN | DIASTOLIC BLOOD PRESSURE: 77 MMHG | WEIGHT: 89.75 LBS | TEMPERATURE: 98 F | BODY MASS INDEX: 17.62 KG/M2 | SYSTOLIC BLOOD PRESSURE: 174 MMHG | HEART RATE: 79 BPM | OXYGEN SATURATION: 95 %

## 2020-07-14 DIAGNOSIS — N18.30 CKD (CHRONIC KIDNEY DISEASE) STAGE 3, GFR 30-59 ML/MIN: ICD-10-CM

## 2020-07-14 DIAGNOSIS — M85.88 OSTEOPENIA OF LUMBAR SPINE: ICD-10-CM

## 2020-07-14 DIAGNOSIS — J44.9 CHRONIC OBSTRUCTIVE PULMONARY DISEASE, UNSPECIFIED COPD TYPE: ICD-10-CM

## 2020-07-14 DIAGNOSIS — M85.88 OSTEOPENIA OF LUMBAR SPINE: Primary | ICD-10-CM

## 2020-07-14 DIAGNOSIS — C34.91 MALIGNANT NEOPLASM OF RIGHT LUNG, UNSPECIFIED PART OF LUNG: Primary | ICD-10-CM

## 2020-07-14 PROCEDURE — 3079F DIAST BP 80-89 MM HG: CPT | Mod: CPTII,S$GLB,, | Performed by: INTERNAL MEDICINE

## 2020-07-14 PROCEDURE — 99999 PR PBB SHADOW E&M-EST. PATIENT-LVL III: ICD-10-PCS | Mod: PBBFAC,,, | Performed by: INTERNAL MEDICINE

## 2020-07-14 PROCEDURE — 1126F AMNT PAIN NOTED NONE PRSNT: CPT | Mod: S$GLB,,, | Performed by: INTERNAL MEDICINE

## 2020-07-14 PROCEDURE — 3079F PR MOST RECENT DIASTOLIC BLOOD PRESSURE 80-89 MM HG: ICD-10-PCS | Mod: CPTII,S$GLB,, | Performed by: INTERNAL MEDICINE

## 2020-07-14 PROCEDURE — 99213 OFFICE O/P EST LOW 20 MIN: CPT | Mod: S$GLB,,, | Performed by: INTERNAL MEDICINE

## 2020-07-14 PROCEDURE — 1101F PT FALLS ASSESS-DOCD LE1/YR: CPT | Mod: CPTII,S$GLB,, | Performed by: INTERNAL MEDICINE

## 2020-07-14 PROCEDURE — 3077F SYST BP >= 140 MM HG: CPT | Mod: CPTII,S$GLB,, | Performed by: INTERNAL MEDICINE

## 2020-07-14 PROCEDURE — 96372 THER/PROPH/DIAG INJ SC/IM: CPT

## 2020-07-14 PROCEDURE — 1101F PR PT FALLS ASSESS DOC 0-1 FALLS W/OUT INJ PAST YR: ICD-10-PCS | Mod: CPTII,S$GLB,, | Performed by: INTERNAL MEDICINE

## 2020-07-14 PROCEDURE — 63600175 PHARM REV CODE 636 W HCPCS: Mod: JG | Performed by: INTERNAL MEDICINE

## 2020-07-14 PROCEDURE — 1159F MED LIST DOCD IN RCRD: CPT | Mod: S$GLB,,, | Performed by: INTERNAL MEDICINE

## 2020-07-14 PROCEDURE — 3077F PR MOST RECENT SYSTOLIC BLOOD PRESSURE >= 140 MM HG: ICD-10-PCS | Mod: CPTII,S$GLB,, | Performed by: INTERNAL MEDICINE

## 2020-07-14 PROCEDURE — 1126F PR PAIN SEVERITY QUANTIFIED, NO PAIN PRESENT: ICD-10-PCS | Mod: S$GLB,,, | Performed by: INTERNAL MEDICINE

## 2020-07-14 PROCEDURE — 99999 PR PBB SHADOW E&M-EST. PATIENT-LVL III: CPT | Mod: PBBFAC,,, | Performed by: INTERNAL MEDICINE

## 2020-07-14 PROCEDURE — 1159F PR MEDICATION LIST DOCUMENTED IN MEDICAL RECORD: ICD-10-PCS | Mod: S$GLB,,, | Performed by: INTERNAL MEDICINE

## 2020-07-14 PROCEDURE — 99213 PR OFFICE/OUTPT VISIT, EST, LEVL III, 20-29 MIN: ICD-10-PCS | Mod: S$GLB,,, | Performed by: INTERNAL MEDICINE

## 2020-07-14 RX ORDER — LOSARTAN POTASSIUM AND HYDROCHLOROTHIAZIDE 25; 100 MG/1; MG/1
1 TABLET ORAL DAILY
COMMUNITY

## 2020-07-14 RX ADMIN — DENOSUMAB 60 MG: 60 INJECTION SUBCUTANEOUS at 01:07

## 2020-07-14 NOTE — PLAN OF CARE
Patient received Prolia injection. Tolerated well. VSS. Patient reports taking Vitamin D and Calcium supplements as directed for bone health. Serum Calcium levels remain WNL at this time. She denies any recent falls, jaw pain or teeth extractions within the last 3 months. Patient received discharge instructions and follow up appointments. Verbalized understanding and ambulated unassisted off unit by self. Patient in NAD at time of discharge.

## 2020-08-04 RX ORDER — GABAPENTIN 300 MG/1
300 CAPSULE ORAL 3 TIMES DAILY
Qty: 90 CAPSULE | Refills: 6 | Status: SHIPPED | OUTPATIENT
Start: 2020-08-04 | End: 2021-03-01

## 2020-09-14 LAB
LEFT EYE DM RETINOPATHY: NEGATIVE
RIGHT EYE DM RETINOPATHY: NEGATIVE

## 2020-10-06 ENCOUNTER — TELEPHONE (OUTPATIENT)
Dept: OPHTHALMOLOGY | Facility: CLINIC | Age: 75
End: 2020-10-06

## 2020-10-06 NOTE — TELEPHONE ENCOUNTER
Spoke to mery and scheduled patient to be seen on 10/23 with Dr. Lanre TUCKER     ----- Message from Ashley Casarez sent at 10/6/2020  8:55 AM CDT -----  Contact: Pt @ [phone]  [facility] is calling to refer this pt and wanted to make the dr aware of the referral being submitted. I let them know that due to the pts age, the dr may not see her and would recommend who can if this is the case.

## 2020-11-12 ENCOUNTER — PATIENT OUTREACH (OUTPATIENT)
Dept: ADMINISTRATIVE | Facility: OTHER | Age: 75
End: 2020-11-12

## 2020-11-13 ENCOUNTER — OFFICE VISIT (OUTPATIENT)
Dept: OPHTHALMOLOGY | Facility: CLINIC | Age: 75
End: 2020-11-13
Payer: MEDICARE

## 2020-11-13 DIAGNOSIS — H50.51 ESOPHORIA: Primary | ICD-10-CM

## 2020-11-13 DIAGNOSIS — H50.22 HYPERTROPIA OF LEFT EYE: ICD-10-CM

## 2020-11-13 PROCEDURE — 1126F PR PAIN SEVERITY QUANTIFIED, NO PAIN PRESENT: ICD-10-PCS | Mod: S$GLB,,, | Performed by: STUDENT IN AN ORGANIZED HEALTH CARE EDUCATION/TRAINING PROGRAM

## 2020-11-13 PROCEDURE — 1126F AMNT PAIN NOTED NONE PRSNT: CPT | Mod: S$GLB,,, | Performed by: STUDENT IN AN ORGANIZED HEALTH CARE EDUCATION/TRAINING PROGRAM

## 2020-11-13 PROCEDURE — 99999 PR PBB SHADOW E&M-EST. PATIENT-LVL III: ICD-10-PCS | Mod: PBBFAC,,, | Performed by: STUDENT IN AN ORGANIZED HEALTH CARE EDUCATION/TRAINING PROGRAM

## 2020-11-13 PROCEDURE — 3288F PR FALLS RISK ASSESSMENT DOCUMENTED: ICD-10-PCS | Mod: CPTII,S$GLB,, | Performed by: STUDENT IN AN ORGANIZED HEALTH CARE EDUCATION/TRAINING PROGRAM

## 2020-11-13 PROCEDURE — 1101F PR PT FALLS ASSESS DOC 0-1 FALLS W/OUT INJ PAST YR: ICD-10-PCS | Mod: CPTII,S$GLB,, | Performed by: STUDENT IN AN ORGANIZED HEALTH CARE EDUCATION/TRAINING PROGRAM

## 2020-11-13 PROCEDURE — 92060 PR SPECIAL EYE EVAL,SENSORIMOTOR: ICD-10-PCS | Mod: S$GLB,,, | Performed by: STUDENT IN AN ORGANIZED HEALTH CARE EDUCATION/TRAINING PROGRAM

## 2020-11-13 PROCEDURE — 92060 SENSORIMOTOR EXAMINATION: CPT | Mod: S$GLB,,, | Performed by: STUDENT IN AN ORGANIZED HEALTH CARE EDUCATION/TRAINING PROGRAM

## 2020-11-13 PROCEDURE — 1101F PT FALLS ASSESS-DOCD LE1/YR: CPT | Mod: CPTII,S$GLB,, | Performed by: STUDENT IN AN ORGANIZED HEALTH CARE EDUCATION/TRAINING PROGRAM

## 2020-11-13 PROCEDURE — 99999 PR PBB SHADOW E&M-EST. PATIENT-LVL III: CPT | Mod: PBBFAC,,, | Performed by: STUDENT IN AN ORGANIZED HEALTH CARE EDUCATION/TRAINING PROGRAM

## 2020-11-13 PROCEDURE — 92002 INTRM OPH EXAM NEW PATIENT: CPT | Mod: S$GLB,,, | Performed by: STUDENT IN AN ORGANIZED HEALTH CARE EDUCATION/TRAINING PROGRAM

## 2020-11-13 PROCEDURE — 92002 PR EYE EXAM, NEW PATIENT,INTERMED: ICD-10-PCS | Mod: S$GLB,,, | Performed by: STUDENT IN AN ORGANIZED HEALTH CARE EDUCATION/TRAINING PROGRAM

## 2020-11-13 PROCEDURE — 3288F FALL RISK ASSESSMENT DOCD: CPT | Mod: CPTII,S$GLB,, | Performed by: STUDENT IN AN ORGANIZED HEALTH CARE EDUCATION/TRAINING PROGRAM

## 2020-11-13 RX ORDER — PREDNISONE 10 MG/1
TABLET ORAL
COMMUNITY
Start: 2020-09-22

## 2020-11-13 RX ORDER — TEMAZEPAM 15 MG/1
15 CAPSULE ORAL
COMMUNITY
Start: 2020-10-20 | End: 2020-11-19

## 2020-11-13 RX ORDER — OXYCODONE HYDROCHLORIDE 5 MG/1
TABLET ORAL
COMMUNITY
Start: 2020-11-04

## 2020-11-13 RX ORDER — DOXYCYCLINE 100 MG/1
TABLET ORAL
COMMUNITY
Start: 2020-09-22

## 2020-11-13 NOTE — PROGRESS NOTES
HPI     76 yo presents today for doubled vision intermittently, pt was referred   by Dr. Linder on the [facility]. Pt states she does not notice any turning   of the eyes     Last edited by Jocelyn Mejía on 11/13/2020  2:21 PM. (History)            Assessment /Plan     For exam results, see Encounter Report.    Esophoria    Hypertropia of left eye      Discussed findings with patient today   Minimal amount of deviation noted.  Good movement.   Can prescribe very small amount of fresnel prism for patient to try as she was adamant this was an improvement. Her main complaint seems to be blurry vision. Will refer to optom for eval for new glasses/invesitage blurry vision further.     RTC PRN      This service was scribed by Jocelyn Mejía for and in the presence of Dr. De Dios who personally performed this service.    Jocelyn Mejía COA    Tiffanie De Dios MD

## 2020-12-01 ENCOUNTER — TELEPHONE (OUTPATIENT)
Dept: OPHTHALMOLOGY | Facility: CLINIC | Age: 75
End: 2020-12-01

## 2020-12-01 NOTE — TELEPHONE ENCOUNTER
ASHLEEM for mery to call back. Sent message to Dr. Lee's staff at [facility] to reach out to patient to schedule     -TD     ----- Message from Kassie Jane sent at 12/1/2020  3:09 PM CST -----  Regarding: Referral  Contact: PT @ [phone]  Pt stating she does not remember who we were suppose to refer her to on the [facility] in regards to her blurred vision and it is getting worse and has not received a call back. I looked through notes but only saw [facility] and was not sure.     Pt stated she needs to speak with staff in regards to setting up appt or getting details on the other provider.    Call back: [phone]

## 2020-12-18 ENCOUNTER — PATIENT OUTREACH (OUTPATIENT)
Dept: ADMINISTRATIVE | Facility: HOSPITAL | Age: 75
End: 2020-12-18

## 2020-12-18 RX ORDER — FLUTICASONE PROPIONATE 50 MCG
SPRAY, SUSPENSION (ML) NASAL
COMMUNITY
Start: 2020-11-17

## 2020-12-18 RX ORDER — FLUTICASONE PROPIONATE AND SALMETEROL 250; 50 UG/1; UG/1
POWDER RESPIRATORY (INHALATION)
COMMUNITY
Start: 2020-11-17

## 2020-12-18 RX ORDER — DOXYCYCLINE 100 MG/1
CAPSULE ORAL
COMMUNITY
Start: 2020-11-17

## 2020-12-18 RX ORDER — FLUTICASONE PROPIONATE 50 MCG
1 SPRAY, SUSPENSION (ML) NASAL
COMMUNITY
Start: 2020-11-17 | End: 2021-11-17

## 2021-01-17 ENCOUNTER — PATIENT OUTREACH (OUTPATIENT)
Dept: ADMINISTRATIVE | Facility: OTHER | Age: 76
End: 2021-01-17

## 2021-02-09 ENCOUNTER — OFFICE VISIT (OUTPATIENT)
Dept: OPTOMETRY | Facility: CLINIC | Age: 76
End: 2021-02-09
Payer: MEDICARE

## 2021-02-09 DIAGNOSIS — H04.123 DRY EYE SYNDROME, BILATERAL: Primary | ICD-10-CM

## 2021-02-09 DIAGNOSIS — H50.22 HYPERTROPIA OF LEFT EYE: ICD-10-CM

## 2021-02-09 DIAGNOSIS — Z96.1 PSEUDOPHAKIA OF BOTH EYES: ICD-10-CM

## 2021-02-09 DIAGNOSIS — H26.491 PCO (POSTERIOR CAPSULAR OPACIFICATION), RIGHT: ICD-10-CM

## 2021-02-09 DIAGNOSIS — H53.2 DIPLOPIA: ICD-10-CM

## 2021-02-09 DIAGNOSIS — H35.3130 BILATERAL NONEXUDATIVE AGE-RELATED MACULAR DEGENERATION, UNSPECIFIED STAGE: ICD-10-CM

## 2021-02-09 DIAGNOSIS — H52.4 PRESBYOPIA: ICD-10-CM

## 2021-02-09 DIAGNOSIS — I10 ESSENTIAL HYPERTENSION: Chronic | ICD-10-CM

## 2021-02-09 DIAGNOSIS — H50.51 ESOPHORIA: ICD-10-CM

## 2021-02-09 PROCEDURE — 92004 COMPRE OPH EXAM NEW PT 1/>: CPT | Mod: S$GLB,,, | Performed by: OPTOMETRIST

## 2021-02-09 PROCEDURE — 92015 PR REFRACTION: ICD-10-PCS | Mod: S$GLB,,, | Performed by: OPTOMETRIST

## 2021-02-09 PROCEDURE — 99999 PR PBB SHADOW E&M-EST. PATIENT-LVL I: CPT | Mod: PBBFAC,,, | Performed by: OPTOMETRIST

## 2021-02-09 PROCEDURE — 99999 PR PBB SHADOW E&M-EST. PATIENT-LVL I: ICD-10-PCS | Mod: PBBFAC,,, | Performed by: OPTOMETRIST

## 2021-02-09 PROCEDURE — 92015 DETERMINE REFRACTIVE STATE: CPT | Mod: S$GLB,,, | Performed by: OPTOMETRIST

## 2021-02-09 PROCEDURE — 92004 PR EYE EXAM, NEW PATIENT,COMPREHESV: ICD-10-PCS | Mod: S$GLB,,, | Performed by: OPTOMETRIST

## 2021-02-09 PROCEDURE — 92134 POSTERIOR SEGMENT OCT RETINA (OCULAR COHERENCE TOMOGRAPHY)-BOTH EYES: ICD-10-PCS | Mod: S$GLB,,, | Performed by: OPTOMETRIST

## 2021-02-09 PROCEDURE — 92134 CPTRZ OPH DX IMG PST SGM RTA: CPT | Mod: S$GLB,,, | Performed by: OPTOMETRIST

## 2021-02-09 RX ORDER — CYCLOSPORINE 0.5 MG/ML
1 EMULSION OPHTHALMIC 2 TIMES DAILY
Qty: 180 VIAL | Refills: 3 | Status: SHIPPED | OUTPATIENT
Start: 2021-02-09 | End: 2022-02-09

## 2021-02-15 ENCOUNTER — INFUSION (OUTPATIENT)
Dept: INFUSION THERAPY | Facility: HOSPITAL | Age: 76
End: 2021-02-15
Attending: INTERNAL MEDICINE
Payer: MEDICARE

## 2021-02-15 VITALS
TEMPERATURE: 98 F | SYSTOLIC BLOOD PRESSURE: 127 MMHG | OXYGEN SATURATION: 96 % | RESPIRATION RATE: 17 BRPM | HEART RATE: 110 BPM | DIASTOLIC BLOOD PRESSURE: 58 MMHG

## 2021-02-15 DIAGNOSIS — J44.9 CHRONIC OBSTRUCTIVE PULMONARY DISEASE, UNSPECIFIED COPD TYPE: ICD-10-CM

## 2021-02-15 DIAGNOSIS — C34.91 MALIGNANT NEOPLASM OF RIGHT LUNG, UNSPECIFIED PART OF LUNG: Primary | ICD-10-CM

## 2021-02-15 DIAGNOSIS — M85.88 OSTEOPENIA OF LUMBAR SPINE: ICD-10-CM

## 2021-02-15 DIAGNOSIS — N18.30 CKD (CHRONIC KIDNEY DISEASE) STAGE 3, GFR 30-59 ML/MIN: ICD-10-CM

## 2021-02-15 LAB
ALBUMIN SERPL BCP-MCNC: 3.7 G/DL (ref 3.5–5.2)
ALP SERPL-CCNC: 104 U/L (ref 55–135)
ALT SERPL W/O P-5'-P-CCNC: 7 U/L (ref 10–44)
ANION GAP SERPL CALC-SCNC: 9 MMOL/L (ref 8–16)
AST SERPL-CCNC: 15 U/L (ref 10–40)
BILIRUB SERPL-MCNC: 0.3 MG/DL (ref 0.1–1)
BUN SERPL-MCNC: 17 MG/DL (ref 8–23)
CALCIUM SERPL-MCNC: 9.3 MG/DL (ref 8.7–10.5)
CHLORIDE SERPL-SCNC: 97 MMOL/L (ref 95–110)
CO2 SERPL-SCNC: 26 MMOL/L (ref 23–29)
CREAT SERPL-MCNC: 1.3 MG/DL (ref 0.5–1.4)
EST. GFR  (AFRICAN AMERICAN): 46 ML/MIN/1.73 M^2
EST. GFR  (NON AFRICAN AMERICAN): 40 ML/MIN/1.73 M^2
GLUCOSE SERPL-MCNC: 127 MG/DL (ref 70–110)
POTASSIUM SERPL-SCNC: 4.9 MMOL/L (ref 3.5–5.1)
PROT SERPL-MCNC: 7.2 G/DL (ref 6–8.4)
SODIUM SERPL-SCNC: 132 MMOL/L (ref 136–145)

## 2021-02-15 PROCEDURE — 63600175 PHARM REV CODE 636 W HCPCS: Mod: JG | Performed by: INTERNAL MEDICINE

## 2021-02-15 PROCEDURE — 96372 THER/PROPH/DIAG INJ SC/IM: CPT

## 2021-02-15 PROCEDURE — 80053 COMPREHEN METABOLIC PANEL: CPT

## 2021-02-15 RX ADMIN — DENOSUMAB 60 MG: 60 INJECTION SUBCUTANEOUS at 02:02

## 2021-03-12 ENCOUNTER — PATIENT OUTREACH (OUTPATIENT)
Dept: ADMINISTRATIVE | Facility: OTHER | Age: 76
End: 2021-03-12

## 2021-03-15 ENCOUNTER — OFFICE VISIT (OUTPATIENT)
Dept: OPHTHALMOLOGY | Facility: CLINIC | Age: 76
End: 2021-03-15
Payer: MEDICARE

## 2021-03-15 ENCOUNTER — TELEPHONE (OUTPATIENT)
Dept: OPHTHALMOLOGY | Facility: CLINIC | Age: 76
End: 2021-03-15

## 2021-03-15 DIAGNOSIS — H50.51 ESOPHORIA: ICD-10-CM

## 2021-03-15 DIAGNOSIS — H50.22 HYPERTROPIA OF LEFT EYE: ICD-10-CM

## 2021-03-15 DIAGNOSIS — H26.491 POSTERIOR CAPSULAR OPACIFICATION VISUALLY SIGNIFICANT, RIGHT EYE: Primary | ICD-10-CM

## 2021-03-15 PROCEDURE — 1126F AMNT PAIN NOTED NONE PRSNT: CPT | Mod: S$GLB,,, | Performed by: OPHTHALMOLOGY

## 2021-03-15 PROCEDURE — 1101F PT FALLS ASSESS-DOCD LE1/YR: CPT | Mod: CPTII,S$GLB,, | Performed by: OPHTHALMOLOGY

## 2021-03-15 PROCEDURE — 1126F PR PAIN SEVERITY QUANTIFIED, NO PAIN PRESENT: ICD-10-PCS | Mod: S$GLB,,, | Performed by: OPHTHALMOLOGY

## 2021-03-15 PROCEDURE — 99214 PR OFFICE/OUTPT VISIT, EST, LEVL IV, 30-39 MIN: ICD-10-PCS | Mod: 57,S$GLB,, | Performed by: OPHTHALMOLOGY

## 2021-03-15 PROCEDURE — 66821 AFTER CATARACT LASER SURGERY: CPT | Mod: RT,S$GLB,, | Performed by: OPHTHALMOLOGY

## 2021-03-15 PROCEDURE — 3288F FALL RISK ASSESSMENT DOCD: CPT | Mod: CPTII,S$GLB,, | Performed by: OPHTHALMOLOGY

## 2021-03-15 PROCEDURE — 1159F MED LIST DOCD IN RCRD: CPT | Mod: S$GLB,,, | Performed by: OPHTHALMOLOGY

## 2021-03-15 PROCEDURE — 1159F PR MEDICATION LIST DOCUMENTED IN MEDICAL RECORD: ICD-10-PCS | Mod: S$GLB,,, | Performed by: OPHTHALMOLOGY

## 2021-03-15 PROCEDURE — 3288F PR FALLS RISK ASSESSMENT DOCUMENTED: ICD-10-PCS | Mod: CPTII,S$GLB,, | Performed by: OPHTHALMOLOGY

## 2021-03-15 PROCEDURE — 1101F PR PT FALLS ASSESS DOC 0-1 FALLS W/OUT INJ PAST YR: ICD-10-PCS | Mod: CPTII,S$GLB,, | Performed by: OPHTHALMOLOGY

## 2021-03-15 PROCEDURE — 99214 OFFICE O/P EST MOD 30 MIN: CPT | Mod: 57,S$GLB,, | Performed by: OPHTHALMOLOGY

## 2021-03-15 PROCEDURE — 66821 YAG CAPSULOTOMY - OD - RIGHT EYE: ICD-10-PCS | Mod: RT,S$GLB,, | Performed by: OPHTHALMOLOGY

## 2021-03-15 RX ORDER — PREDNISOLONE ACETATE 10 MG/ML
1 SUSPENSION/ DROPS OPHTHALMIC 4 TIMES DAILY
Qty: 5 ML | Refills: 0 | Status: SHIPPED | OUTPATIENT
Start: 2021-03-15 | End: 2021-03-19

## 2021-03-17 NOTE — TELEPHONE ENCOUNTER
----- Message from Veronica Portillo sent at 7/31/2018  2:24 PM CDT -----  Contact: self  PT calling to get referred to a lung specialist  Please call at [phone] or [phone]  
[place] [facility] [place]  
done  
none

## 2021-03-23 ENCOUNTER — TELEPHONE (OUTPATIENT)
Dept: OPHTHALMOLOGY | Facility: CLINIC | Age: 76
End: 2021-03-23

## 2021-04-06 ENCOUNTER — OFFICE VISIT (OUTPATIENT)
Dept: OPTOMETRY | Facility: CLINIC | Age: 76
End: 2021-04-06
Payer: MEDICARE

## 2021-04-06 DIAGNOSIS — Z96.1 PSEUDOPHAKIA OF BOTH EYES: ICD-10-CM

## 2021-04-06 DIAGNOSIS — H04.123 DRY EYE SYNDROME, BILATERAL: Primary | ICD-10-CM

## 2021-04-06 DIAGNOSIS — H53.2 DIPLOPIA: ICD-10-CM

## 2021-04-06 DIAGNOSIS — H52.4 PRESBYOPIA: ICD-10-CM

## 2021-04-06 DIAGNOSIS — H50.51 ESOPHORIA: ICD-10-CM

## 2021-04-06 DIAGNOSIS — H35.3130 BILATERAL NONEXUDATIVE AGE-RELATED MACULAR DEGENERATION, UNSPECIFIED STAGE: ICD-10-CM

## 2021-04-06 DIAGNOSIS — H50.22 HYPERTROPIA OF LEFT EYE: ICD-10-CM

## 2021-04-06 PROCEDURE — 99999 PR PBB SHADOW E&M-EST. PATIENT-LVL I: ICD-10-PCS | Mod: PBBFAC,,, | Performed by: OPTOMETRIST

## 2021-04-06 PROCEDURE — 92012 INTRM OPH EXAM EST PATIENT: CPT | Mod: S$GLB,,, | Performed by: OPTOMETRIST

## 2021-04-06 PROCEDURE — 99999 PR PBB SHADOW E&M-EST. PATIENT-LVL I: CPT | Mod: PBBFAC,,, | Performed by: OPTOMETRIST

## 2021-04-06 PROCEDURE — 92012 PR EYE EXAM, EST PATIENT,INTERMED: ICD-10-PCS | Mod: S$GLB,,, | Performed by: OPTOMETRIST

## 2021-07-07 ENCOUNTER — PATIENT MESSAGE (OUTPATIENT)
Dept: ADMINISTRATIVE | Facility: HOSPITAL | Age: 76
End: 2021-07-07

## 2021-07-14 ENCOUNTER — PATIENT MESSAGE (OUTPATIENT)
Dept: ADMINISTRATIVE | Facility: HOSPITAL | Age: 76
End: 2021-07-14

## 2021-09-22 ENCOUNTER — TELEPHONE (OUTPATIENT)
Dept: HEMATOLOGY/ONCOLOGY | Facility: CLINIC | Age: 76
End: 2021-09-22

## 2021-10-04 ENCOUNTER — PATIENT MESSAGE (OUTPATIENT)
Dept: ADMINISTRATIVE | Facility: HOSPITAL | Age: 76
End: 2021-10-04

## 2021-12-15 ENCOUNTER — PATIENT MESSAGE (OUTPATIENT)
Dept: ADMINISTRATIVE | Facility: HOSPITAL | Age: 76
End: 2021-12-15
Payer: MEDICARE

## 2022-02-03 ENCOUNTER — PATIENT MESSAGE (OUTPATIENT)
Dept: FAMILY MEDICINE | Facility: CLINIC | Age: 77
End: 2022-02-03
Payer: MEDICARE

## 2022-11-21 PROBLEM — M81.0 AGE-RELATED OSTEOPOROSIS WITHOUT CURRENT PATHOLOGICAL FRACTURE: Status: ACTIVE | Noted: 2020-07-20

## 2022-11-21 PROBLEM — D64.9 ANEMIA: Status: ACTIVE | Noted: 2020-07-20

## 2022-11-21 PROBLEM — M19.041 OSTEOARTHRITIS OF BOTH HANDS: Status: ACTIVE | Noted: 2020-07-20

## 2022-11-21 PROBLEM — M19.042 OSTEOARTHRITIS OF BOTH HANDS: Status: ACTIVE | Noted: 2020-07-20

## 2022-11-21 PROBLEM — R06.02 SHORTNESS OF BREATH: Status: ACTIVE | Noted: 2020-07-10

## 2022-11-21 PROBLEM — K21.9 GERD WITHOUT ESOPHAGITIS: Status: ACTIVE | Noted: 2020-07-20

## 2022-11-21 RX ORDER — LOSARTAN POTASSIUM 25 MG/1
25 TABLET ORAL DAILY
COMMUNITY
Start: 2022-10-20

## 2022-11-21 RX ORDER — MIRTAZAPINE 15 MG/1
TABLET, FILM COATED ORAL
COMMUNITY
Start: 2022-11-05

## 2022-11-21 RX ORDER — HYDROCODONE BITARTRATE AND HOMATROPINE METHYLBROMIDE 5; 1.5 MG/5ML; MG/5ML
SOLUTION ORAL
COMMUNITY
Start: 2022-11-11

## 2022-11-21 RX ORDER — FERROUS SULFATE TAB 325 MG (65 MG ELEMENTAL FE) 325 (65 FE) MG
TAB ORAL
COMMUNITY
Start: 2022-09-20

## 2022-11-21 RX ORDER — BUPROPION HYDROCHLORIDE 150 MG/1
150 TABLET ORAL EVERY MORNING
COMMUNITY
Start: 2022-11-15

## 2022-11-21 RX ORDER — GABAPENTIN 300 MG/1
300 CAPSULE ORAL 3 TIMES DAILY
Qty: 90 CAPSULE | Refills: 6 | Status: SHIPPED | OUTPATIENT
Start: 2022-11-21

## 2022-11-21 RX ORDER — BUDESONIDE, GLYCOPYRROLATE, AND FORMOTEROL FUMARATE 160; 9; 4.8 UG/1; UG/1; UG/1
2 AEROSOL, METERED RESPIRATORY (INHALATION) 2 TIMES DAILY
COMMUNITY
Start: 2022-10-30

## (undated) DEVICE — WARMER DRAPE STERILE LF

## (undated) DEVICE — STAPLER GIA HANDLE STD

## (undated) DEVICE — SEE MEDLINE ITEM 146416

## (undated) DEVICE — SUT VICRYL 3-0 27 SH

## (undated) DEVICE — DRAPE ABDOMINAL TIBURON 14X11

## (undated) DEVICE — SPONGE TONSIL LARGE

## (undated) DEVICE — APPLICATOR CHLORAPREP ORN 26ML

## (undated) DEVICE — DRAIN CHEST DRY SUCTION

## (undated) DEVICE — ELECTRODE BLADE W/SLEEVE 2.75

## (undated) DEVICE — SEE MEDLINE ITEM 146313

## (undated) DEVICE — DRESSING ABSRBNT ISLAND 3.6X8

## (undated) DEVICE — TRAY MINOR GEN SURG

## (undated) DEVICE — SUT 2-0 VICRYL / CT-1

## (undated) DEVICE — DRAPE INCISE IOBAN 2 23X17IN

## (undated) DEVICE — DRESSING TELFA STRL 4X3 LF

## (undated) DEVICE — DRAIN CHAN RND HUBLS 8MM 24FR

## (undated) DEVICE — Device

## (undated) DEVICE — ELECTRODE BLADE INSULATED 1 IN

## (undated) DEVICE — SUT ETHILON 2-0 BLK PS-2

## (undated) DEVICE — SEE MEDLINE ITEM 154981

## (undated) DEVICE — BLADE ELECTRO EDGE INSULATED

## (undated) DEVICE — APPLIER CLIP ENDO MED/LG 10MM

## (undated) DEVICE — TAPE SILK 3IN

## (undated) DEVICE — CONTAINER SPECIMEN STRL 4OZ

## (undated) DEVICE — SEE MEDLINE ITEM 146420

## (undated) DEVICE — TAPE MEDIPORE 4IN X 2YDS

## (undated) DEVICE — DRESSING TRANS 4X4 TEGADERM

## (undated) DEVICE — DRESSING GAUZE 6PLY 4X4

## (undated) DEVICE — SEE MEDLINE ITEM 157116

## (undated) DEVICE — SEE MEDLINE ITEM 157117

## (undated) DEVICE — SEE MEDLINE ITEM 146417

## (undated) DEVICE — HEMOSTAT SURGICEL 4X8IN

## (undated) DEVICE — SUT CTD VICRYL 0 UND BR CT

## (undated) DEVICE — DRAPE THYROID WITH ARMBOARD

## (undated) DEVICE — ELECTRODE REM PLYHSV RETURN 9

## (undated) DEVICE — GOWN SMART IMP BREATHABLE XXLG

## (undated) DEVICE — KIT ANTIFOG

## (undated) DEVICE — RELOAD ENDO GIA TRISTAPLE 45MM

## (undated) DEVICE — TEGADERM IV

## (undated) DEVICE — GAUZE SPONGE PEANUT STRL

## (undated) DEVICE — SUT MONOCYRL 4-0 PS2 UND

## (undated) DEVICE — SEE MEDLINE ITEM 152622